# Patient Record
Sex: FEMALE | Race: WHITE | NOT HISPANIC OR LATINO | Employment: UNEMPLOYED | URBAN - NONMETROPOLITAN AREA
[De-identification: names, ages, dates, MRNs, and addresses within clinical notes are randomized per-mention and may not be internally consistent; named-entity substitution may affect disease eponyms.]

---

## 2017-05-30 DIAGNOSIS — D50.9 IRON DEFICIENCY ANEMIA, UNSPECIFIED IRON DEFICIENCY ANEMIA TYPE: Primary | ICD-10-CM

## 2017-05-31 ENCOUNTER — OFFICE VISIT (OUTPATIENT)
Dept: ONCOLOGY | Facility: CLINIC | Age: 52
End: 2017-05-31

## 2017-05-31 ENCOUNTER — LAB (OUTPATIENT)
Dept: ONCOLOGY | Facility: CLINIC | Age: 52
End: 2017-05-31

## 2017-05-31 VITALS
TEMPERATURE: 98.4 F | HEIGHT: 67 IN | DIASTOLIC BLOOD PRESSURE: 78 MMHG | RESPIRATION RATE: 16 BRPM | OXYGEN SATURATION: 97 % | WEIGHT: 248.1 LBS | HEART RATE: 80 BPM | BODY MASS INDEX: 38.94 KG/M2 | SYSTOLIC BLOOD PRESSURE: 128 MMHG

## 2017-05-31 DIAGNOSIS — N63.10 LUMP OF RIGHT BREAST: Primary | ICD-10-CM

## 2017-05-31 DIAGNOSIS — D50.9 IRON DEFICIENCY ANEMIA, UNSPECIFIED: Primary | ICD-10-CM

## 2017-05-31 DIAGNOSIS — D50.9 IRON DEFICIENCY ANEMIA, UNSPECIFIED IRON DEFICIENCY ANEMIA TYPE: ICD-10-CM

## 2017-05-31 PROBLEM — C50.412 MALIGNANT NEOPLASM OF UPPER-OUTER QUADRANT OF LEFT FEMALE BREAST: Status: ACTIVE | Noted: 2017-05-31

## 2017-05-31 LAB
ALBUMIN SERPL-MCNC: 4.4 G/DL (ref 3.5–5)
ALBUMIN/GLOB SERPL: 1.2 G/DL
ALP SERPL-CCNC: 131 U/L (ref 38–126)
ALT SERPL W P-5'-P-CCNC: 37 U/L (ref 9–52)
ANION GAP SERPL CALCULATED.3IONS-SCNC: 11 MMOL/L
AST SERPL-CCNC: 31 U/L (ref 5–40)
AUTO MIXED CELLS #: 0.6 10*3/UL (ref 0.1–1.5)
AUTO MIXED CELLS %: 6.5 % (ref 0.2–15.1)
BILIRUB SERPL-MCNC: 0.5 MG/DL (ref 0.2–1.3)
BUN BLD-MCNC: 11 MG/DL (ref 7–26)
BUN/CREAT SERPL: 13.8 (ref 7–25)
CALCIUM SPEC-SCNC: 9.4 MG/DL (ref 8.4–10.2)
CHLORIDE SERPL-SCNC: 107 MMOL/L (ref 98–107)
CO2 SERPL-SCNC: 27 MMOL/L (ref 22–30)
CREAT BLD-MCNC: 0.8 MG/DL (ref 0.7–1.4)
ERYTHROCYTE [DISTWIDTH] IN BLOOD BY AUTOMATED COUNT: 14.8 % (ref 11.5–14.5)
GFR SERPL CREATININE-BSD FRML MDRD: 76 ML/MIN/1.73
GLOBULIN UR ELPH-MCNC: 3.7 GM/DL
GLUCOSE BLD-MCNC: 133 MG/DL (ref 75–110)
HCT VFR BLD AUTO: 45.5 % (ref 37–47)
HGB BLD-MCNC: 14.4 G/DL (ref 12–16)
LYMPHOCYTES # BLD AUTO: 2.3 10*3/MM3 (ref 0.8–7)
LYMPHOCYTES NFR BLD AUTO: 25.6 % (ref 10–58.5)
MCH RBC QN AUTO: 28.6 PG (ref 27–31)
MCHC RBC AUTO-ENTMCNC: 31.6 G/DL (ref 33–37)
MCV RBC AUTO: 90.5 FL (ref 81–99)
NEUTROPHILS # BLD AUTO: 6 10*3/MM3 (ref 2–7.8)
NEUTROPHILS NFR BLD AUTO: 67.9 % (ref 37–92)
PLATELET # BLD AUTO: 370 10*3/MM3 (ref 130–400)
PMV BLD AUTO: 9 FL (ref 6–12)
POTASSIUM BLD-SCNC: 3.8 MMOL/L (ref 3.6–5)
PROT SERPL-MCNC: 8.1 G/DL (ref 6.3–8.2)
RBC # BLD AUTO: 5.03 10*6/MM3 (ref 4.2–5.4)
SODIUM BLD-SCNC: 145 MMOL/L (ref 137–145)
WBC NRBC COR # BLD: 8.8 10*3/MM3 (ref 4.8–10.8)

## 2017-05-31 PROCEDURE — 80053 COMPREHEN METABOLIC PANEL: CPT | Performed by: INTERNAL MEDICINE

## 2017-05-31 PROCEDURE — 85025 COMPLETE CBC W/AUTO DIFF WBC: CPT | Performed by: INTERNAL MEDICINE

## 2017-05-31 PROCEDURE — 36415 COLL VENOUS BLD VENIPUNCTURE: CPT | Performed by: INTERNAL MEDICINE

## 2017-05-31 PROCEDURE — 99213 OFFICE O/P EST LOW 20 MIN: CPT | Performed by: INTERNAL MEDICINE

## 2017-05-31 RX ORDER — CLOBETASOL PROPIONATE 0.5 MG/G
CREAM TOPICAL
COMMUNITY
Start: 2017-04-03 | End: 2019-07-24

## 2017-05-31 RX ORDER — ANASTROZOLE 1 MG/1
1 TABLET ORAL DAILY
Qty: 30 TABLET | Refills: 5 | Status: SHIPPED | OUTPATIENT
Start: 2017-05-31 | End: 2017-12-04 | Stop reason: SDUPTHER

## 2017-05-31 RX ORDER — PREDNISONE 10 MG/1
TABLET ORAL
COMMUNITY
Start: 2017-04-03 | End: 2019-07-24

## 2017-06-01 ENCOUNTER — HOSPITAL ENCOUNTER (OUTPATIENT)
Dept: GENERAL RADIOLOGY | Age: 52
Discharge: HOME | End: 2017-06-01
Payer: COMMERCIAL

## 2017-06-01 DIAGNOSIS — N63.0 BREAST NODULE: ICD-10-CM

## 2017-06-01 DIAGNOSIS — N63.10 LUMP OF RIGHT BREAST: ICD-10-CM

## 2017-06-01 PROCEDURE — 71020 XR CHEST STANDARD TWO VW: CPT

## 2017-06-20 ENCOUNTER — HOSPITAL ENCOUNTER (OUTPATIENT)
Dept: WOMENS IMAGING | Age: 52
Discharge: HOME | End: 2017-06-20
Payer: COMMERCIAL

## 2017-06-20 ENCOUNTER — OFFICE VISIT (OUTPATIENT)
Dept: SURGERY | Age: 52
End: 2017-06-20
Payer: COMMERCIAL

## 2017-06-20 VITALS
SYSTOLIC BLOOD PRESSURE: 130 MMHG | DIASTOLIC BLOOD PRESSURE: 80 MMHG | BODY MASS INDEX: 38.61 KG/M2 | HEIGHT: 67 IN | WEIGHT: 246 LBS | HEART RATE: 88 BPM

## 2017-06-20 DIAGNOSIS — Z90.13 STATUS POST MASTECTOMY, BILATERAL: Primary | ICD-10-CM

## 2017-06-20 DIAGNOSIS — Z85.3 HX OF BREAST CANCER: ICD-10-CM

## 2017-06-20 DIAGNOSIS — R22.2 CHEST SWELLING: ICD-10-CM

## 2017-06-20 DIAGNOSIS — N63.0 BREAST LUMP: ICD-10-CM

## 2017-06-20 PROCEDURE — 76642 ULTRASOUND BREAST LIMITED: CPT

## 2017-06-20 PROCEDURE — 99213 OFFICE O/P EST LOW 20 MIN: CPT | Performed by: SURGERY

## 2017-10-05 DIAGNOSIS — E55.9 VITAMIN D DEFICIENCY: ICD-10-CM

## 2017-10-05 DIAGNOSIS — Z00.00 HEALTHCARE MAINTENANCE: Primary | ICD-10-CM

## 2017-10-17 ENCOUNTER — TELEPHONE (OUTPATIENT)
Dept: SURGERY | Age: 52
End: 2017-10-17

## 2017-12-04 RX ORDER — ANASTROZOLE 1 MG/1
1 TABLET ORAL DAILY
Qty: 90 TABLET | Refills: 3 | Status: SHIPPED | OUTPATIENT
Start: 2017-12-04 | End: 2018-03-28

## 2018-03-27 DIAGNOSIS — C50.412 MALIGNANT NEOPLASM OF UPPER-OUTER QUADRANT OF LEFT FEMALE BREAST, UNSPECIFIED ESTROGEN RECEPTOR STATUS (HCC): Primary | ICD-10-CM

## 2018-03-28 ENCOUNTER — APPOINTMENT (OUTPATIENT)
Dept: LAB | Facility: HOSPITAL | Age: 53
End: 2018-03-28

## 2018-03-28 ENCOUNTER — OFFICE VISIT (OUTPATIENT)
Dept: ONCOLOGY | Facility: CLINIC | Age: 53
End: 2018-03-28

## 2018-03-28 VITALS
HEIGHT: 67 IN | WEIGHT: 255.5 LBS | SYSTOLIC BLOOD PRESSURE: 130 MMHG | TEMPERATURE: 98.2 F | OXYGEN SATURATION: 95 % | DIASTOLIC BLOOD PRESSURE: 74 MMHG | RESPIRATION RATE: 18 BRPM | BODY MASS INDEX: 40.1 KG/M2 | HEART RATE: 84 BPM

## 2018-03-28 DIAGNOSIS — D05.12 DUCTAL CARCINOMA IN SITU OF LEFT BREAST: Primary | ICD-10-CM

## 2018-03-28 DIAGNOSIS — C50.412 MALIGNANT NEOPLASM OF UPPER-OUTER QUADRANT OF LEFT FEMALE BREAST, UNSPECIFIED ESTROGEN RECEPTOR STATUS (HCC): Primary | ICD-10-CM

## 2018-03-28 LAB
ALBUMIN SERPL-MCNC: 4.4 G/DL (ref 3.5–5)
ALBUMIN/GLOB SERPL: 1.3 G/DL (ref 1.1–2.5)
ALP SERPL-CCNC: 168 U/L (ref 24–120)
ALT SERPL W P-5'-P-CCNC: 57 U/L (ref 0–54)
ANION GAP SERPL CALCULATED.3IONS-SCNC: 13 MMOL/L (ref 4–13)
AST SERPL-CCNC: 50 U/L (ref 7–45)
BASOPHILS # BLD AUTO: 0.05 10*3/MM3 (ref 0–0.2)
BASOPHILS NFR BLD AUTO: 0.6 % (ref 0–2)
BILIRUB SERPL-MCNC: 0.5 MG/DL (ref 0.1–1)
BUN BLD-MCNC: 12 MG/DL (ref 5–21)
BUN/CREAT SERPL: 15 (ref 7–25)
CALCIUM SPEC-SCNC: 10.4 MG/DL (ref 8.4–10.4)
CHLORIDE SERPL-SCNC: 102 MMOL/L (ref 98–110)
CO2 SERPL-SCNC: 27 MMOL/L (ref 24–31)
CREAT BLD-MCNC: 0.8 MG/DL (ref 0.5–1.4)
DEPRECATED RDW RBC AUTO: 41.3 FL (ref 40–54)
EOSINOPHIL # BLD AUTO: 0.15 10*3/MM3 (ref 0–0.7)
EOSINOPHIL NFR BLD AUTO: 1.7 % (ref 0–4)
ERYTHROCYTE [DISTWIDTH] IN BLOOD BY AUTOMATED COUNT: 13.6 % (ref 12–15)
GFR SERPL CREATININE-BSD FRML MDRD: 75 ML/MIN/1.73
GLOBULIN UR ELPH-MCNC: 3.5 GM/DL
GLUCOSE BLD-MCNC: 90 MG/DL (ref 70–100)
HCT VFR BLD AUTO: 44.5 % (ref 37–47)
HGB BLD-MCNC: 14.8 G/DL (ref 12–16)
HOLD SPECIMEN: NORMAL
HOLD SPECIMEN: NORMAL
IMM GRANULOCYTES # BLD: 0.02 10*3/MM3 (ref 0–0.03)
IMM GRANULOCYTES NFR BLD: 0.2 % (ref 0–5)
LYMPHOCYTES # BLD AUTO: 2.27 10*3/MM3 (ref 0.72–4.86)
LYMPHOCYTES NFR BLD AUTO: 25 % (ref 15–45)
MCH RBC QN AUTO: 27.7 PG (ref 28–32)
MCHC RBC AUTO-ENTMCNC: 33.3 G/DL (ref 33–36)
MCV RBC AUTO: 83.3 FL (ref 82–98)
MONOCYTES # BLD AUTO: 0.56 10*3/MM3 (ref 0.19–1.3)
MONOCYTES NFR BLD AUTO: 6.2 % (ref 4–12)
NEUTROPHILS # BLD AUTO: 6.02 10*3/MM3 (ref 1.87–8.4)
NEUTROPHILS NFR BLD AUTO: 66.3 % (ref 39–78)
NRBC BLD MANUAL-RTO: 0 /100 WBC (ref 0–0)
PLATELET # BLD AUTO: 359 10*3/MM3 (ref 130–400)
PMV BLD AUTO: 9.6 FL (ref 6–12)
POTASSIUM BLD-SCNC: 3.8 MMOL/L (ref 3.5–5.3)
PROT SERPL-MCNC: 7.9 G/DL (ref 6.3–8.7)
RBC # BLD AUTO: 5.34 10*6/MM3 (ref 4.2–5.4)
SODIUM BLD-SCNC: 142 MMOL/L (ref 135–145)
WBC NRBC COR # BLD: 9.07 10*3/MM3 (ref 4.8–10.8)

## 2018-03-28 PROCEDURE — 85025 COMPLETE CBC W/AUTO DIFF WBC: CPT | Performed by: INTERNAL MEDICINE

## 2018-03-28 PROCEDURE — 80053 COMPREHEN METABOLIC PANEL: CPT | Performed by: INTERNAL MEDICINE

## 2018-03-28 PROCEDURE — 99213 OFFICE O/P EST LOW 20 MIN: CPT | Performed by: INTERNAL MEDICINE

## 2018-03-28 PROCEDURE — 36415 COLL VENOUS BLD VENIPUNCTURE: CPT

## 2018-03-28 NOTE — PROGRESS NOTES
PROBLEM LIST:  DCIS of the left breast, status post bilateral mastectomies.  She had a left mastectomy at diagnosis in  and a prophylactic right mastectomy in .  She was started on adjuvant Arimidex after her original left mastectomy and has continued on it.  49 year old female who had onset of menses at age 15. Her last normal menstrual period was at age 39. She used oral contraceptives for  approximately 20 years. She used hormonal manipulation for approximately 1 year. Her first pregnancy was at age 26 and she had 2  children . Patient had a maternal and pancreatic cancer. A maternal grandmother had uterine cancer and colon cancer. A first cousin   of leukemia. There's no history of prostate cancer, breast cancer or ovarian cancer.  Patient had a routine mammogram on 10/15/2014 finding calcifications in the upperouter  quadrant left breast. A unilateral mammogram of  the left breast reveals suspicious microcalcifications category 4. The patient was referred to Dr. Mariana Chaney and a stereotactic biopsy was  performed of the left breast on 11/3/2014. Biopsy was consistent with ductal carcinoma in situ, high grade with central necrosis and  calcification. The area measures 1.2 cm in greatest linear dimension. Breast December profile estrogen receptor: 99% and progesterone  receptor: 94%? HER2/  davi 1+? FISH: Equivocal.  Patient return to the operating room on 2014 and a left breast simple mastectomy was performed showing ductal carcinoma in situ,  solid, comedo and cribriform patterns nuclear grade 23.  No invasive carcinoma. Margins of mastectomy are free of involvement of tumor.          HISTORY OF PRESENT ILLNESS:   Chief complaint: Here about of DCIS of the breast  Ms. Tony continues on Arimidex.  She's had some diffuse aching and some hot flashes.  These occur regularly and do wake her at night sometimes.  She's tried vitamin D you to see if it would help with the aching and  "didn't have any changes in her side effects.  She doesn't have any long bone pain or other symptoms typical of metastatic breast cancer.  She notes and irregularity along her chest wall that was investigated previously including ultrasound but hasn't noticed any increase or change.    Past Medical History, Past Surgical History, Social History, Family History have been reviewed and are without significant changes except as mentioned.    Review of Systems   A comprehensive 14 point review of systems was performed and was negative except as mentioned.    Medications:  The current medication list was reviewed in the EMR    ALLERGIES:  Allergies not on file           /74   Pulse 84   Temp 98.2 °F (36.8 °C) (Tympanic)   Resp 18   Ht 170.2 cm (67\")   Wt 116 kg (255 lb 8 oz)   SpO2 95%   BMI 40.02 kg/m²      Performance Status: ECOG 0    General: well appearing, in no acute distress  HEENT: sclera anicteric, oropharynx clear  Lymphatics: no cervical, supraclavicular, or axillary adenopathy  Cardiovascular: regular rate and rhythm, no murmurs  Lungs: clear to auscultation bilaterally  Abdomen: soft, nontender, nondistended.  No palpable organomegaly  Breasts: The chest wall has no nodules or other evidence of recurrence.  There is no abnormal erythema or any exudate to suggest infection.  Extremeties: no lower extremity edema  Skin: no rashes, lesions, bruising, or petechiae    RECENT LABS:   WBC   Date Value Ref Range Status   03/28/2018 9.07 4.80 - 10.80 10*3/mm3 Final     Hemoglobin   Date Value Ref Range Status   03/28/2018 14.8 12.0 - 16.0 g/dL Final     Hematocrit   Date Value Ref Range Status   03/28/2018 44.5 37.0 - 47.0 % Final     MCV   Date Value Ref Range Status   03/28/2018 83.3 82.0 - 98.0 fL Final     RDW   Date Value Ref Range Status   03/28/2018 13.6 12.0 - 15.0 % Final     MPV   Date Value Ref Range Status   03/28/2018 9.6 6.0 - 12.0 fL Final     Platelets   Date Value Ref Range Status "   03/28/2018 359 130 - 400 10*3/mm3 Final     Immature Grans %   Date Value Ref Range Status   03/28/2018 0.2 0.0 - 5.0 % Final     Neutrophils, Absolute   Date Value Ref Range Status   03/28/2018 6.02 1.87 - 8.40 10*3/mm3 Final     Lymphocytes, Absolute   Date Value Ref Range Status   03/28/2018 2.27 0.72 - 4.86 10*3/mm3 Final     Monocytes, Absolute   Date Value Ref Range Status   03/28/2018 0.56 0.19 - 1.30 10*3/mm3 Final     Eosinophils, Absolute   Date Value Ref Range Status   03/28/2018 0.15 0.00 - 0.70 10*3/mm3 Final     Basophils, Absolute   Date Value Ref Range Status   03/28/2018 0.05 0.00 - 0.20 10*3/mm3 Final     Immature Grans, Absolute   Date Value Ref Range Status   03/28/2018 0.02 0.00 - 0.03 10*3/mm3 Final     nRBC   Date Value Ref Range Status   03/28/2018 0.0 0.0 - 0.0 /100 WBC Final       Glucose   Date Value Ref Range Status   05/31/2017 133 (H) 75 - 110 mg/dL Final     Sodium   Date Value Ref Range Status   05/31/2017 145 137 - 145 mmol/L Final     Potassium   Date Value Ref Range Status   05/31/2017 3.8 3.6 - 5.0 mmol/L Final     CO2   Date Value Ref Range Status   05/31/2017 27.0 22.0 - 30.0 mmol/L Final     Chloride   Date Value Ref Range Status   05/31/2017 107 98 - 107 mmol/L Final     Anion Gap   Date Value Ref Range Status   05/31/2017 11.0 mmol/L Final     Creatinine   Date Value Ref Range Status   05/31/2017 0.80 0.70 - 1.40 mg/dL Final     BUN   Date Value Ref Range Status   05/31/2017 11 7 - 26 mg/dL Final     BUN/Creatinine Ratio   Date Value Ref Range Status   05/31/2017 13.8 7.0 - 25.0 Final     Calcium   Date Value Ref Range Status   05/31/2017 9.4 8.4 - 10.2 mg/dL Final     eGFR Non  Amer   Date Value Ref Range Status   05/31/2017 76 >60 mL/min/1.73 Final     Alkaline Phosphatase   Date Value Ref Range Status   05/31/2017 131 (H) 38 - 126 U/L Final     Total Protein   Date Value Ref Range Status   05/31/2017 8.1 6.3 - 8.2 g/dL Final     ALT (SGPT)   Date Value Ref Range  Status   05/31/2017 37 9 - 52 U/L Final     AST (SGOT)   Date Value Ref Range Status   05/31/2017 31 5 - 40 U/L Final     Total Bilirubin   Date Value Ref Range Status   05/31/2017 0.5 0.2 - 1.3 mg/dL Final     Albumin   Date Value Ref Range Status   05/31/2017 4.40 3.50 - 5.00 g/dL Final     Globulin   Date Value Ref Range Status   05/31/2017 3.7 gm/dL Final     A/G Ratio   Date Value Ref Range Status   05/31/2017 1.2 g/dL Final       No results found for: LDH, URICACID    No results found for: MSPIKE, KAPPALAMB, IGLFLC, FREEKAPPAL              Impression: 1.  Ductal carcinoma in situ of the left breast.  She's now had bilateral mastectomies.  She's been on adjuvant tamoxifen since her original diagnosis.    Plan: 1.  We had a detailed discussion about potential benefits and risks of adjuvant therapy in her situation.  I am first we discussed the lack of any survival benefit for adjuvant endocrine therapy of DCIS.  We then discussed the original benefit for decrease in risk of cancer in the contralateral breast, before she underwent prophylactic right mastectomy.  Given the minimal benefit, if any from additional adjuvant endocrine therapy and the side effects involved, she's comfortable with my recommendation to stop the Arimidex.  I've asked her to continue regular examinations of her incisions and return in 6 months.       Keenan Coombs MD  [facility]    03/28/18           CC:

## 2018-10-02 ENCOUNTER — APPOINTMENT (OUTPATIENT)
Dept: LAB | Facility: HOSPITAL | Age: 53
End: 2018-10-02

## 2019-07-03 ENCOUNTER — TELEPHONE (OUTPATIENT)
Dept: INTERNAL MEDICINE | Age: 54
End: 2019-07-03

## 2019-07-24 ENCOUNTER — OFFICE VISIT (OUTPATIENT)
Dept: GASTROENTEROLOGY | Facility: CLINIC | Age: 54
End: 2019-07-24

## 2019-07-24 VITALS
SYSTOLIC BLOOD PRESSURE: 124 MMHG | WEIGHT: 225 LBS | HEIGHT: 66 IN | OXYGEN SATURATION: 100 % | DIASTOLIC BLOOD PRESSURE: 76 MMHG | HEART RATE: 76 BPM | BODY MASS INDEX: 36.16 KG/M2 | TEMPERATURE: 97.7 F

## 2019-07-24 DIAGNOSIS — Z80.0 FAMILY HISTORY OF COLON CANCER: ICD-10-CM

## 2019-07-24 DIAGNOSIS — Z86.010 HX OF COLONIC POLYPS: Primary | ICD-10-CM

## 2019-07-24 DIAGNOSIS — Z83.71 FAMILY HISTORY OF POLYPS IN THE COLON: ICD-10-CM

## 2019-07-24 PROBLEM — Z86.0100 HX OF COLONIC POLYPS: Status: ACTIVE | Noted: 2019-07-24

## 2019-07-24 PROBLEM — Z83.719 FAMILY HISTORY OF POLYPS IN THE COLON: Status: ACTIVE | Noted: 2019-07-24

## 2019-07-24 PROCEDURE — S0260 H&P FOR SURGERY: HCPCS | Performed by: NURSE PRACTITIONER

## 2019-07-24 RX ORDER — PHENOL 1.4 %
600 AEROSOL, SPRAY (ML) MUCOUS MEMBRANE DAILY
COMMUNITY
End: 2022-09-28 | Stop reason: ALTCHOICE

## 2019-07-24 RX ORDER — SODIUM, POTASSIUM,MAG SULFATES 17.5-3.13G
1 SOLUTION, RECONSTITUTED, ORAL ORAL EVERY 12 HOURS
Qty: 2 BOTTLE | Refills: 0 | COMMUNITY
Start: 2019-07-24 | End: 2019-08-02 | Stop reason: HOSPADM

## 2019-07-24 RX ORDER — MELATONIN
1000 DAILY
COMMUNITY
End: 2020-09-29 | Stop reason: DRUGHIGH

## 2019-07-24 NOTE — PROGRESS NOTES
Chief Complaint   Patient presents with   • Colon Cancer Screening     Pt presents today for colon recall-last colon was 2015; Personal history of colon polyps; Family history of colon cancer and colon polyps     Subjective   HPI  Madhav Tony is a 53 y.o. female who presents as a referral for preventative maintenance. She has no complaints of nausea or vomiting. No change in bowels. No wt loss. No BRBPR. No melena. There is a family hx for colon cancer maternal grandmother >60. No abdominal pain. There was no Cologuard screening this year.  The patient's last colonoscopy was performed on 2015 with findings of adenomatous polyps.      Past Medical History:   Diagnosis Date   • Breast cancer (CMS/HCC)    • Family history of colon cancer    • Family history of colonic polyps    • Heart burn    • History of colon polyps    • History of uterine leiomyoma      Past Surgical History:   Procedure Laterality Date   • BREAST BIOPSY Right    •  SECTION     • COLONOSCOPY  2015    4mm tubular adenomatous polyp in transverse colon; Repeat 3 years   • HYSTERECTOMY     • MASTECTOMY Left        Current Outpatient Medications:   •  calcium carbonate (OS-JAVED) 600 MG tablet, Take 600 mg by mouth Daily., Disp: , Rfl:   •  cholecalciferol (VITAMIN D3) 1000 units tablet, Take 1,000 Units by mouth Daily., Disp: , Rfl:   •  sodium-potassium-magnesium sulfates (SUPREP BOWEL PREP KIT) 17.5-3.13-1.6 GM/177ML solution oral solution, Take 1 bottle by mouth Every 12 (Twelve) Hours. Split dose prep as directed by office instructions provided.  2 bottles = one kit., Disp: 2 bottle, Rfl: 0  No Known Allergies  Social History     Socioeconomic History   • Marital status:      Spouse name: Not on file   • Number of children: Not on file   • Years of education: Not on file   • Highest education level: Not on file   Tobacco Use   • Smoking status: Never Smoker   • Smokeless tobacco: Never Used   Substance and Sexual  "Activity   • Alcohol use: No     Family History   Problem Relation Age of Onset   • Colon polyps Mother    • Uterine cancer Maternal Grandfather    • Colon cancer Maternal Grandmother         In her 60's or 70's       REVIEW OF SYSTEMS  General: well appearing, no fever chills or sweats, no unexplained wt loss  HEENT: no acute visual or hearing disturbances  Cardiovascular: No chest pain or palpitations  Pulmonary: No shortness of breath, coughing, wheezing or hemoptysis  : No burning, urgency, hematuria, or dysuria  Musculoskeletal: No joint pain or stiffness  Peripheral: no edema  Skin: No lesions or rashes  Neuro: No dizziness, headaches, stroke, syncope  Endocrine: No hot or cold intolerances  Hematological: No blood dyscrasias    Objective   Vitals:    07/24/19 0808   BP: 124/76   BP Location: Left arm   Patient Position: Sitting   Cuff Size: Adult   Pulse: 76   Temp: 97.7 °F (36.5 °C)   TempSrc: Tympanic   SpO2: 100%   Weight: 102 kg (225 lb)   Height: 167.6 cm (66\")     Body mass index is 36.32 kg/m².    PHYSICAL EXAM  General: age appropriate well nourished well appearing, no acute distress  Head: normocephalic and atraumatic  Global assessment-supple  Neck-No JVD noted, no lymphadenopathy  Pulmonary-clear to auscultation bilaterally, normal respiratory effort  Cardiovascular-normal rate and rhythm, normal heart sounds, S1 and S2 noted  Abdomen-soft, non tender, non distended, normal bowel sounds all 4 quadrants, no hepatosplenomegaly noted  Extremities-No clubbing cyanosis or edema  Neuro-Non focal, converses appropriately, awake, alert, oriented    Imaging Results (most recent)     None        Assessment/Plan   Madhav was seen today for colon cancer screening.    Diagnoses and all orders for this visit:    Hx of colonic polyps  -     Case Request; Standing  -     Case Request    Family history of colon cancer  Comments:  maternal grandmother >60  Orders:  -     Case Request; Standing  -     Case " Request    Family history of polyps in the colon  Comments:  mother  Orders:  -     Case Request; Standing  -     Case Request    Other orders  -     Follow Anesthesia Guidelines / Standing Orders; Future  -     Obtain Informed Consent; Future  -     Implement Anesthesia Orders Day of Procedure; Standing  -     Obtain Informed Consent; Standing  -     Verify bowel prep was successful; Standing  -     sodium-potassium-magnesium sulfates (SUPREP BOWEL PREP KIT) 17.5-3.13-1.6 GM/177ML solution oral solution; Take 1 bottle by mouth Every 12 (Twelve) Hours. Split dose prep as directed by office instructions provided.  2 bottles = one kit.      COLONOSCOPY WITH ANESTHESIA (N/A)       Body mass index is 36.32 kg/m². Patient's Body mass index is 36.32 kg/m². BMI is above normal parameters. Recommendations include: nutrition counseling.      All risks, benefits, alternatives, and indications of colonoscopy procedure have been discussed with the patient. Risks to include perforation of the colon requiring possible surgery or colostomy, risk of bleeding from biopsies or removal of colon tissue, possibility of missing a colon polyp or cancer, or adverse drug reaction.  Benefits to include the diagnosis and management of disease of the colon and rectum. Alternatives to include barium enema, radiographic evaluation, lab testing or no intervention. Pt verbalizes understanding and agrees.

## 2019-08-02 ENCOUNTER — ANESTHESIA (OUTPATIENT)
Dept: GASTROENTEROLOGY | Facility: HOSPITAL | Age: 54
End: 2019-08-02

## 2019-08-02 ENCOUNTER — HOSPITAL ENCOUNTER (OUTPATIENT)
Facility: HOSPITAL | Age: 54
Setting detail: HOSPITAL OUTPATIENT SURGERY
Discharge: HOME | End: 2019-08-02
Attending: INTERNAL MEDICINE | Admitting: INTERNAL MEDICINE

## 2019-08-02 ENCOUNTER — ANESTHESIA EVENT (OUTPATIENT)
Dept: GASTROENTEROLOGY | Facility: HOSPITAL | Age: 54
End: 2019-08-02

## 2019-08-02 VITALS
RESPIRATION RATE: 14 BRPM | HEIGHT: 66 IN | WEIGHT: 224 LBS | HEART RATE: 54 BPM | SYSTOLIC BLOOD PRESSURE: 107 MMHG | TEMPERATURE: 97.6 F | OXYGEN SATURATION: 100 % | BODY MASS INDEX: 36 KG/M2 | DIASTOLIC BLOOD PRESSURE: 58 MMHG

## 2019-08-02 DIAGNOSIS — Z86.010 HX OF COLONIC POLYPS: ICD-10-CM

## 2019-08-02 DIAGNOSIS — Z80.0 FAMILY HISTORY OF COLON CANCER: ICD-10-CM

## 2019-08-02 DIAGNOSIS — Z83.71 FAMILY HISTORY OF POLYPS IN THE COLON: ICD-10-CM

## 2019-08-02 PROCEDURE — 45385 COLONOSCOPY W/LESION REMOVAL: CPT | Performed by: INTERNAL MEDICINE

## 2019-08-02 PROCEDURE — 25010000002 PROPOFOL 10 MG/ML EMULSION: Performed by: NURSE ANESTHETIST, CERTIFIED REGISTERED

## 2019-08-02 PROCEDURE — 88305 TISSUE EXAM BY PATHOLOGIST: CPT | Performed by: INTERNAL MEDICINE

## 2019-08-02 RX ORDER — SODIUM CHLORIDE 0.9 % (FLUSH) 0.9 %
3 SYRINGE (ML) INJECTION EVERY 12 HOURS SCHEDULED
Status: CANCELLED | OUTPATIENT
Start: 2019-08-02

## 2019-08-02 RX ORDER — PROPOFOL 10 MG/ML
VIAL (ML) INTRAVENOUS AS NEEDED
Status: DISCONTINUED | OUTPATIENT
Start: 2019-08-02 | End: 2019-08-02 | Stop reason: SURG

## 2019-08-02 RX ORDER — SODIUM CHLORIDE 9 MG/ML
100 INJECTION, SOLUTION INTRAVENOUS CONTINUOUS
Status: CANCELLED | OUTPATIENT
Start: 2019-08-02

## 2019-08-02 RX ORDER — SODIUM CHLORIDE 0.9 % (FLUSH) 0.9 %
3 SYRINGE (ML) INJECTION AS NEEDED
Status: DISCONTINUED | OUTPATIENT
Start: 2019-08-02 | End: 2019-08-02 | Stop reason: HOSPADM

## 2019-08-02 RX ORDER — SODIUM CHLORIDE 0.9 % (FLUSH) 0.9 %
3-10 SYRINGE (ML) INJECTION AS NEEDED
Status: CANCELLED | OUTPATIENT
Start: 2019-08-02

## 2019-08-02 RX ORDER — SODIUM CHLORIDE 9 MG/ML
500 INJECTION, SOLUTION INTRAVENOUS CONTINUOUS PRN
Status: DISCONTINUED | OUTPATIENT
Start: 2019-08-02 | End: 2019-08-02 | Stop reason: HOSPADM

## 2019-08-02 RX ADMIN — PROPOFOL 230 MG: 10 INJECTION, EMULSION INTRAVENOUS at 09:17

## 2019-08-02 RX ADMIN — SODIUM CHLORIDE 500 ML: 9 INJECTION, SOLUTION INTRAVENOUS at 07:51

## 2019-08-02 NOTE — INTERVAL H&P NOTE
H&P updated. The patient was examined and the following changes are noted:        She had a tubulovillous adenoma removed in November 2015.  Here for surveillance.

## 2019-08-02 NOTE — ANESTHESIA PREPROCEDURE EVALUATION
Anesthesia Evaluation     no history of anesthetic complications:  NPO Solid Status: > 8 hours  NPO Liquid Status: > 4 hours           Airway   Mallampati: II  TM distance: >3 FB  Neck ROM: full  Dental - normal exam     Pulmonary - normal exam    breath sounds clear to auscultation  (-) asthma, recent URI, sleep apnea, not a smoker  Cardiovascular - normal exam  Exercise tolerance: good (4-7 METS)    Rhythm: regular  Rate: normal    (-) pacemaker, hypertension, past MI, angina, cardiac stents, CABG      Neuro/Psych  (-) seizures, TIA, CVA  GI/Hepatic/Renal/Endo    (+) obesity,     (-) GERD, liver disease, no renal disease, diabetes, hypothyroidism, hyperthyroidism    Musculoskeletal     Abdominal    Substance History      OB/GYN          Other                        Anesthesia Plan    ASA 2     MAC     intravenous induction   Anesthetic plan, all risks, benefits, and alternatives have been provided, discussed and informed consent has been obtained with: patient.

## 2019-08-09 LAB
CYTO UR: NORMAL
LAB AP CASE REPORT: NORMAL
PATH REPORT.FINAL DX SPEC: NORMAL
PATH REPORT.GROSS SPEC: NORMAL

## 2019-08-29 ENCOUNTER — HOSPITAL ENCOUNTER (OUTPATIENT)
Dept: ULTRASOUND IMAGING | Age: 54
Discharge: HOME | End: 2019-08-29
Payer: MEDICAID

## 2019-08-29 ENCOUNTER — TELEPHONE (OUTPATIENT)
Dept: INTERNAL MEDICINE | Age: 54
End: 2019-08-29

## 2019-08-29 ENCOUNTER — OFFICE VISIT (OUTPATIENT)
Dept: INTERNAL MEDICINE | Age: 54
End: 2019-08-29
Payer: MEDICAID

## 2019-08-29 VITALS
HEIGHT: 67 IN | BODY MASS INDEX: 34.06 KG/M2 | OXYGEN SATURATION: 98 % | DIASTOLIC BLOOD PRESSURE: 90 MMHG | HEART RATE: 69 BPM | SYSTOLIC BLOOD PRESSURE: 160 MMHG | TEMPERATURE: 97.3 F | WEIGHT: 217 LBS

## 2019-08-29 DIAGNOSIS — R10.11 RUQ PAIN: Primary | ICD-10-CM

## 2019-08-29 DIAGNOSIS — K80.20 MULTIPLE CALCULI OF GALLBLADDER: ICD-10-CM

## 2019-08-29 DIAGNOSIS — R10.11 RUQ PAIN: ICD-10-CM

## 2019-08-29 LAB
ALBUMIN SERPL-MCNC: 4.4 G/DL (ref 3.5–5.2)
ALP BLD-CCNC: 246 U/L (ref 35–104)
ALT SERPL-CCNC: 330 U/L (ref 5–33)
AMYLASE: 54 U/L (ref 28–100)
ANION GAP SERPL CALCULATED.3IONS-SCNC: 15 MMOL/L (ref 7–19)
AST SERPL-CCNC: 277 U/L (ref 5–32)
BASOPHILS ABSOLUTE: 0 K/UL (ref 0–0.2)
BASOPHILS RELATIVE PERCENT: 0.4 % (ref 0–1)
BILIRUB SERPL-MCNC: 0.6 MG/DL (ref 0.2–1.2)
BUN BLDV-MCNC: 6 MG/DL (ref 6–20)
CALCIUM SERPL-MCNC: 9.6 MG/DL (ref 8.6–10)
CHLORIDE BLD-SCNC: 105 MMOL/L (ref 98–111)
CHOLESTEROL, TOTAL: 169 MG/DL (ref 160–199)
CO2: 25 MMOL/L (ref 22–29)
CREAT SERPL-MCNC: 0.8 MG/DL (ref 0.5–0.9)
EOSINOPHILS ABSOLUTE: 0.1 K/UL (ref 0–0.6)
EOSINOPHILS RELATIVE PERCENT: 1.4 % (ref 0–5)
GFR NON-AFRICAN AMERICAN: >60
GLUCOSE BLD-MCNC: 82 MG/DL (ref 74–109)
HCT VFR BLD CALC: 45 % (ref 37–47)
HDLC SERPL-MCNC: 50 MG/DL (ref 65–121)
HEMOGLOBIN: 14.4 G/DL (ref 12–16)
IMMATURE GRANULOCYTES #: 0 K/UL
LDL CHOLESTEROL CALCULATED: 102 MG/DL
LIPASE: 37 U/L (ref 13–60)
LYMPHOCYTES ABSOLUTE: 1.6 K/UL (ref 1.1–4.5)
LYMPHOCYTES RELATIVE PERCENT: 17.4 % (ref 20–40)
MCH RBC QN AUTO: 28.2 PG (ref 27–31)
MCHC RBC AUTO-ENTMCNC: 32 G/DL (ref 33–37)
MCV RBC AUTO: 88.2 FL (ref 81–99)
MONOCYTES ABSOLUTE: 0.5 K/UL (ref 0–0.9)
MONOCYTES RELATIVE PERCENT: 5.2 % (ref 0–10)
NEUTROPHILS ABSOLUTE: 6.9 K/UL (ref 1.5–7.5)
NEUTROPHILS RELATIVE PERCENT: 75.3 % (ref 50–65)
PDW BLD-RTO: 14.3 % (ref 11.5–14.5)
PLATELET # BLD: 295 K/UL (ref 130–400)
PMV BLD AUTO: 10.5 FL (ref 9.4–12.3)
POTASSIUM SERPL-SCNC: 4.1 MMOL/L (ref 3.5–5)
RBC # BLD: 5.1 M/UL (ref 4.2–5.4)
SODIUM BLD-SCNC: 145 MMOL/L (ref 136–145)
TOTAL PROTEIN: 7.4 G/DL (ref 6.6–8.7)
TRIGL SERPL-MCNC: 87 MG/DL (ref 0–149)
TSH SERPL DL<=0.05 MIU/L-ACNC: 0.83 UIU/ML (ref 0.27–4.2)
WBC # BLD: 9.2 K/UL (ref 4.8–10.8)

## 2019-08-29 PROCEDURE — 4004F PT TOBACCO SCREEN RCVD TLK: CPT | Performed by: INTERNAL MEDICINE

## 2019-08-29 PROCEDURE — G8427 DOCREV CUR MEDS BY ELIG CLIN: HCPCS | Performed by: INTERNAL MEDICINE

## 2019-08-29 PROCEDURE — 99203 OFFICE O/P NEW LOW 30 MIN: CPT | Performed by: INTERNAL MEDICINE

## 2019-08-29 PROCEDURE — 76705 ECHO EXAM OF ABDOMEN: CPT

## 2019-08-29 PROCEDURE — 3017F COLORECTAL CA SCREEN DOC REV: CPT | Performed by: INTERNAL MEDICINE

## 2019-08-29 PROCEDURE — G8417 CALC BMI ABV UP PARAM F/U: HCPCS | Performed by: INTERNAL MEDICINE

## 2019-08-29 RX ORDER — PHENOL 1.4 %
600 AEROSOL, SPRAY (ML) MUCOUS MEMBRANE DAILY
COMMUNITY

## 2019-08-29 RX ORDER — MELATONIN
1000 DAILY
COMMUNITY

## 2019-08-29 NOTE — PATIENT INSTRUCTIONS
Patient Education        Learning About Acute Cholecystitis  What is cholecystitis? Cholecystitis (say \"koh-lih-sis-TY-tus\") is inflammation of the gallbladder. The gallbladder stores bile. Bile helps the body digest food. Normally, the bile flows from the gallbladder to the small intestine. A gallstone stuck in the cystic duct is most often the cause of sudden (acute) cholecystitis. The cystic duct is the tube that carries the bile out of the gallbladder. The gallstone blocks the bile from leaving the gallbladder. This results in an irritated and swollen gallbladder. The disease can also be caused by infection or trauma, such as an injury from a car accident. Cholecystitis has to be treated right away. You will probably have to go to the hospital. Surgery is the usual treatment. What are the symptoms? Symptoms include:  · Steady and severe pain in the upper right part of belly. This is the most common symptom. The pain can sometimes move to your back or right shoulder blade. It may last for more than 6 hours. · Nausea or vomiting. · A fever. How is it treated? The main way to treat this disease is surgery to remove the gallbladder. This surgery can often be done through small cuts (incisions) in the belly. This is called a laparoscopic cholecystectomy. In some cases, you may need a more extensive surgery. You may need surgery as soon as possible. The doctor may try to reduce swelling and irritation in the gallbladder before removing it. You may be given fluids and antibiotics through an IV. You may also be given pain medicine. Follow-up care is a key part of your treatment and safety. Be sure to make and go to all appointments, and call your doctor if you are having problems. It's also a good idea to know your test results and keep a list of the medicines you take. Where can you learn more? Go to [email or web address][email or web address] org and sign in to your [employer] account.  Enter T940 in the Search

## 2019-08-30 DIAGNOSIS — K80.20 CALCULUS OF GALLBLADDER WITHOUT CHOLECYSTITIS WITHOUT OBSTRUCTION: Primary | ICD-10-CM

## 2019-08-30 RX ORDER — HYDROCODONE BITARTRATE AND ACETAMINOPHEN 5; 325 MG/1; MG/1
1 TABLET ORAL EVERY 6 HOURS PRN
Qty: 10 TABLET | Refills: 0 | Status: SHIPPED | OUTPATIENT
Start: 2019-08-30 | End: 2019-09-02

## 2019-08-31 ASSESSMENT — ENCOUNTER SYMPTOMS
TROUBLE SWALLOWING: 0
CHEST TIGHTNESS: 0
SINUS PRESSURE: 0
COLOR CHANGE: 0
SORE THROAT: 0
CHOKING: 0
SHORTNESS OF BREATH: 0
COUGH: 0
EYE PAIN: 0
WHEEZING: 0
EYE DISCHARGE: 0
BACK PAIN: 0
VOMITING: 0
NAUSEA: 0
EYE REDNESS: 0
VOICE CHANGE: 0
DIARRHEA: 0
ABDOMINAL DISTENTION: 0
CONSTIPATION: 0
BLOOD IN STOOL: 0
RHINORRHEA: 0
ANAL BLEEDING: 0
FACIAL SWELLING: 0
PHOTOPHOBIA: 0
RECTAL PAIN: 0
ABDOMINAL PAIN: 1
EYE ITCHING: 0

## 2019-08-31 NOTE — PROGRESS NOTES
Chief Complaint   Patient presents with    GI Problem     Pt has pain that starts in the center of her epigastric region and radiates around her right side to her back. She thinks this might be her gallbladder. She states that attacks get worse each time. Mostly at night while she is trying to sleep. HPI: Lana Jimenez is a 48 y.o. female is here for reestablishment of care. She has not been seen in this office in over 3 years. She thinks that she may be having some gallbladder issues. She states that she did have a colonoscopy in 2019, over the past few weeks, she is been having some right upper quadrant pain, that radiates to the back. At times, it will wake her up 4-5 times during the night. She is tried Prilosec, but has not had any relief from this. She denies any complaints of nausea vomiting or diarrhea. She states that she has not really had a high fat diet. She denies any complaints of fevers or chills.     Past Medical History:   Diagnosis Date    History of breast cancer     Left    History of uterine leiomyoma       Past Surgical History:   Procedure Laterality Date     SECTION      HYSTERECTOMY, TOTAL ABDOMINAL  2005    MASTECTOMY Left 2014      Social History     Socioeconomic History    Marital status:      Spouse name: Not on file    Number of children: Not on file    Years of education: Not on file    Highest education level: Not on file   Occupational History    Not on file   Social Needs    Financial resource strain: Not on file    Food insecurity:     Worry: Not on file     Inability: Not on file    Transportation needs:     Medical: Not on file     Non-medical: Not on file   Tobacco Use    Smoking status: Never Smoker   Substance and Sexual Activity    Alcohol use: No     Alcohol/week: 0.0 standard drinks    Drug use: No    Sexual activity: Not on file   Lifestyle    Physical activity:     Days per week: Not on file     Minutes per session: Not on file    Stress: Not on file   Relationships    Social connections:     Talks on phone: Not on file     Gets together: Not on file     Attends Caodaism service: Not on file     Active member of club or organization: Not on file     Attends meetings of clubs or organizations: Not on file     Relationship status: Not on file    Intimate partner violence:     Fear of current or ex partner: Not on file     Emotionally abused: Not on file     Physically abused: Not on file     Forced sexual activity: Not on file   Other Topics Concern    Not on file   Social History Narrative    Not on file      Family History   Problem Relation Age of Onset    Cancer Maternal Grandmother     Ovarian Cancer Maternal Grandmother     Breast Cancer Maternal Aunt         Current Outpatient Medications   Medication Sig Dispense Refill    calcium carbonate 600 MG TABS tablet Take 600 mg by mouth daily      Cholecalciferol (VITAMIN D3) 1000 units TABS Take 1,000 Units by mouth daily      HYDROcodone-acetaminophen (NORCO) 5-325 MG per tablet Take 1 tablet by mouth every 6 hours as needed for Pain for up to 3 days. 10 tablet 0     No current facility-administered medications for this visit. Patient Active Problem List   Diagnosis    Status post mastectomy        Review of Systems   Constitutional: Negative for activity change, appetite change, chills, diaphoresis, fatigue, fever and unexpected weight change. HENT: Negative for congestion, ear pain, facial swelling, hearing loss, mouth sores, nosebleeds, postnasal drip, rhinorrhea, sinus pressure, sneezing, sore throat, tinnitus, trouble swallowing and voice change. Eyes: Negative for photophobia, pain, discharge, redness, itching and visual disturbance. Respiratory: Negative for cough, choking, chest tightness, shortness of breath and wheezing. Cardiovascular: Negative for chest pain, palpitations and leg swelling.    Gastrointestinal: Positive for friction rub. No murmur heard. Pulmonary/Chest: Effort normal and breath sounds normal. No respiratory distress. She has no wheezes. She has no rales. She exhibits no tenderness. Abdominal: Soft. Bowel sounds are normal. She exhibits no distension and no mass. There is tenderness. There is no rebound and no guarding. Shee is tender in the right upper quadrant   Musculoskeletal: Normal range of motion. She exhibits no edema, tenderness or deformity. Lymphadenopathy:     She has no cervical adenopathy. Neurological: She is alert and oriented to person, place, and time. She has normal reflexes. She displays normal reflexes. No cranial nerve deficit. She exhibits normal muscle tone. Coordination normal.   Skin: Skin is warm and dry. No rash noted. She is not diaphoretic. No erythema. No pallor. Psychiatric: She has a normal mood and affect. Her behavior is normal.   Nursing note and vitals reviewed. 1. RUQ pain        ASSESSMENT/PLAN:    51-year-old woman here for establishment of care and to be evaluated for gallbladder disease. 1.  Right upper quadrant abdominal pain: Likely related to gallbladder disease. We will check a right upper quadrant ultrasound. If this is negative, will get a HIDA scan. We will check a CBC CMP, amylase and lipase. We will also check basic labs including TSH and lipid panel. She actually has a physical exam scheduled in the near future. Flakita Steiner was seen today for gi problem. Diagnoses and all orders for this visit:    RUQ pain  -     Comprehensive Metabolic Panel; Future  -     Lipase; Future  -     Amylase; Future  -     CBC Auto Differential; Future  -     TSH without Reflex; Future  -     Lipid Panel; Future  -     US Gallbladder Ruq; Future  -     NM Hepatobiliary; Future          Return as scheduled.      Orders Placed This Encounter   Procedures    US Gallbladder Ruq     Standing Status:   Future     Number of Occurrences:   1     Standing Expiration Date:

## 2019-09-03 ENCOUNTER — HOSPITAL ENCOUNTER (OUTPATIENT)
Dept: PREADMISSION TESTING | Age: 54
Setting detail: OUTPATIENT SURGERY
Discharge: HOME | End: 2019-09-07

## 2019-09-03 ENCOUNTER — HOSPITAL ENCOUNTER (OUTPATIENT)
Dept: NON INVASIVE DIAGNOSTICS | Age: 54
Discharge: HOME | End: 2019-09-03
Payer: MEDICAID

## 2019-09-03 ENCOUNTER — OFFICE VISIT (OUTPATIENT)
Dept: SURGERY | Age: 54
End: 2019-09-03
Payer: MEDICAID

## 2019-09-03 ENCOUNTER — PREP FOR PROCEDURE (OUTPATIENT)
Dept: SURGERY | Age: 54
End: 2019-09-03

## 2019-09-03 VITALS
DIASTOLIC BLOOD PRESSURE: 76 MMHG | BODY MASS INDEX: 34.84 KG/M2 | TEMPERATURE: 98 F | HEIGHT: 66 IN | WEIGHT: 216.8 LBS | OXYGEN SATURATION: 99 % | HEART RATE: 67 BPM | SYSTOLIC BLOOD PRESSURE: 120 MMHG

## 2019-09-03 VITALS — HEIGHT: 66 IN | BODY MASS INDEX: 34.84 KG/M2 | WEIGHT: 216.8 LBS

## 2019-09-03 DIAGNOSIS — K80.10 CHRONIC CHOLECYSTITIS WITH CALCULUS: Primary | ICD-10-CM

## 2019-09-03 PROCEDURE — 1036F TOBACCO NON-USER: CPT | Performed by: PHYSICIAN ASSISTANT

## 2019-09-03 PROCEDURE — 99203 OFFICE O/P NEW LOW 30 MIN: CPT | Performed by: PHYSICIAN ASSISTANT

## 2019-09-03 PROCEDURE — 93005 ELECTROCARDIOGRAM TRACING: CPT

## 2019-09-03 PROCEDURE — 3017F COLORECTAL CA SCREEN DOC REV: CPT | Performed by: PHYSICIAN ASSISTANT

## 2019-09-03 PROCEDURE — G8427 DOCREV CUR MEDS BY ELIG CLIN: HCPCS | Performed by: PHYSICIAN ASSISTANT

## 2019-09-03 PROCEDURE — G8417 CALC BMI ABV UP PARAM F/U: HCPCS | Performed by: PHYSICIAN ASSISTANT

## 2019-09-03 RX ORDER — SODIUM CHLORIDE 0.9 % (FLUSH) 0.9 %
10 SYRINGE (ML) INJECTION PRN
Status: CANCELLED | OUTPATIENT
Start: 2019-09-03

## 2019-09-03 RX ORDER — SODIUM CHLORIDE, SODIUM LACTATE, POTASSIUM CHLORIDE, CALCIUM CHLORIDE 600; 310; 30; 20 MG/100ML; MG/100ML; MG/100ML; MG/100ML
INJECTION, SOLUTION INTRAVENOUS CONTINUOUS
Status: CANCELLED | OUTPATIENT
Start: 2019-09-03

## 2019-09-03 RX ORDER — SODIUM CHLORIDE 0.9 % (FLUSH) 0.9 %
10 SYRINGE (ML) INJECTION EVERY 12 HOURS SCHEDULED
Status: CANCELLED | OUTPATIENT
Start: 2019-09-03

## 2019-09-03 RX ORDER — TRIAMCINOLONE ACETONIDE 5 MG/G
CREAM TOPICAL
Refills: 3 | COMMUNITY
Start: 2019-08-28

## 2019-09-03 ASSESSMENT — ENCOUNTER SYMPTOMS
CONSTIPATION: 0
NAUSEA: 0
DIARRHEA: 0
VOMITING: 0
BLOOD IN STOOL: 0
ABDOMINAL PAIN: 1

## 2019-09-03 NOTE — H&P
Alcohol use: No     Alcohol/week: 0.0 standard drinks         Past Medical History:   Diagnosis Date    Cancer ([facility] [address]     breast    History of breast cancer     Left    History of uterine leiomyoma      Family History   Problem Relation Age of Onset    Cancer Maternal Grandmother     Ovarian Cancer Maternal Grandmother     Breast Cancer Maternal Aunt     Other Father         MVA     Past Surgical History:   Procedure Laterality Date     SECTION      HYSTERECTOMY, TOTAL ABDOMINAL  2005    MASTECTOMY Left 2014     Social History     Tobacco Use    Smoking status: Never Smoker    Smokeless tobacco: Never Used   Substance Use Topics    Alcohol use: No     Alcohol/week: 0.0 standard drinks    Drug use: No     Review of Systems   Constitutional: Positive for appetite change. Negative for chills, fatigue and fever. Gastrointestinal: Positive for abdominal pain. Negative for blood in stool, constipation, diarrhea, nausea and vomiting. Objective:   Physical Exam   Constitutional: She is oriented to person, place, and time. She appears well-developed and well-nourished. /76 (Site: Right Upper Arm, Position: Sitting, Cuff Size: Large Adult)   Pulse 67   Temp 98 °F (36.7 °C) (Temporal)   Ht 5' 6\" (1.676 m)   Wt 216 lb 12.8 oz (98.3 kg)   SpO2 99%   BMI 34.99 kg/m²      HENT:   Head: Normocephalic. Mouth/Throat: Oropharynx is clear and moist.   Eyes: Pupils are equal, round, and reactive to light. Conjunctivae and EOM are normal.   Neck: No tracheal deviation present. No thyromegaly present. Cardiovascular: Normal rate, regular rhythm, normal heart sounds and intact distal pulses. Pulmonary/Chest: Effort normal and breath sounds normal.   Abdominal: Soft. Bowel sounds are normal. There is no hepatosplenomegaly, splenomegaly or hepatomegaly. There is tenderness in the right upper quadrant. There is no CVA tenderness, no tenderness at McBurney's point and negative Wilkerson's sign. Musculoskeletal: Normal range of motion. She exhibits no edema. Neurological: She is alert and oriented to person, place, and time. Skin: Skin is warm and dry. Psychiatric: She has a normal mood and affect. Her behavior is normal. Judgment and thought content normal.     Vitals:    09/03/19 1053   BP: 120/76   Pulse: 67   Temp: 98 °F (36.7 °C)   SpO2: 99%       Assessment:      Chronic Cholecystitis with Cholelithiasis      Plan:      Plan: The risks, benefits, and options were discussed with the pt. She is willing to accept all risks and proceed with a Lap. Cholecystectomy. The surgical risks included but not limited to bleeding, infection, perforation, risk of common duct injury, risk of needing an open surgery, etc. The anesthetic risks included heart attacks, strokes, pneumonia, phlebitis, etc.  She is willing to accept all risks and proceed with surgery. No guarantees have been given.       Electronically signed by Dereje Fowler PA-C on 9/3/19 at 5:32 PM

## 2019-09-03 NOTE — H&P (VIEW-ONLY)
Alcohol use: No     Alcohol/week: 0.0 standard drinks         Past Medical History:   Diagnosis Date    Cancer ([facility] [address]     breast    History of breast cancer     Left    History of uterine leiomyoma      Family History   Problem Relation Age of Onset    Cancer Maternal Grandmother     Ovarian Cancer Maternal Grandmother     Breast Cancer Maternal Aunt     Other Father         MVA     Past Surgical History:   Procedure Laterality Date     SECTION      HYSTERECTOMY, TOTAL ABDOMINAL  2005    MASTECTOMY Left 2014     Social History     Tobacco Use    Smoking status: Never Smoker    Smokeless tobacco: Never Used   Substance Use Topics    Alcohol use: No     Alcohol/week: 0.0 standard drinks    Drug use: No     Review of Systems   Constitutional: Positive for appetite change. Negative for chills, fatigue and fever. Gastrointestinal: Positive for abdominal pain. Negative for blood in stool, constipation, diarrhea, nausea and vomiting. Objective:   Physical Exam   Constitutional: She is oriented to person, place, and time. She appears well-developed and well-nourished. /76 (Site: Right Upper Arm, Position: Sitting, Cuff Size: Large Adult)   Pulse 67   Temp 98 °F (36.7 °C) (Temporal)   Ht 5' 6\" (1.676 m)   Wt 216 lb 12.8 oz (98.3 kg)   SpO2 99%   BMI 34.99 kg/m²      HENT:   Head: Normocephalic. Mouth/Throat: Oropharynx is clear and moist.   Eyes: Pupils are equal, round, and reactive to light. Conjunctivae and EOM are normal.   Neck: No tracheal deviation present. No thyromegaly present. Cardiovascular: Normal rate, regular rhythm, normal heart sounds and intact distal pulses. Pulmonary/Chest: Effort normal and breath sounds normal.   Abdominal: Soft. Bowel sounds are normal. There is no hepatosplenomegaly, splenomegaly or hepatomegaly. There is tenderness in the right upper quadrant. There is no CVA tenderness, no tenderness at McBurney's point and negative Wilkerson's sign. Musculoskeletal: Normal range of motion. She exhibits no edema. Neurological: She is alert and oriented to person, place, and time. Skin: Skin is warm and dry. Psychiatric: She has a normal mood and affect. Her behavior is normal. Judgment and thought content normal.     Vitals:    09/03/19 1053   BP: 120/76   Pulse: 67   Temp: 98 °F (36.7 °C)   SpO2: 99%       Assessment:      Chronic Cholecystitis with Cholelithiasis      Plan:      Plan: The risks, benefits, and options were discussed with the pt. She is willing to accept all risks and proceed with a Lap. Cholecystectomy. The surgical risks included but not limited to bleeding, infection, perforation, risk of common duct injury, risk of needing an open surgery, etc. The anesthetic risks included heart attacks, strokes, pneumonia, phlebitis, etc.  She is willing to accept all risks and proceed with surgery. No guarantees have been given.       Electronically signed by Simone Bazzi PA-C on 9/3/19 at 5:32 PM

## 2019-09-04 ENCOUNTER — ANESTHESIA EVENT (OUTPATIENT)
Dept: OPERATING ROOM | Age: 54
End: 2019-09-04

## 2019-09-05 LAB
EKG P AXIS: 26 DEGREES
EKG P-R INTERVAL: 180 MS
EKG Q-T INTERVAL: 422 MS
EKG QRS DURATION: 88 MS
EKG QTC CALCULATION (BAZETT): 421 MS
EKG T AXIS: 39 DEGREES

## 2019-09-06 ENCOUNTER — HOSPITAL ENCOUNTER (OUTPATIENT)
Age: 54
Setting detail: SPECIMEN
Discharge: HOME | End: 2019-09-06
Payer: MEDICAID

## 2019-09-06 ENCOUNTER — ANESTHESIA (OUTPATIENT)
Dept: OPERATING ROOM | Age: 54
End: 2019-09-06

## 2019-09-06 ENCOUNTER — HOSPITAL ENCOUNTER (OUTPATIENT)
Age: 54
Setting detail: OUTPATIENT SURGERY
Discharge: HOME | End: 2019-09-06
Attending: SURGERY | Admitting: SURGERY
Payer: MEDICAID

## 2019-09-06 VITALS
OXYGEN SATURATION: 92 % | SYSTOLIC BLOOD PRESSURE: 139 MMHG | HEIGHT: 66 IN | DIASTOLIC BLOOD PRESSURE: 75 MMHG | WEIGHT: 216 LBS | HEART RATE: 60 BPM | RESPIRATION RATE: 18 BRPM | BODY MASS INDEX: 34.72 KG/M2 | TEMPERATURE: 97.7 F

## 2019-09-06 VITALS
OXYGEN SATURATION: 92 % | SYSTOLIC BLOOD PRESSURE: 226 MMHG | RESPIRATION RATE: 1 BRPM | DIASTOLIC BLOOD PRESSURE: 179 MMHG

## 2019-09-06 DIAGNOSIS — G89.18 POST-OP PAIN: Primary | ICD-10-CM

## 2019-09-06 PROCEDURE — 47562 LAPAROSCOPIC CHOLECYSTECTOMY: CPT | Performed by: SURGERY

## 2019-09-06 PROCEDURE — G8916 PT W IV AB GIVEN ON TIME: HCPCS

## 2019-09-06 PROCEDURE — 88304 TISSUE EXAM BY PATHOLOGIST: CPT

## 2019-09-06 PROCEDURE — G8907 PT DOC NO EVENTS ON DISCHARG: HCPCS

## 2019-09-06 PROCEDURE — 47562 LAPAROSCOPIC CHOLECYSTECTOMY: CPT

## 2019-09-06 RX ORDER — LIDOCAINE HYDROCHLORIDE 10 MG/ML
1 INJECTION, SOLUTION EPIDURAL; INFILTRATION; INTRACAUDAL; PERINEURAL
Status: DISCONTINUED | OUTPATIENT
Start: 2019-09-06 | End: 2019-09-06 | Stop reason: HOSPADM

## 2019-09-06 RX ORDER — ROCURONIUM BROMIDE 10 MG/ML
INJECTION, SOLUTION INTRAVENOUS PRN
Status: DISCONTINUED | OUTPATIENT
Start: 2019-09-06 | End: 2019-09-06 | Stop reason: SDUPTHER

## 2019-09-06 RX ORDER — MORPHINE SULFATE 10 MG/ML
4 INJECTION, SOLUTION INTRAMUSCULAR; INTRAVENOUS
Status: DISCONTINUED | OUTPATIENT
Start: 2019-09-06 | End: 2019-09-06 | Stop reason: HOSPADM

## 2019-09-06 RX ORDER — SODIUM CHLORIDE 0.9 % (FLUSH) 0.9 %
10 SYRINGE (ML) INJECTION EVERY 12 HOURS SCHEDULED
Status: DISCONTINUED | OUTPATIENT
Start: 2019-09-06 | End: 2019-09-06 | Stop reason: HOSPADM

## 2019-09-06 RX ORDER — BUPIVACAINE HYDROCHLORIDE AND EPINEPHRINE 2.5; 5 MG/ML; UG/ML
INJECTION, SOLUTION INFILTRATION; PERINEURAL PRN
Status: DISCONTINUED | OUTPATIENT
Start: 2019-09-06 | End: 2019-09-06 | Stop reason: ALTCHOICE

## 2019-09-06 RX ORDER — ONDANSETRON 2 MG/ML
INJECTION INTRAMUSCULAR; INTRAVENOUS PRN
Status: DISCONTINUED | OUTPATIENT
Start: 2019-09-06 | End: 2019-09-06 | Stop reason: SDUPTHER

## 2019-09-06 RX ORDER — MIDAZOLAM HYDROCHLORIDE 1 MG/ML
INJECTION INTRAMUSCULAR; INTRAVENOUS PRN
Status: DISCONTINUED | OUTPATIENT
Start: 2019-09-06 | End: 2019-09-06 | Stop reason: SDUPTHER

## 2019-09-06 RX ORDER — MORPHINE SULFATE 10 MG/ML
2 INJECTION, SOLUTION INTRAMUSCULAR; INTRAVENOUS
Status: DISCONTINUED | OUTPATIENT
Start: 2019-09-06 | End: 2019-09-06 | Stop reason: HOSPADM

## 2019-09-06 RX ORDER — SODIUM CHLORIDE, SODIUM LACTATE, POTASSIUM CHLORIDE, CALCIUM CHLORIDE 600; 310; 30; 20 MG/100ML; MG/100ML; MG/100ML; MG/100ML
INJECTION, SOLUTION INTRAVENOUS CONTINUOUS
Status: DISCONTINUED | OUTPATIENT
Start: 2019-09-06 | End: 2019-09-06 | Stop reason: HOSPADM

## 2019-09-06 RX ORDER — HYDROMORPHONE HCL 110MG/55ML
0.5 PATIENT CONTROLLED ANALGESIA SYRINGE INTRAVENOUS EVERY 10 MIN PRN
Status: DISCONTINUED | OUTPATIENT
Start: 2019-09-06 | End: 2019-09-06 | Stop reason: HOSPADM

## 2019-09-06 RX ORDER — OXYCODONE HYDROCHLORIDE 5 MG/1
5 TABLET ORAL EVERY 4 HOURS PRN
Status: DISCONTINUED | OUTPATIENT
Start: 2019-09-06 | End: 2019-09-06 | Stop reason: HOSPADM

## 2019-09-06 RX ORDER — LIDOCAINE HYDROCHLORIDE 10 MG/ML
INJECTION, SOLUTION INFILTRATION; PERINEURAL PRN
Status: DISCONTINUED | OUTPATIENT
Start: 2019-09-06 | End: 2019-09-06 | Stop reason: SDUPTHER

## 2019-09-06 RX ORDER — KETOROLAC TROMETHAMINE 30 MG/ML
30 INJECTION, SOLUTION INTRAMUSCULAR; INTRAVENOUS ONCE
Status: COMPLETED | OUTPATIENT
Start: 2019-09-06 | End: 2019-09-06

## 2019-09-06 RX ORDER — ONDANSETRON 2 MG/ML
4 INJECTION INTRAMUSCULAR; INTRAVENOUS EVERY 6 HOURS PRN
Status: DISCONTINUED | OUTPATIENT
Start: 2019-09-06 | End: 2019-09-06 | Stop reason: HOSPADM

## 2019-09-06 RX ORDER — FENTANYL CITRATE 50 UG/ML
INJECTION, SOLUTION INTRAMUSCULAR; INTRAVENOUS PRN
Status: DISCONTINUED | OUTPATIENT
Start: 2019-09-06 | End: 2019-09-06 | Stop reason: SDUPTHER

## 2019-09-06 RX ORDER — FENTANYL CITRATE 50 UG/ML
50 INJECTION, SOLUTION INTRAMUSCULAR; INTRAVENOUS EVERY 10 MIN PRN
Status: DISCONTINUED | OUTPATIENT
Start: 2019-09-06 | End: 2019-09-06 | Stop reason: HOSPADM

## 2019-09-06 RX ORDER — SODIUM CHLORIDE 0.9 % (FLUSH) 0.9 %
10 SYRINGE (ML) INJECTION PRN
Status: DISCONTINUED | OUTPATIENT
Start: 2019-09-06 | End: 2019-09-06 | Stop reason: HOSPADM

## 2019-09-06 RX ORDER — ONDANSETRON 4 MG/1
4 TABLET, FILM COATED ORAL ONCE
Status: COMPLETED | OUTPATIENT
Start: 2019-09-06 | End: 2019-09-06

## 2019-09-06 RX ORDER — HYDROCODONE BITARTRATE AND ACETAMINOPHEN 5; 325 MG/1; MG/1
1 TABLET ORAL EVERY 6 HOURS PRN
Status: DISCONTINUED | OUTPATIENT
Start: 2019-09-06 | End: 2019-09-06 | Stop reason: HOSPADM

## 2019-09-06 RX ORDER — PROPOFOL 10 MG/ML
INJECTION, EMULSION INTRAVENOUS PRN
Status: DISCONTINUED | OUTPATIENT
Start: 2019-09-06 | End: 2019-09-06 | Stop reason: SDUPTHER

## 2019-09-06 RX ORDER — DEXAMETHASONE SODIUM PHOSPHATE 4 MG/ML
INJECTION, SOLUTION INTRA-ARTICULAR; INTRALESIONAL; INTRAMUSCULAR; INTRAVENOUS; SOFT TISSUE PRN
Status: DISCONTINUED | OUTPATIENT
Start: 2019-09-06 | End: 2019-09-06 | Stop reason: SDUPTHER

## 2019-09-06 RX ORDER — HYDROCODONE BITARTRATE AND ACETAMINOPHEN 5; 325 MG/1; MG/1
2 TABLET ORAL EVERY 6 HOURS PRN
Qty: 30 TABLET | Refills: 0 | Status: SHIPPED | OUTPATIENT
Start: 2019-09-06 | End: 2019-09-10 | Stop reason: ALTCHOICE

## 2019-09-06 RX ORDER — OXYCODONE HYDROCHLORIDE 5 MG/1
10 TABLET ORAL EVERY 4 HOURS PRN
Status: DISCONTINUED | OUTPATIENT
Start: 2019-09-06 | End: 2019-09-06 | Stop reason: HOSPADM

## 2019-09-06 RX ORDER — SCOLOPAMINE TRANSDERMAL SYSTEM 1 MG/1
1 PATCH, EXTENDED RELEASE TRANSDERMAL
Status: DISCONTINUED | OUTPATIENT
Start: 2019-09-06 | End: 2019-09-06 | Stop reason: HOSPADM

## 2019-09-06 RX ORDER — SUCCINYLCHOLINE CHLORIDE 20 MG/ML
INJECTION INTRAMUSCULAR; INTRAVENOUS PRN
Status: DISCONTINUED | OUTPATIENT
Start: 2019-09-06 | End: 2019-09-06 | Stop reason: SDUPTHER

## 2019-09-06 RX ADMIN — MIDAZOLAM HYDROCHLORIDE 1 MG: 1 INJECTION INTRAMUSCULAR; INTRAVENOUS at 09:51

## 2019-09-06 RX ADMIN — ONDANSETRON 4 MG: 4 TABLET, FILM COATED ORAL at 12:14

## 2019-09-06 RX ADMIN — HYDROCODONE BITARTRATE AND ACETAMINOPHEN 1 TABLET: 5; 325 TABLET ORAL at 12:13

## 2019-09-06 RX ADMIN — FENTANYL CITRATE 50 MCG: 50 INJECTION, SOLUTION INTRAMUSCULAR; INTRAVENOUS at 10:46

## 2019-09-06 RX ADMIN — FENTANYL CITRATE 50 MCG: 50 INJECTION, SOLUTION INTRAMUSCULAR; INTRAVENOUS at 09:51

## 2019-09-06 RX ADMIN — Medication 0.5 MG: at 11:41

## 2019-09-06 RX ADMIN — LIDOCAINE HYDROCHLORIDE 20 MG: 10 INJECTION, SOLUTION INFILTRATION; PERINEURAL at 09:52

## 2019-09-06 RX ADMIN — SODIUM CHLORIDE, SODIUM LACTATE, POTASSIUM CHLORIDE, CALCIUM CHLORIDE: 600; 310; 30; 20 INJECTION, SOLUTION INTRAVENOUS at 08:49

## 2019-09-06 RX ADMIN — PROPOFOL 70 MG: 10 INJECTION, EMULSION INTRAVENOUS at 09:52

## 2019-09-06 RX ADMIN — DEXAMETHASONE SODIUM PHOSPHATE 4 MG: 4 INJECTION, SOLUTION INTRA-ARTICULAR; INTRALESIONAL; INTRAMUSCULAR; INTRAVENOUS; SOFT TISSUE at 10:26

## 2019-09-06 RX ADMIN — ROCURONIUM BROMIDE 5 MG: 10 INJECTION, SOLUTION INTRAVENOUS at 09:52

## 2019-09-06 RX ADMIN — PROPOFOL 100 MG: 10 INJECTION, EMULSION INTRAVENOUS at 09:53

## 2019-09-06 RX ADMIN — SUCCINYLCHOLINE CHLORIDE 120 MG: 20 INJECTION INTRAMUSCULAR; INTRAVENOUS at 09:53

## 2019-09-06 RX ADMIN — ONDANSETRON 4 MG: 2 INJECTION INTRAMUSCULAR; INTRAVENOUS at 10:26

## 2019-09-06 RX ADMIN — KETOROLAC TROMETHAMINE 30 MG: 30 INJECTION, SOLUTION INTRAMUSCULAR; INTRAVENOUS at 11:19

## 2019-09-06 RX ADMIN — ROCURONIUM BROMIDE 10 MG: 10 INJECTION, SOLUTION INTRAVENOUS at 10:11

## 2019-09-06 RX ADMIN — FENTANYL CITRATE 50 MCG: 50 INJECTION, SOLUTION INTRAMUSCULAR; INTRAVENOUS at 11:21

## 2019-09-06 ASSESSMENT — PAIN SCALES - GENERAL: PAINLEVEL_OUTOF10: 7

## 2019-09-06 NOTE — OP NOTE
then identified and dissected circumferentially using a combination of electrocautery and blunt dissection. Dissection continued along the posterior edge of the liver, onto the cystic plate. Once these two structures were the only two structures identified entering the gallbladder with the liver visible behind them, it was determined that a critical view of safety had been obtained. The cystic artery and cystic duct were then clipped twice proximally and once distally. They were divided using scissors. The gallbladder was then dissected off the liver bed using cautery. The gallbladder was then removed from the abdominal cavity using and endocatch bag. The surgical field was irrigated and suctioned. The clips were inspected and found to be in good position. There was good hemostasis and no evidence of bile leak. The camera was then inserted through one of the right upper quadrant ports and the umbilical port was inspected. There was no evidence of port placement injury. The epigastric port fascia was then closed using an 0-vicryl and a suture passer. The right upper quadrant ports were removed under direct visualization, there was good hemostasis. The remaining laparoscopic equipment was removed, insufflation was removed, and the skin incisions were closed using 4-0 monocryl subcuticular stitches. Sterile dressings with dermabond were applied. The patient tolerated the procedure well, was extubated, and transferred to the recovery area in stable condition.                   Kay Ledesma MD   Electronically signed 09/06/19   11:15 AM

## 2019-09-06 NOTE — ANESTHESIA PRE PROCEDURE
[facility], [address]    Anesthesia Evaluation  Patient summary reviewed and Nursing notes reviewed  Airway: Mallampati: II  TM distance: >3 FB   Neck ROM: full  Mouth opening: > = 3 FB Dental: normal exam         Pulmonary:Negative Pulmonary ROS and normal exam                               Cardiovascular:Negative CV ROS                      Neuro/Psych:   Negative Neuro/Psych ROS              GI/Hepatic/Renal: Neg GI/Hepatic/Renal ROS            Endo/Other: Negative Endo/Other ROS                    Abdominal:           Vascular: negative vascular ROS. Anesthesia Plan      general     ASA 1       Induction: intravenous. MIPS: Postoperative opioids intended and Prophylactic antiemetics administered. Anesthetic plan and risks discussed with patient and spouse. Plan discussed with CRNA.                   Tariq Graham, APRN - CRNA   9/6/2019

## 2019-09-10 ENCOUNTER — OFFICE VISIT (OUTPATIENT)
Dept: INTERNAL MEDICINE | Age: 54
End: 2019-09-10
Payer: MEDICAID

## 2019-09-10 VITALS
HEIGHT: 66 IN | DIASTOLIC BLOOD PRESSURE: 86 MMHG | SYSTOLIC BLOOD PRESSURE: 126 MMHG | WEIGHT: 212 LBS | BODY MASS INDEX: 34.07 KG/M2 | HEART RATE: 68 BPM | RESPIRATION RATE: 18 BRPM | OXYGEN SATURATION: 94 %

## 2019-09-10 DIAGNOSIS — Z00.00 ROUTINE HEALTH MAINTENANCE: ICD-10-CM

## 2019-09-10 DIAGNOSIS — R79.89 ELEVATED LFTS: ICD-10-CM

## 2019-09-10 PROCEDURE — 99396 PREV VISIT EST AGE 40-64: CPT | Performed by: INTERNAL MEDICINE

## 2019-09-10 ASSESSMENT — PATIENT HEALTH QUESTIONNAIRE - PHQ9
SUM OF ALL RESPONSES TO PHQ QUESTIONS 1-9: 0
1. LITTLE INTEREST OR PLEASURE IN DOING THINGS: 0
SUM OF ALL RESPONSES TO PHQ QUESTIONS 1-9: 0
2. FEELING DOWN, DEPRESSED OR HOPELESS: 0
SUM OF ALL RESPONSES TO PHQ9 QUESTIONS 1 & 2: 0

## 2019-09-11 ASSESSMENT — ENCOUNTER SYMPTOMS
ABDOMINAL DISTENTION: 0
ABDOMINAL PAIN: 1
COLOR CHANGE: 0
CHOKING: 0
BACK PAIN: 0
PHOTOPHOBIA: 0
SORE THROAT: 0
VOMITING: 0
CHEST TIGHTNESS: 0
DIARRHEA: 0
EYE REDNESS: 0
RHINORRHEA: 0
RECTAL PAIN: 0
EYE PAIN: 0
FACIAL SWELLING: 0
TROUBLE SWALLOWING: 0
COUGH: 0
EYE ITCHING: 0
EYE DISCHARGE: 0
SHORTNESS OF BREATH: 0
WHEEZING: 0
ANAL BLEEDING: 0
SINUS PRESSURE: 0
VOICE CHANGE: 0
NAUSEA: 0
BLOOD IN STOOL: 0
CONSTIPATION: 0

## 2019-09-19 ENCOUNTER — OFFICE VISIT (OUTPATIENT)
Dept: SURGERY | Age: 54
End: 2019-09-19

## 2019-09-19 VITALS
HEIGHT: 66 IN | BODY MASS INDEX: 33.75 KG/M2 | SYSTOLIC BLOOD PRESSURE: 122 MMHG | WEIGHT: 210 LBS | DIASTOLIC BLOOD PRESSURE: 74 MMHG

## 2019-09-19 DIAGNOSIS — R79.89 ELEVATED LFTS: ICD-10-CM

## 2019-09-19 DIAGNOSIS — Z90.49 S/P LAPAROSCOPIC CHOLECYSTECTOMY: Primary | ICD-10-CM

## 2019-09-19 LAB
ALBUMIN SERPL-MCNC: 4.2 G/DL (ref 3.5–5.2)
ALP BLD-CCNC: 154 U/L (ref 35–104)
ALT SERPL-CCNC: 29 U/L (ref 5–33)
ANION GAP SERPL CALCULATED.3IONS-SCNC: 12 MMOL/L (ref 7–19)
AST SERPL-CCNC: 28 U/L (ref 5–32)
BILIRUB SERPL-MCNC: <0.2 MG/DL (ref 0.2–1.2)
BUN BLDV-MCNC: 7 MG/DL (ref 6–20)
CALCIUM SERPL-MCNC: 9.5 MG/DL (ref 8.6–10)
CHLORIDE BLD-SCNC: 105 MMOL/L (ref 98–111)
CO2: 27 MMOL/L (ref 22–29)
CREAT SERPL-MCNC: 0.7 MG/DL (ref 0.5–0.9)
GFR NON-AFRICAN AMERICAN: >60
GLUCOSE BLD-MCNC: 111 MG/DL (ref 74–109)
POTASSIUM SERPL-SCNC: 4.3 MMOL/L (ref 3.5–5)
SODIUM BLD-SCNC: 144 MMOL/L (ref 136–145)
TOTAL PROTEIN: 7.3 G/DL (ref 6.6–8.7)

## 2019-09-19 PROCEDURE — 99024 POSTOP FOLLOW-UP VISIT: CPT | Performed by: PHYSICIAN ASSISTANT

## 2019-09-20 ENCOUNTER — TELEPHONE (OUTPATIENT)
Dept: SURGERY | Age: 54
End: 2019-09-20

## 2019-09-20 RX ORDER — ONDANSETRON 4 MG/1
4 TABLET, ORALLY DISINTEGRATING ORAL 3 TIMES DAILY PRN
Qty: 21 TABLET | Refills: 0 | Status: SHIPPED | OUTPATIENT
Start: 2019-09-20 | End: 2019-12-03 | Stop reason: ALTCHOICE

## 2019-09-25 DIAGNOSIS — C69.90: Primary | ICD-10-CM

## 2019-09-27 ENCOUNTER — OFFICE VISIT (OUTPATIENT)
Dept: ONCOLOGY | Facility: CLINIC | Age: 54
End: 2019-09-27

## 2019-09-27 ENCOUNTER — APPOINTMENT (OUTPATIENT)
Dept: LAB | Facility: HOSPITAL | Age: 54
End: 2019-09-27

## 2019-09-27 VITALS
HEIGHT: 66 IN | SYSTOLIC BLOOD PRESSURE: 132 MMHG | WEIGHT: 206.6 LBS | TEMPERATURE: 98.6 F | DIASTOLIC BLOOD PRESSURE: 96 MMHG | RESPIRATION RATE: 17 BRPM | HEART RATE: 68 BPM | BODY MASS INDEX: 33.2 KG/M2 | OXYGEN SATURATION: 98 %

## 2019-09-27 DIAGNOSIS — C69.90: Primary | ICD-10-CM

## 2019-09-27 DIAGNOSIS — G44.85 PRIMARY STABBING HEADACHE: Primary | ICD-10-CM

## 2019-09-27 DIAGNOSIS — D05.12 DUCTAL CARCINOMA IN SITU (DCIS) OF LEFT BREAST: ICD-10-CM

## 2019-09-27 DIAGNOSIS — H53.8 BLURRY VISION, BILATERAL: ICD-10-CM

## 2019-09-27 LAB
ALBUMIN SERPL-MCNC: 4.4 G/DL (ref 3.5–5.2)
ALBUMIN/GLOB SERPL: 1.5 G/DL
ALP SERPL-CCNC: 144 U/L (ref 39–117)
ALT SERPL W P-5'-P-CCNC: 23 U/L (ref 1–33)
ANION GAP SERPL CALCULATED.3IONS-SCNC: 11 MMOL/L (ref 5–15)
AST SERPL-CCNC: 24 U/L (ref 1–32)
BASOPHILS # BLD AUTO: 0.04 10*3/MM3 (ref 0–0.2)
BASOPHILS NFR BLD AUTO: 0.5 % (ref 0–1.5)
BILIRUB SERPL-MCNC: 0.4 MG/DL (ref 0.2–1.2)
BUN BLD-MCNC: 8 MG/DL (ref 6–20)
BUN/CREAT SERPL: 11.1 (ref 7–25)
CALCIUM SPEC-SCNC: 9.4 MG/DL (ref 8.6–10.5)
CHLORIDE SERPL-SCNC: 103 MMOL/L (ref 98–107)
CO2 SERPL-SCNC: 30 MMOL/L (ref 22–29)
CREAT BLD-MCNC: 0.72 MG/DL (ref 0.57–1)
DEPRECATED RDW RBC AUTO: 43.5 FL (ref 37–54)
EOSINOPHIL # BLD AUTO: 0.12 10*3/MM3 (ref 0–0.4)
EOSINOPHIL NFR BLD AUTO: 1.5 % (ref 0.3–6.2)
ERYTHROCYTE [DISTWIDTH] IN BLOOD BY AUTOMATED COUNT: 13.9 % (ref 12.3–15.4)
GFR SERPL CREATININE-BSD FRML MDRD: 85 ML/MIN/1.73
GLOBULIN UR ELPH-MCNC: 3 GM/DL
GLUCOSE BLD-MCNC: 97 MG/DL (ref 65–99)
HCT VFR BLD AUTO: 43.8 % (ref 34–46.6)
HGB BLD-MCNC: 14.8 G/DL (ref 12–15.9)
HOLD SPECIMEN: NORMAL
IMM GRANULOCYTES # BLD AUTO: 0.03 10*3/MM3 (ref 0–0.05)
IMM GRANULOCYTES NFR BLD AUTO: 0.4 % (ref 0–0.5)
LYMPHOCYTES # BLD AUTO: 1.53 10*3/MM3 (ref 0.7–3.1)
LYMPHOCYTES NFR BLD AUTO: 19.2 % (ref 19.6–45.3)
MCH RBC QN AUTO: 29 PG (ref 26.6–33)
MCHC RBC AUTO-ENTMCNC: 33.8 G/DL (ref 31.5–35.7)
MCV RBC AUTO: 85.7 FL (ref 79–97)
MONOCYTES # BLD AUTO: 0.49 10*3/MM3 (ref 0.1–0.9)
MONOCYTES NFR BLD AUTO: 6.2 % (ref 5–12)
NEUTROPHILS # BLD AUTO: 5.75 10*3/MM3 (ref 1.7–7)
NEUTROPHILS NFR BLD AUTO: 72.2 % (ref 42.7–76)
NRBC BLD AUTO-RTO: 0 /100 WBC (ref 0–0.2)
PLATELET # BLD AUTO: 325 10*3/MM3 (ref 140–450)
PMV BLD AUTO: 10.2 FL (ref 6–12)
POTASSIUM BLD-SCNC: 4.3 MMOL/L (ref 3.5–5.2)
PROT SERPL-MCNC: 7.4 G/DL (ref 6–8.5)
RBC # BLD AUTO: 5.11 10*6/MM3 (ref 3.77–5.28)
SODIUM BLD-SCNC: 144 MMOL/L (ref 136–145)
WBC NRBC COR # BLD: 7.96 10*3/MM3 (ref 3.4–10.8)

## 2019-09-27 PROCEDURE — 36415 COLL VENOUS BLD VENIPUNCTURE: CPT | Performed by: INTERNAL MEDICINE

## 2019-09-27 PROCEDURE — 80053 COMPREHEN METABOLIC PANEL: CPT | Performed by: INTERNAL MEDICINE

## 2019-09-27 PROCEDURE — 85025 COMPLETE CBC W/AUTO DIFF WBC: CPT | Performed by: INTERNAL MEDICINE

## 2019-09-27 PROCEDURE — 99214 OFFICE O/P EST MOD 30 MIN: CPT | Performed by: INTERNAL MEDICINE

## 2019-09-27 NOTE — PROGRESS NOTES
[facility]  HEMATOLOGY & ONCOLOGY        Subjective     VISIT DIAGNOSIS:   Encounter Diagnoses   Name Primary?   • Primary stabbing headache Yes   • Blurry vision, bilateral    • Ductal carcinoma in situ (DCIS) of left breast        REASON FOR VISIT:     Chief Complaint   Patient presents with   • Eye Problem     Pt has suspicious spot on eye.        HEMATOLOGY / ONCOLOGY HISTORY:   Oncology/Hematology History    49 year old female who had onset of menses at age 15. Her last normal menstrual period was at age 39. She used oral contraceptives for  approximately 20 years. She used hormonal manipulation for approximately 1 year. Her first pregnancy was at age 26 and she had 2  children . Patient had a maternal and pancreatic cancer. A maternal grandmother had uterine cancer and colon cancer. A first cousin   of leukemia. There's no history of prostate cancer, breast cancer or ovarian cancer.  Patient had a routine mammogram on 10/15/2014 finding calcifications in the upperouter  quadrant left breast. A unilateral mammogram of  the left breast reveals suspicious microcalcifications category 4. The patient was referred to Dr. Mariana Chaney and a stereotactic biopsy was  performed of the left breast on 11/3/2014. Biopsy was consistent with ductal carcinoma in situ, high grade with central necrosis and  calcification. The area measures 1.2 cm in greatest linear dimension. Breast December profile estrogen receptor: 99% and progesterone  receptor: 94%? HER2/  davi 1+? FISH: Equivocal.  Patient return to the operating room on 2014 and a left breast simple mastectomy was performed showing ductal carcinoma in situ,  solid, comedo and cribriform patterns nuclear grade 23.  No invasive carcinoma. Margins of mastectomy are free of involvement of tumor.  AJCC TNM stage [id number], stage 0.  INTERVAL HISTORY  Since the last visit here, Ms. Tony had a left mastectomy in November. That went well, and no  additional disease was found. She is well  known to have taken birth control pills for many years and ended up with a hysterectomy for fibroids 20 years or so ago. She has not been  walking through the winter and gained, she thinks, 20 pounds. She is vowing to get started back on that, and we had a good discussion at  some length about her symptoms relating to the Arimidex therapy she has taken in an adjuvant setting. She also had a colonoscopy done  again in November 2015 and 1 polyp was found. She knows she will probably be due for another colonoscopy in 3 years. Her grandmother  did have colon cancer, but no one else has had breast cancer. She has had a problem with planning to have a repeat colonoscopy in the  near future as indicated.        Ductal carcinoma in situ of left breast    5/31/2017 Initial Diagnosis     Malignant neoplasm of upper-outer quadrant of left female breast          [No treatment plan]  Cancer Staging Information:  Cancer Staging  No matching staging information was found for the patient.      INTERVAL HISTORY  Patient ID: Madhav Tony is a 53 y.o. year old female with h/o DCIS presenting today because of blurry vision on going for years. She is aeeing Daiana Yarbrough and Samantha for cataract surgery. From her account, they think is is due to the antiestrogen that she took for her DCIS because her level of cataract is advanced for her age. She also mentions that there is concern that breast cancer can metastasize to the eye. She also reports head aches  -denies sob, cp, n/v, she underwent reno mastectomy . Denies any concerning chest wall lesion.  -denies dizziness, double vision or focal weakness  -ros unremarkable.      Review of Systems     See above. Otherwise neg    Medications:    Current Outpatient Medications   Medication Sig Dispense Refill   • calcium carbonate (OS-JAVED) 600 MG tablet Take 600 mg by mouth Daily.     • cholecalciferol (VITAMIN D3) 1000 units tablet Take 1,000 Units by mouth  Daily.     • Multiple Vitamins-Minerals (PRESERVISION AREDS 2 PO) Take 1 tablet by mouth 2 (Two) Times a Day.       No current facility-administered medications for this visit.        ALLERGIES:  No Known Allergies    Objective      Vitals:    09/27/19 0854   BP: 132/96   Pulse: 68   Resp: 17   Temp: 98.6 °F (37 °C)   SpO2: 98%       Current Status 3/28/2018   ECOG score 0       General Appearance: Patient is awake, alert, oriented and in no acute distress. Patient is welldeveloped, wellnourished, and appears stated age.  HEENT: Normocephalic. Sclerae clear, conjunctiva pink, extraocular movements intact, pupils, round, reactive to light and  accommodation. Mouth and throat are clear with moist oral mucosa.  NECK: Supple, no jugular venous distention, thyroid not enlarged.  LYMPH: No cervical, supraclavicular, axillary, or inguinal lymphadenopathy.  CHEST: Equal bilateral expansion, AP  diameter normal, resonant percussion note  LUNGS: Good air movement, no rales, rhonchi, rubs or wheezes with auscultation  CARDIO: Regular sinus rhythm, no murmurs, gallops or rubs.  ABDOMEN: Nondistended, soft, No tenderness, no guarding, no rebound, No hepatosplenomegaly. No abdominal masses. Bowel sounds positive. No hernia  GENITALIA: Not examined.  BREASTS: reno mastectomy. Well healed scar. No concerning chest wall lesion.  MUSKEL: No joint swelling, decreased motion, or inflammation  EXTREMS: No edema, clubbing, cyanosis, No varicose veins.  NEURO: Grossly nonfocal, Gait is coordinated and smooth, Cognition is preserved.  SKIN: No rashes, no ecchymoses, no petechia.  PSYCH: Oriented to time, place and person. Memory is preserved. Mood and affect appear normal      RECENT LABS:  Appointment on 09/27/2019   Component Date Value Ref Range Status   • Extra Tube 09/27/2019 Hold for add-ons.   Final    Auto resulted.   Orders Only on 09/25/2019   Component Date Value Ref Range Status   • Glucose 09/27/2019 97  65 - 99 mg/dL Final   •  BUN 09/27/2019 8  6 - 20 mg/dL Final   • Creatinine 09/27/2019 0.72  0.57 - 1.00 mg/dL Final   • Sodium 09/27/2019 144  136 - 145 mmol/L Final   • Potassium 09/27/2019 4.3  3.5 - 5.2 mmol/L Final   • Chloride 09/27/2019 103  98 - 107 mmol/L Final   • CO2 09/27/2019 30.0* 22.0 - 29.0 mmol/L Final   • Calcium 09/27/2019 9.4  8.6 - 10.5 mg/dL Final   • Total Protein 09/27/2019 7.4  6.0 - 8.5 g/dL Final   • Albumin 09/27/2019 4.40  3.50 - 5.20 g/dL Final   • ALT (SGPT) 09/27/2019 23  1 - 33 U/L Final   • AST (SGOT) 09/27/2019 24  1 - 32 U/L Final   • Alkaline Phosphatase 09/27/2019 144* 39 - 117 U/L Final   • Total Bilirubin 09/27/2019 0.4  0.2 - 1.2 mg/dL Final   • eGFR Non African Amer 09/27/2019 85  >60 mL/min/1.73 Final   • Globulin 09/27/2019 3.0  gm/dL Final   • A/G Ratio 09/27/2019 1.5  g/dL Final   • BUN/Creatinine Ratio 09/27/2019 11.1  7.0 - 25.0 Final   • Anion Gap 09/27/2019 11.0  5.0 - 15.0 mmol/L Final   • WBC 09/27/2019 7.96  3.40 - 10.80 10*3/mm3 Final   • RBC 09/27/2019 5.11  3.77 - 5.28 10*6/mm3 Final   • Hemoglobin 09/27/2019 14.8  12.0 - 15.9 g/dL Final   • Hematocrit 09/27/2019 43.8  34.0 - 46.6 % Final   • MCV 09/27/2019 85.7  79.0 - 97.0 fL Final   • MCH 09/27/2019 29.0  26.6 - 33.0 pg Final   • MCHC 09/27/2019 33.8  31.5 - 35.7 g/dL Final   • RDW 09/27/2019 13.9  12.3 - 15.4 % Final   • RDW-SD 09/27/2019 43.5  37.0 - 54.0 fl Final   • MPV 09/27/2019 10.2  6.0 - 12.0 fL Final   • Platelets 09/27/2019 325  140 - 450 10*3/mm3 Final   • Neutrophil % 09/27/2019 72.2  42.7 - 76.0 % Final   • Lymphocyte % 09/27/2019 19.2* 19.6 - 45.3 % Final   • Monocyte % 09/27/2019 6.2  5.0 - 12.0 % Final   • Eosinophil % 09/27/2019 1.5  0.3 - 6.2 % Final   • Basophil % 09/27/2019 0.5  0.0 - 1.5 % Final   • Immature Grans % 09/27/2019 0.4  0.0 - 0.5 % Final   • Neutrophils, Absolute 09/27/2019 5.75  1.70 - 7.00 10*3/mm3 Final   • Lymphocytes, Absolute 09/27/2019 1.53  0.70 - 3.10 10*3/mm3 Final   • Monocytes, Absolute  09/27/2019 0.49  0.10 - 0.90 10*3/mm3 Final   • Eosinophils, Absolute 09/27/2019 0.12  0.00 - 0.40 10*3/mm3 Final   • Basophils, Absolute 09/27/2019 0.04  0.00 - 0.20 10*3/mm3 Final   • Immature Grans, Absolute 09/27/2019 0.03  0.00 - 0.05 10*3/mm3 Final   • nRBC 09/27/2019 0.0  0.0 - 0.2 /100 WBC Final       RADIOLOGY:  No results found.         Assessment/Plan     1. HAs with blurry vision: mikaela h/o DCIS and possible mets to the brain and orbit, will get a brain MRI to assess.  rtc in a year to f/u with Juan C HO.    2. DCIS: s/p hormonal manipulation, s/p reno mastectomy. Clinically JOHN    3.Cataract: per Dr Yarbrough    rtc in a year with NP for survivorship.       Time Spent: 60 minutes; greater than  50% of time was spent in patient counseling and care coordination.     Ender Loomis MD    9/27/2019    10:17 AM

## 2019-09-30 ENCOUNTER — TELEPHONE (OUTPATIENT)
Dept: SURGERY | Age: 54
End: 2019-09-30

## 2019-10-01 ENCOUNTER — TELEPHONE (OUTPATIENT)
Dept: ONCOLOGY | Facility: CLINIC | Age: 54
End: 2019-10-01

## 2019-10-01 ENCOUNTER — HOSPITAL ENCOUNTER (OUTPATIENT)
Dept: MRI IMAGING | Facility: HOSPITAL | Age: 54
Discharge: HOME | End: 2019-10-01
Admitting: INTERNAL MEDICINE

## 2019-10-01 DIAGNOSIS — H53.8 BLURRY VISION, BILATERAL: ICD-10-CM

## 2019-10-01 DIAGNOSIS — G44.85 PRIMARY STABBING HEADACHE: ICD-10-CM

## 2019-10-01 DIAGNOSIS — D05.12 DUCTAL CARCINOMA IN SITU (DCIS) OF LEFT BREAST: ICD-10-CM

## 2019-10-01 LAB — CREAT BLDA-MCNC: 0.8 MG/DL (ref 0.6–1.3)

## 2019-10-01 PROCEDURE — 0 GADOBENATE DIMEGLUMINE 529 MG/ML SOLUTION: Performed by: INTERNAL MEDICINE

## 2019-10-01 PROCEDURE — 70553 MRI BRAIN STEM W/O & W/DYE: CPT

## 2019-10-01 PROCEDURE — 82565 ASSAY OF CREATININE: CPT

## 2019-10-01 PROCEDURE — A9577 INJ MULTIHANCE: HCPCS | Performed by: INTERNAL MEDICINE

## 2019-10-01 RX ADMIN — GADOBENATE DIMEGLUMINE 20 ML: 529 INJECTION, SOLUTION INTRAVENOUS at 10:49

## 2019-11-07 RX ORDER — METHYLPREDNISOLONE 4 MG/1
TABLET ORAL
Qty: 1 KIT | Refills: 0 | Status: SHIPPED | OUTPATIENT
Start: 2019-11-07 | End: 2019-11-13

## 2019-11-14 ENCOUNTER — OFFICE VISIT (OUTPATIENT)
Dept: INTERNAL MEDICINE | Age: 54
End: 2019-11-14
Payer: MEDICAID

## 2019-11-14 ENCOUNTER — HOSPITAL ENCOUNTER (OUTPATIENT)
Dept: GENERAL RADIOLOGY | Age: 54
Discharge: HOME | End: 2019-11-14
Payer: MEDICAID

## 2019-11-14 VITALS
OXYGEN SATURATION: 98 % | DIASTOLIC BLOOD PRESSURE: 78 MMHG | HEART RATE: 80 BPM | BODY MASS INDEX: 31.5 KG/M2 | SYSTOLIC BLOOD PRESSURE: 124 MMHG | WEIGHT: 196 LBS | RESPIRATION RATE: 18 BRPM | HEIGHT: 66 IN

## 2019-11-14 DIAGNOSIS — R10.9 FLANK PAIN: ICD-10-CM

## 2019-11-14 DIAGNOSIS — M54.50 CHRONIC MIDLINE LOW BACK PAIN, UNSPECIFIED WHETHER SCIATICA PRESENT: ICD-10-CM

## 2019-11-14 DIAGNOSIS — Z12.72 SCREENING FOR VAGINAL CANCER: Primary | ICD-10-CM

## 2019-11-14 DIAGNOSIS — G89.29 CHRONIC MIDLINE LOW BACK PAIN, UNSPECIFIED WHETHER SCIATICA PRESENT: ICD-10-CM

## 2019-11-14 DIAGNOSIS — N39.43 DRIBBLING FOLLOWING URINATION: ICD-10-CM

## 2019-11-14 DIAGNOSIS — G89.29 CHRONIC LOW BACK PAIN, UNSPECIFIED BACK PAIN LATERALITY, UNSPECIFIED WHETHER SCIATICA PRESENT: ICD-10-CM

## 2019-11-14 DIAGNOSIS — M54.50 CHRONIC LOW BACK PAIN, UNSPECIFIED BACK PAIN LATERALITY, UNSPECIFIED WHETHER SCIATICA PRESENT: ICD-10-CM

## 2019-11-14 LAB
BACTERIA: NEGATIVE /HPF
BILIRUBIN URINE: NEGATIVE
BLOOD, URINE: NEGATIVE
CLARITY: ABNORMAL
COLOR: YELLOW
EPITHELIAL CELLS, UA: 3 /HPF (ref 0–5)
GLUCOSE URINE: NEGATIVE MG/DL
HYALINE CASTS: 7 /HPF (ref 0–8)
KETONES, URINE: NEGATIVE MG/DL
LEUKOCYTE ESTERASE, URINE: ABNORMAL
NITRITE, URINE: NEGATIVE
PH UA: 6 (ref 5–8)
PROTEIN UA: NEGATIVE MG/DL
RBC UA: 2 /HPF (ref 0–4)
SPECIFIC GRAVITY UA: 1.01 (ref 1–1.03)
URINE REFLEX TO CULTURE: YES
UROBILINOGEN, URINE: 0.2 E.U./DL
WBC UA: 2 /HPF (ref 0–5)

## 2019-11-14 PROCEDURE — 72100 X-RAY EXAM L-S SPINE 2/3 VWS: CPT

## 2019-11-14 PROCEDURE — 99214 OFFICE O/P EST MOD 30 MIN: CPT | Performed by: INTERNAL MEDICINE

## 2019-11-14 RX ORDER — ANTIOX #8/OM3/DHA/EPA/LUT/ZEAX 250-2.5 MG
CAPSULE ORAL DAILY
COMMUNITY

## 2019-11-14 RX ORDER — MELOXICAM 7.5 MG/1
7.5 TABLET ORAL DAILY
Qty: 30 TABLET | Refills: 0 | Status: SHIPPED | OUTPATIENT
Start: 2019-11-14 | End: 2019-12-13 | Stop reason: SDUPTHER

## 2019-11-15 DIAGNOSIS — Z12.72 SCREENING FOR VAGINAL CANCER: ICD-10-CM

## 2019-11-15 RX ORDER — OXYBUTYNIN CHLORIDE 5 MG/1
5 TABLET, EXTENDED RELEASE ORAL DAILY
Qty: 30 TABLET | Refills: 3 | Status: SHIPPED | OUTPATIENT
Start: 2019-11-15 | End: 2020-03-02

## 2019-11-16 LAB
ORGANISM: ABNORMAL
URINE CULTURE, ROUTINE: ABNORMAL
URINE CULTURE, ROUTINE: ABNORMAL

## 2019-11-18 ASSESSMENT — ENCOUNTER SYMPTOMS
EYE ITCHING: 0
RHINORRHEA: 0
CHEST TIGHTNESS: 0
WHEEZING: 0
FACIAL SWELLING: 0
COUGH: 0
COLOR CHANGE: 0
SHORTNESS OF BREATH: 0
DIARRHEA: 0
VOMITING: 0
EYE REDNESS: 0
EYE DISCHARGE: 0
BACK PAIN: 1
SORE THROAT: 0
CHOKING: 0
BLOOD IN STOOL: 0
ANAL BLEEDING: 0
TROUBLE SWALLOWING: 0
ABDOMINAL PAIN: 0
ABDOMINAL DISTENTION: 0
VOICE CHANGE: 0
PHOTOPHOBIA: 0
CONSTIPATION: 0
NAUSEA: 0
RECTAL PAIN: 0
SINUS PRESSURE: 0
EYE PAIN: 0

## 2019-12-03 ENCOUNTER — OFFICE VISIT (OUTPATIENT)
Dept: INTERNAL MEDICINE | Age: 54
End: 2019-12-03
Payer: MEDICAID

## 2019-12-03 VITALS
OXYGEN SATURATION: 97 % | BODY MASS INDEX: 31.5 KG/M2 | SYSTOLIC BLOOD PRESSURE: 124 MMHG | WEIGHT: 196 LBS | HEIGHT: 66 IN | DIASTOLIC BLOOD PRESSURE: 74 MMHG | HEART RATE: 66 BPM

## 2019-12-03 DIAGNOSIS — M54.50 ACUTE LOW BACK PAIN, UNSPECIFIED BACK PAIN LATERALITY, UNSPECIFIED WHETHER SCIATICA PRESENT: ICD-10-CM

## 2019-12-03 DIAGNOSIS — E55.9 VITAMIN D DEFICIENCY: ICD-10-CM

## 2019-12-03 DIAGNOSIS — Z23 NEED FOR PROPHYLACTIC VACCINATION AGAINST DIPHTHERIA-TETANUS-PERTUSSIS (DTP): Primary | ICD-10-CM

## 2019-12-03 DIAGNOSIS — N32.81 OAB (OVERACTIVE BLADDER): ICD-10-CM

## 2019-12-03 PROCEDURE — 99213 OFFICE O/P EST LOW 20 MIN: CPT | Performed by: INTERNAL MEDICINE

## 2019-12-04 ASSESSMENT — ENCOUNTER SYMPTOMS
SORE THROAT: 0
SINUS PRESSURE: 0
BLOOD IN STOOL: 0
ANAL BLEEDING: 0
COLOR CHANGE: 0
EYE PAIN: 0
EYE ITCHING: 0
CHEST TIGHTNESS: 0
CHOKING: 0
VOICE CHANGE: 0
COUGH: 0
FACIAL SWELLING: 0
RECTAL PAIN: 0
WHEEZING: 0
NAUSEA: 0
ABDOMINAL PAIN: 0
DIARRHEA: 0
ABDOMINAL DISTENTION: 0
EYE DISCHARGE: 0
PHOTOPHOBIA: 0
TROUBLE SWALLOWING: 0
EYE REDNESS: 0
VOMITING: 0
SHORTNESS OF BREATH: 0
RHINORRHEA: 0
CONSTIPATION: 0
BACK PAIN: 0

## 2019-12-16 RX ORDER — MELOXICAM 7.5 MG/1
7.5 TABLET ORAL DAILY
Qty: 30 TABLET | Refills: 0 | Status: SHIPPED | OUTPATIENT
Start: 2019-12-16 | End: 2022-09-07 | Stop reason: ALTCHOICE

## 2020-03-02 RX ORDER — OXYBUTYNIN CHLORIDE 5 MG/1
5 TABLET, EXTENDED RELEASE ORAL DAILY
Qty: 30 TABLET | Refills: 3 | Status: SHIPPED | OUTPATIENT
Start: 2020-03-02 | End: 2020-06-29

## 2020-04-08 ENCOUNTER — TELEPHONE (OUTPATIENT)
Dept: INTERNAL MEDICINE | Age: 55
End: 2020-04-08

## 2020-04-08 RX ORDER — METHYLPREDNISOLONE 4 MG/1
TABLET ORAL
Qty: 1 KIT | Refills: 0 | Status: SHIPPED | OUTPATIENT
Start: 2020-04-08 | End: 2020-04-14

## 2020-06-29 RX ORDER — OXYBUTYNIN CHLORIDE 5 MG/1
5 TABLET, EXTENDED RELEASE ORAL DAILY
Qty: 30 TABLET | Refills: 3 | Status: SHIPPED | OUTPATIENT
Start: 2020-06-29 | End: 2020-09-03 | Stop reason: SDUPTHER

## 2020-08-25 DIAGNOSIS — E55.9 VITAMIN D DEFICIENCY: ICD-10-CM

## 2020-08-25 DIAGNOSIS — N32.81 OAB (OVERACTIVE BLADDER): ICD-10-CM

## 2020-08-25 LAB
ALBUMIN SERPL-MCNC: 4.3 G/DL (ref 3.5–5.2)
ALP BLD-CCNC: 139 U/L (ref 35–104)
ALT SERPL-CCNC: 16 U/L (ref 5–33)
ANION GAP SERPL CALCULATED.3IONS-SCNC: 12 MMOL/L (ref 7–19)
AST SERPL-CCNC: 22 U/L (ref 5–32)
BASOPHILS ABSOLUTE: 0 K/UL (ref 0–0.2)
BASOPHILS RELATIVE PERCENT: 0.7 % (ref 0–1)
BILIRUB SERPL-MCNC: 0.3 MG/DL (ref 0.2–1.2)
BUN BLDV-MCNC: 8 MG/DL (ref 6–20)
CALCIUM SERPL-MCNC: 9.6 MG/DL (ref 8.6–10)
CHLORIDE BLD-SCNC: 104 MMOL/L (ref 98–111)
CHOLESTEROL, TOTAL: 149 MG/DL (ref 160–199)
CO2: 27 MMOL/L (ref 22–29)
CREAT SERPL-MCNC: 0.7 MG/DL (ref 0.5–0.9)
EOSINOPHILS ABSOLUTE: 0.2 K/UL (ref 0–0.6)
EOSINOPHILS RELATIVE PERCENT: 3.8 % (ref 0–5)
GFR AFRICAN AMERICAN: >59
GFR NON-AFRICAN AMERICAN: >60
GLUCOSE BLD-MCNC: 81 MG/DL (ref 74–109)
HCT VFR BLD CALC: 43.1 % (ref 37–47)
HDLC SERPL-MCNC: 54 MG/DL (ref 65–121)
HEMOGLOBIN: 13.8 G/DL (ref 12–16)
IMMATURE GRANULOCYTES #: 0 K/UL
LDL CHOLESTEROL CALCULATED: 80 MG/DL
LYMPHOCYTES ABSOLUTE: 1.6 K/UL (ref 1.1–4.5)
LYMPHOCYTES RELATIVE PERCENT: 27.7 % (ref 20–40)
MCH RBC QN AUTO: 29.7 PG (ref 27–31)
MCHC RBC AUTO-ENTMCNC: 32 G/DL (ref 33–37)
MCV RBC AUTO: 92.7 FL (ref 81–99)
MONOCYTES ABSOLUTE: 0.4 K/UL (ref 0–0.9)
MONOCYTES RELATIVE PERCENT: 6.5 % (ref 0–10)
NEUTROPHILS ABSOLUTE: 3.6 K/UL (ref 1.5–7.5)
NEUTROPHILS RELATIVE PERCENT: 61.1 % (ref 50–65)
PDW BLD-RTO: 12.8 % (ref 11.5–14.5)
PLATELET # BLD: 254 K/UL (ref 130–400)
PMV BLD AUTO: 10.7 FL (ref 9.4–12.3)
POTASSIUM SERPL-SCNC: 4.1 MMOL/L (ref 3.5–5)
RBC # BLD: 4.65 M/UL (ref 4.2–5.4)
SODIUM BLD-SCNC: 143 MMOL/L (ref 136–145)
TOTAL PROTEIN: 6.5 G/DL (ref 6.6–8.7)
TRIGL SERPL-MCNC: 75 MG/DL (ref 0–149)
TSH SERPL DL<=0.05 MIU/L-ACNC: 1.95 UIU/ML (ref 0.27–4.2)
VITAMIN D 25-HYDROXY: 49 NG/ML
WBC # BLD: 5.8 K/UL (ref 4.8–10.8)

## 2020-09-03 ENCOUNTER — OFFICE VISIT (OUTPATIENT)
Dept: INTERNAL MEDICINE | Age: 55
End: 2020-09-03
Payer: MEDICAID

## 2020-09-03 VITALS
BODY MASS INDEX: 25.23 KG/M2 | SYSTOLIC BLOOD PRESSURE: 124 MMHG | HEART RATE: 60 BPM | HEIGHT: 66 IN | DIASTOLIC BLOOD PRESSURE: 72 MMHG | OXYGEN SATURATION: 98 % | WEIGHT: 157 LBS

## 2020-09-03 PROCEDURE — 99396 PREV VISIT EST AGE 40-64: CPT | Performed by: INTERNAL MEDICINE

## 2020-09-03 RX ORDER — OXYBUTYNIN CHLORIDE 5 MG/1
5 TABLET, EXTENDED RELEASE ORAL DAILY
Qty: 30 TABLET | Refills: 3 | Status: SHIPPED | OUTPATIENT
Start: 2020-09-03 | End: 2021-01-05

## 2020-09-03 ASSESSMENT — ENCOUNTER SYMPTOMS
NAUSEA: 0
ABDOMINAL DISTENTION: 0
RHINORRHEA: 0
CHOKING: 0
SINUS PRESSURE: 0
COLOR CHANGE: 0
PHOTOPHOBIA: 0
SORE THROAT: 0
BACK PAIN: 0
CONSTIPATION: 0
ABDOMINAL PAIN: 0
EYE REDNESS: 0
EYE DISCHARGE: 0
RECTAL PAIN: 0
VOICE CHANGE: 0
DIARRHEA: 0
FACIAL SWELLING: 0
EYE PAIN: 0
EYE ITCHING: 0
CHEST TIGHTNESS: 0
COUGH: 0
ANAL BLEEDING: 0
VOMITING: 0
SHORTNESS OF BREATH: 0
TROUBLE SWALLOWING: 0
WHEEZING: 0
BLOOD IN STOOL: 0

## 2020-09-03 ASSESSMENT — PATIENT HEALTH QUESTIONNAIRE - PHQ9
2. FEELING DOWN, DEPRESSED OR HOPELESS: 0
1. LITTLE INTEREST OR PLEASURE IN DOING THINGS: 0
SUM OF ALL RESPONSES TO PHQ9 QUESTIONS 1 & 2: 0
SUM OF ALL RESPONSES TO PHQ QUESTIONS 1-9: 0
SUM OF ALL RESPONSES TO PHQ QUESTIONS 1-9: 0

## 2020-09-03 NOTE — PROGRESS NOTES
Chief Complaint   Patient presents with    Annual Exam       HPI: José Miguel Juan is a 47 y.o. female is here for annual physical exam.  Her functional status is good. She denies any history of falls. She has no concerns in regards to safety, hearing, or cognition. She has had a history of breast cancer. She has had a bilateral mastectomy. Now, she does notice a small nodule at the right breast mastectomy site. Her cholesterol is 149. Her vitamin D levels within normal limits. Her medications do work well for her overactive bladder.     Routine screening is as follows:  Eye exam yearly  Flu vaccine yearly  Pap done today  Mammogram: Has has bilateral mastectomies  Colonoscopy       Past Medical History:   Diagnosis Date    [facility])     breast    History of breast cancer     Left    History of uterine leiomyoma       Past Surgical History:   Procedure Laterality Date     SECTION      CHOLECYSTECTOMY, LAPAROSCOPIC N/A 2019    CHOLECYSTECTOMY LAPAROSCOPIC performed by Carrol Mendosa MD at [place] [address]  2020    HYSTERECTOMY, TOTAL ABDOMINAL  2005    MASTECTOMY Left 2014    MASTECTOMY Right       Social History     Socioeconomic History    Marital status:      Spouse name: None    Number of children: None    Years of education: None    Highest education level: None   Occupational History    None   Social Needs    Financial resource strain: None    Food insecurity     Worry: None     Inability: None    Transportation needs     Medical: None     Non-medical: None   Tobacco Use    Smoking status: Never Smoker    Smokeless tobacco: Never Used   Substance and Sexual Activity    Alcohol use: No     Alcohol/week: 0.0 standard drinks    Drug use: No    Sexual activity: None   Lifestyle    Physical activity     Days per week: None     Minutes per session: None    Stress: None   Relationships    Social connections     Talks on phone: None     Gets exudate or posterior oropharyngeal erythema. Eyes:      General: No scleral icterus. Right eye: No discharge. Left eye: No discharge. Extraocular Movements: Extraocular movements intact. Conjunctiva/sclera: Conjunctivae normal.      Pupils: Pupils are equal, round, and reactive to light. Neck:      Musculoskeletal: Normal range of motion and neck supple. No neck rigidity or muscular tenderness. Thyroid: No thyromegaly. Vascular: No carotid bruit or JVD. Trachea: No tracheal deviation. Cardiovascular:      Rate and Rhythm: Normal rate and regular rhythm. Pulses: Normal pulses. Heart sounds: Normal heart sounds. No murmur. No friction rub. No gallop. Pulmonary:      Effort: Pulmonary effort is normal. No respiratory distress. Breath sounds: Normal breath sounds. No stridor. No wheezing, rhonchi or rales. Chest:      Chest wall: No tenderness. Abdominal:      General: Bowel sounds are normal. There is no distension. Palpations: Abdomen is soft. There is no mass. Tenderness: There is no abdominal tenderness. There is no right CVA tenderness, left CVA tenderness, guarding or rebound. Hernia: No hernia is present. There is no hernia in the left inguinal area or right inguinal area. Genitourinary:     Pubic Area: No rash. Labia:         Right: No rash, tenderness, lesion or injury. Left: No rash, tenderness, lesion or injury. Vagina: Normal.      Uterus: Absent. Adnexa: Right adnexa normal and left adnexa normal.      Comments: Patient has had a bilateral mastectomy. However, there is a palpable nodule to the right anterior chest wall. Musculoskeletal: Normal range of motion. General: No swelling, tenderness, deformity or signs of injury. Right lower leg: No edema. Left lower leg: No edema. Lymphadenopathy:      Cervical: No cervical adenopathy. Lower Body: No right inguinal adenopathy. No left inguinal adenopathy. Skin:     General: Skin is warm and dry. Capillary Refill: Capillary refill takes less than 2 seconds. Coloration: Skin is not jaundiced or pale. Findings: No bruising, erythema, lesion or rash. Neurological:      General: No focal deficit present. Mental Status: She is alert and oriented to person, place, and time. Mental status is at baseline. Cranial Nerves: No cranial nerve deficit. Sensory: No sensory deficit. Motor: No weakness or abnormal muscle tone. Coordination: Coordination normal.      Gait: Gait normal.      Deep Tendon Reflexes: Reflexes are normal and symmetric. Reflexes normal.   Psychiatric:         Mood and Affect: Mood normal.         Behavior: Behavior normal.         Thought Content: Thought content normal.         Judgment: Judgment normal.         No diagnosis found. ASSESSMENT/PLAN:    66-year-old woman here for her annual physical exam    1. Health maintenance: Routine screenings as per HPI. Labs discussed with patient today    2. Overactive bladder: Continue oxybutynin    3. Vitamin D deficiency: Continue cholecalciferol    4. Nodule to right anterior chest wall: Patient has had a history of left breast cancer. Going to check an ultrasound for further evaluation. There are no diagnoses linked to this encounter. No follow-ups on file. No orders of the defined types were placed in this encounter.       Salvador Carr MD

## 2020-09-09 ENCOUNTER — HOSPITAL ENCOUNTER (OUTPATIENT)
Dept: WOMENS IMAGING | Age: 55
Discharge: HOME | End: 2020-09-09
Payer: MEDICAID

## 2020-09-09 PROCEDURE — 76642 ULTRASOUND BREAST LIMITED: CPT

## 2020-09-29 ENCOUNTER — LAB (OUTPATIENT)
Dept: LAB | Facility: HOSPITAL | Age: 55
End: 2020-09-29

## 2020-09-29 ENCOUNTER — OFFICE VISIT (OUTPATIENT)
Dept: ONCOLOGY | Facility: CLINIC | Age: 55
End: 2020-09-29

## 2020-09-29 VITALS
HEIGHT: 66 IN | HEART RATE: 52 BPM | SYSTOLIC BLOOD PRESSURE: 140 MMHG | OXYGEN SATURATION: 98 % | TEMPERATURE: 97.5 F | BODY MASS INDEX: 25.07 KG/M2 | DIASTOLIC BLOOD PRESSURE: 78 MMHG | WEIGHT: 156 LBS | RESPIRATION RATE: 16 BRPM

## 2020-09-29 DIAGNOSIS — D05.12 DUCTAL CARCINOMA IN SITU OF LEFT BREAST: Primary | ICD-10-CM

## 2020-09-29 PROCEDURE — 99213 OFFICE O/P EST LOW 20 MIN: CPT | Performed by: NURSE PRACTITIONER

## 2020-09-29 RX ORDER — OXYBUTYNIN CHLORIDE 5 MG/1
5 TABLET ORAL 3 TIMES DAILY
COMMUNITY
End: 2022-09-28 | Stop reason: ALTCHOICE

## 2020-09-29 NOTE — PROGRESS NOTES
[facility]  HEMATOLOGY & ONCOLOGY    W ONC [facility] HEMATOLOGY AND ONCOLOGY  [address]  [facility] [address] [phone]    Patient Name: Madhav Tony  Encounter Date: 09/29/2020  YOB: 1965  Patient Number: [id number]    Chief Complaint   Patient presents with   • Breast Cancer     Here for yearly f/u       REASON FOR VISIT: Mrs. 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  She had a routine mammogram October 2014 finding calcifications in the upper outer quadrant of the left breast.  She was referred to Dr. Funes and stereotactic biopsy was performed on November 3, 2014.  The biopsy was consistent with ductal carcinoma in situ, high-grade.  The area measured 1.2 cm.  It was ER positive at 99% WA positive at 94% HER-2/davi was equivocal by FISH.  She returned to the operating room on November 17, 2014 and had a left breast simple mastectomy showing ductal carcinoma in situ grade 2.  There was no invasive carcinoma noted.  Margins of the mastectomy were free of involvement.  Her AJCC TNM stage pTis,NX,MX, stage 0.  She was placed on Arimidex therapy     Patient went on to have a prophylactic right mastectomy in 2015 per Dr Huff.  There was no malignancy in this breast.  She then stopped the Arimidex therapy.      On 9/9/2020 she had an US of the right chest wall due to a palpable abnormality and  that found no evidence of malignancy.  She also had a colonoscopy on 8/2/2019     Labs on 8/25/2020 showed a white count of 5.8, hemoglobin 13.8, hematocrit 43.1 and a platelet count of 254,000. CMP was normal except for a slightly elevated alkaline phosphatase of 139 for which had actually improved.    She presents today without complaints.  She has had some problems with her vision, cataracts and macular degeneration and  it was felt to be related to the Arimidex therapy. 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     PAST MEDICAL HISTORY:  ALLERGIES:  No Known Allergies    CURRENT MEDICATIONS:  Outpatient Encounter Medications as of 2020   Medication Sig Dispense Refill   • calcium carbonate (OS-JAVED) 600 MG tablet Take 600 mg by mouth Daily.     • calcium citrate-vitamin d (CALCITRATE) 315-250 MG-UNIT tablet tablet Take  by mouth Daily.     • Multiple Vitamins-Minerals (PRESERVISION AREDS 2 PO) Take 1 tablet by mouth 2 (Two) Times a Day.     • oxybutynin (DITROPAN) 5 MG tablet Take 5 mg by mouth 3 (Three) Times a Day.     • [DISCONTINUED] cholecalciferol (VITAMIN D3) 1000 units tablet Take 1,000 Units by mouth Daily.       No facility-administered encounter medications on file as of 2020.      ADULT ILLNESSES:  Patient Active Problem List   Diagnosis Code   • Ductal carcinoma in situ of left breast D05.12   • Hx of colonic polyps Z86.010   • Family history of colon cancer Z80.0   • Family history of polyps in the colon Z83.71     SURGERIES:  Past Surgical History:   Procedure Laterality Date   • BREAST BIOPSY Right    • CATARACT EXTRACTION, BILATERAL     •  SECTION     • CHOLECYSTECTOMY  2019   • COLONOSCOPY  2015    4mm tubular adenomatous polyp in transverse colon; Repeat 3 years   • COLONOSCOPY N/A 2019    Procedure: COLONOSCOPY WITH ANESTHESIA;  Surgeon: Lamar Gunter MD;  Location: [facility] ENDOSCOPY;  Service: Gastroenterology   • HYSTERECTOMY     • MASTECTOMY Left      HEALTH MAINTENANCE ITEMS:    Last Completed Colonoscopy       Status Date      COLONOSCOPY Done 2019 COLONOSCOPY     Benign polyp removed          FAMILY HISTORY:  Family History   Problem Relation Age of Onset   • No Known Problems Father    • Colon polyps Mother    • No Known Problems Sister    • No Known Problems Brother    • No Known Problems Son    •  "No Known Problems Daughter    • Uterine cancer Maternal Grandfather    • Colon cancer Maternal Grandmother         In her 60's or 70's   • No Known Problems Paternal Grandmother    • No Known Problems Paternal Grandfather      SOCIAL HISTORY:  Social History     Socioeconomic History   • Marital status:      Spouse name: Not on file   • Number of children: Not on file   • Years of education: Not on file   • Highest education level: Not on file   Tobacco Use   • Smoking status: Never Smoker   • Smokeless tobacco: Never Used   Substance and Sexual Activity   • Alcohol use: No   • Drug use: No   • Sexual activity: Defer       REVIEW OF SYSTEMS:  Review of Systems   Constitutional: Negative for activity change, appetite change, chills, diaphoresis, fatigue, fever and unexpected weight loss.   HENT: Negative for ear pain, nosebleeds, sinus pressure, sore throat and voice change.    Eyes: Negative for blurred vision, double vision, pain and visual disturbance.   Respiratory: Negative for cough and shortness of breath.    Cardiovascular: Negative for chest pain, palpitations and leg swelling.   Gastrointestinal: Negative for abdominal pain, anal bleeding, blood in stool, constipation, diarrhea, nausea and vomiting.   Endocrine: Negative for heat intolerance, polydipsia and polyuria.   Genitourinary: Negative for dysuria, frequency, hematuria, urgency and urinary incontinence.   Musculoskeletal: Negative for arthralgias and myalgias.   Skin: Negative for rash and skin lesions.   Neurological: Negative for dizziness, tremors, seizures, syncope, speech difficulty, weakness and headache.   Hematological: Negative for adenopathy. Does not bruise/bleed easily.   Psychiatric/Behavioral: Negative for dysphoric mood, sleep disturbance, suicidal ideas and depressed mood.       /78   Pulse 52   Temp 97.5 °F (36.4 °C) (Temporal)   Resp 16   Ht 167.6 cm (66\")   Wt 70.8 kg (156 lb)   LMP  (LMP Unknown)   SpO2 98%   " Breastfeeding No   BMI 25.18 kg/m²  Body surface area is 1.8 meters squared.  Pain Score    09/29/20 0833   PainSc: 0-No pain       Physical Exam:  Physical Exam  Constitutional:       Appearance: Normal appearance. She is well-developed.   HENT:      Head: Atraumatic.   Eyes:      General: No scleral icterus.     Pupils: Pupils are equal, round, and reactive to light.   Neck:      Musculoskeletal: Neck supple.      Trachea: Trachea normal.   Cardiovascular:      Rate and Rhythm: Normal rate and regular rhythm.      Heart sounds: No murmur. No friction rub. No gallop.    Pulmonary:      Effort: Pulmonary effort is normal.      Breath sounds: Normal breath sounds. No wheezing, rhonchi or rales.   Chest:      Breasts:         Right: Absent.         Left: Absent.      Comments: No palpable abnormality to chest wall  Abdominal:      Palpations: Abdomen is soft.      Tenderness: There is no abdominal tenderness. There is no guarding or rebound.   Lymphadenopathy:      Cervical: No cervical adenopathy.      Upper Body:      Right upper body: No supraclavicular or axillary adenopathy.      Left upper body: No supraclavicular or axillary adenopathy.   Skin:     General: Skin is warm and dry.   Neurological:      Mental Status: She is alert and oriented to person, place, and time.      Sensory: No sensory deficit.   Psychiatric:         Judgment: Judgment normal.         Madhav Tony reports a pain score of 0.    Patient's Body mass index is 25.18 kg/m². BMI is above normal parameters. Recommendations include: defer to pcp.      LABS    Lab Results - Last 18 Months   Lab Units 08/25/20  0811 09/27/19  0848 08/29/19  1433   HEMOGLOBIN g/dL 13.8 14.8 14.4   HEMATOCRIT % 43.1 43.8 45.0   MCV fL 92.7 85.7 88.2   WBC K/uL 5.8 7.96 9.2   RDW % 12.8 13.9 14.3   MPV fL 10.7 10.2 10.5   PLATELETS K/uL 254 325 295   IMM GRAN % %  --  0.4  --    NEUTROS ABS K/uL 3.6 5.75 6.9   LYMPHS ABS K/uL 1.6 1.53 1.6   MONOS ABS K/uL 0.40 0.49  0.50   EOS ABS K/uL 0.20 0.12 0.10   BASOS ABS K/uL 0.00 0.04 0.00   IMMATURE GRANS (ABS) K/uL 0.0 0.03 0.0   NRBC /100 WBC  --  0.0  --        Lab Results - Last 18 Months   Lab Units 08/25/20  0811 10/01/19  1038 09/27/19  0848 09/19/19  0925 08/29/19  1433   GLUCOSE mg/dL 81  --  97 111* 82   SODIUM mmol/L 143  --  144 144 145   POTASSIUM mmol/L 4.1  --  4.3 4.3 4.1   TOTAL CO2 mmol/L 27  --   --  27 25   CO2 mmol/L  --   --  30.0*  --   --    CHLORIDE mmol/L 104  --  103 105 105   ANION GAP mmol/L 12  --  11.0 12 15   CREATININE mg/dL 0.7 0.80 0.72 0.7 0.8   BUN mg/dL 8  --  8 7 6   BUN / CREAT RATIO   --   --  11.1  --   --    CALCIUM mg/dL 9.6  --  9.4 9.5 9.6   EGFR IF NONAFRICN AM  >60  --  85 >60 >60   ALK PHOS U/L 139*  --  144* 154* 246*   TOTAL PROTEIN g/dL 6.5*  --  7.4 7.3 7.4   ALT (SGPT) U/L 16  --  23 29 330*   AST (SGOT) U/L 22  --  24 28 277*   BILIRUBIN mg/dL 0.3  --  0.4 <0.2 0.6   ALBUMIN g/dL 4.3  --  4.40 4.2 4.4   GLOBULIN gm/dL  --   --  3.0  --   --        Lab Results - Last 18 Months   Lab Units 08/25/20  0811 08/29/19  1433   TSH uIU/mL 1.950 0.834     ASSESSMENT  1.  Left Breast DCIS diagnosed in October 2014  · Initial stage: AJCC TNM stage pTis,NX,MX, stage 0. ER positive at 99% WV positive at 94% HER-2/davi was equivocal by FISH.    · Treatment: Left breast simple mastectomy November 17, 2014, Arimidex started December 2014; prophylactic right mastectomy in 2015 and a REM attacks was then stopped by the physician.  · Disease status: No evidence of disease  2.  Bilateral cataracts status post surgery per Dr. Yarbrough  3.  Stress incontinence controlled with Ditropan  4.  Hematologically stable with a normal CBC CMP on 8/25/2020  5.  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  Counseled patient on lab work that was obtained on August 25, 2020 and their normalcy  2.  Encourage patient to continue follow primary care provider and other specialist  3.  Encourage patient to continue  routine screening studies such as colonoscopy  4.  Return in 1 year for follow-up with a pre-office CBC CMP    I spent 20 total minutes, face-to-face, caring for Madhav today.  Greater than 50% of this time involved counseling and/or coordination of care as documented within this note regarding the patient's illness(es), pros and cons of various treatment options, instructions and/or risk reduction.    Santa Boyd, APRN   09/29/2020

## 2020-09-30 PROBLEM — H59.039 IRVINE-GASS SYNDROME: Status: ACTIVE | Noted: 2020-01-01

## 2021-01-05 RX ORDER — OXYBUTYNIN CHLORIDE 5 MG/1
5 TABLET, EXTENDED RELEASE ORAL DAILY
Qty: 30 TABLET | Refills: 3 | Status: SHIPPED | OUTPATIENT
Start: 2021-01-05 | End: 2021-04-26

## 2021-01-05 NOTE — TELEPHONE ENCOUNTER
Annette Strange called requesting a refill of the below medication which has been pended for you:     Requested Prescriptions     Pending Prescriptions Disp Refills    oxybutynin (DITROPAN-XL) 5 MG extended release tablet [Pharmacy Med Name: OXYBUTYNIN ER 5MG TABLETS] 30 tablet 3     Sig: TAKE 1 TABLET BY MOUTH DAILY       Last Appointment Date: 9/3/2020  Next Appointment Date: Visit date not found    No Known Allergies

## 2021-04-26 RX ORDER — OXYBUTYNIN CHLORIDE 5 MG/1
5 TABLET, EXTENDED RELEASE ORAL DAILY
Qty: 30 TABLET | Refills: 3 | Status: SHIPPED | OUTPATIENT
Start: 2021-04-26 | End: 2021-07-27

## 2021-05-21 ENCOUNTER — TELEPHONE (OUTPATIENT)
Dept: INTERNAL MEDICINE | Age: 56
End: 2021-05-21

## 2021-05-21 RX ORDER — CLOBETASOL PROPIONATE 0.5 MG/G
OINTMENT TOPICAL
Qty: 1 TUBE | Refills: 1 | Status: SHIPPED | OUTPATIENT
Start: 2021-05-21

## 2021-05-21 RX ORDER — METHYLPREDNISOLONE 4 MG/1
TABLET ORAL
Qty: 1 KIT | Refills: 0 | Status: SHIPPED | OUTPATIENT
Start: 2021-05-21 | End: 2021-05-27

## 2021-05-21 NOTE — TELEPHONE ENCOUNTER
Pt has gotten into poison ivy and would like something called in. She would also like a cream to help with the itching please.

## 2021-07-27 RX ORDER — OXYBUTYNIN CHLORIDE 5 MG/1
5 TABLET, EXTENDED RELEASE ORAL DAILY
Qty: 30 TABLET | Refills: 3 | Status: SHIPPED | OUTPATIENT
Start: 2021-07-27 | End: 2021-09-03 | Stop reason: SDUPTHER

## 2021-09-03 ENCOUNTER — OFFICE VISIT (OUTPATIENT)
Dept: INTERNAL MEDICINE | Age: 56
End: 2021-09-03
Payer: MEDICAID

## 2021-09-03 VITALS
OXYGEN SATURATION: 97 % | SYSTOLIC BLOOD PRESSURE: 120 MMHG | WEIGHT: 176 LBS | BODY MASS INDEX: 27.62 KG/M2 | DIASTOLIC BLOOD PRESSURE: 84 MMHG | HEART RATE: 61 BPM | HEIGHT: 67 IN

## 2021-09-03 DIAGNOSIS — E55.9 VITAMIN D DEFICIENCY: ICD-10-CM

## 2021-09-03 DIAGNOSIS — D05.12 DUCTAL CARCINOMA IN SITU OF LEFT BREAST: ICD-10-CM

## 2021-09-03 DIAGNOSIS — Z00.00 ROUTINE ADULT HEALTH MAINTENANCE: Primary | ICD-10-CM

## 2021-09-03 DIAGNOSIS — Z13.820 SCREENING FOR OSTEOPOROSIS: ICD-10-CM

## 2021-09-03 DIAGNOSIS — N32.81 OAB (OVERACTIVE BLADDER): ICD-10-CM

## 2021-09-03 PROBLEM — Z83.71 FAMILY HISTORY OF POLYPS IN THE COLON: Status: ACTIVE | Noted: 2019-07-24

## 2021-09-03 PROBLEM — H59.039 IRVINE-GASS SYNDROME: Status: ACTIVE | Noted: 2020-01-01

## 2021-09-03 PROBLEM — Z86.010 HX OF COLONIC POLYPS: Status: ACTIVE | Noted: 2019-07-24

## 2021-09-03 PROBLEM — Z83.719 FAMILY HISTORY OF POLYPS IN THE COLON: Status: ACTIVE | Noted: 2019-07-24

## 2021-09-03 PROBLEM — H35.389 TOXIC MACULOPATHY: Status: ACTIVE | Noted: 2021-07-01

## 2021-09-03 PROBLEM — Z86.0100 HX OF COLONIC POLYPS: Status: ACTIVE | Noted: 2019-07-24

## 2021-09-03 PROBLEM — Z80.0 FAMILY HISTORY OF COLON CANCER: Status: ACTIVE | Noted: 2019-07-24

## 2021-09-03 PROBLEM — Z96.1 PSEUDOPHAKIA: Status: ACTIVE | Noted: 2021-07-01

## 2021-09-03 PROCEDURE — 99396 PREV VISIT EST AGE 40-64: CPT | Performed by: INTERNAL MEDICINE

## 2021-09-03 RX ORDER — OXYBUTYNIN CHLORIDE 10 MG/1
10 TABLET, EXTENDED RELEASE ORAL DAILY
Qty: 30 TABLET | Refills: 5 | Status: SHIPPED | OUTPATIENT
Start: 2021-09-03 | End: 2022-09-07

## 2021-09-03 SDOH — ECONOMIC STABILITY: FOOD INSECURITY: WITHIN THE PAST 12 MONTHS, THE FOOD YOU BOUGHT JUST DIDN'T LAST AND YOU DIDN'T HAVE MONEY TO GET MORE.: NEVER TRUE

## 2021-09-03 SDOH — ECONOMIC STABILITY: FOOD INSECURITY: WITHIN THE PAST 12 MONTHS, YOU WORRIED THAT YOUR FOOD WOULD RUN OUT BEFORE YOU GOT MONEY TO BUY MORE.: NEVER TRUE

## 2021-09-03 ASSESSMENT — ENCOUNTER SYMPTOMS
PHOTOPHOBIA: 0
BACK PAIN: 0
EYE REDNESS: 0
VOICE CHANGE: 0
CHOKING: 0
ABDOMINAL DISTENTION: 0
CONSTIPATION: 0
EYE DISCHARGE: 0
SINUS PRESSURE: 0
DIARRHEA: 0
WHEEZING: 0
FACIAL SWELLING: 0
VOMITING: 0
EYE PAIN: 0
EYE ITCHING: 0
SORE THROAT: 0
NAUSEA: 0
ANAL BLEEDING: 0
TROUBLE SWALLOWING: 0
RHINORRHEA: 0
SHORTNESS OF BREATH: 0
COLOR CHANGE: 0
COUGH: 0
CHEST TIGHTNESS: 0
ABDOMINAL PAIN: 0
RECTAL PAIN: 0
BLOOD IN STOOL: 0

## 2021-09-03 ASSESSMENT — SOCIAL DETERMINANTS OF HEALTH (SDOH): HOW HARD IS IT FOR YOU TO PAY FOR THE VERY BASICS LIKE FOOD, HOUSING, MEDICAL CARE, AND HEATING?: NOT HARD AT ALL

## 2021-09-03 ASSESSMENT — PATIENT HEALTH QUESTIONNAIRE - PHQ9
SUM OF ALL RESPONSES TO PHQ QUESTIONS 1-9: 0
SUM OF ALL RESPONSES TO PHQ9 QUESTIONS 1 & 2: 0
SUM OF ALL RESPONSES TO PHQ QUESTIONS 1-9: 0
SUM OF ALL RESPONSES TO PHQ QUESTIONS 1-9: 0
2. FEELING DOWN, DEPRESSED OR HOPELESS: 0
1. LITTLE INTEREST OR PLEASURE IN DOING THINGS: 0

## 2021-09-03 NOTE — PROGRESS NOTES
Chief Complaint   Patient presents with    Annual Exam       HPI: Shanon Perez is a 54 y.o. female is here for physical exam.  Her functional status is good. She denies any history of falls. She has no concerns in regards to safety, hearing, or cognition. She does not see any other healthcare providers on a routine basis. She does have a history of overactive bladder. She is not sure if her current dose of medication is working. She thinks it helps some, but she is not 100% sure that it works effectively. She is agreeable to trying a 10 mg dose of oxybutynin. Her vitamin D level is within normal limits. Her cholesterol is 178.   She is doing well otherwise and has no major concerns or complaints    Routine screening is as follows:  Eye exam yearly  Flu vaccine up to date  Pap: Hysterectomy  Mammogram: History mastectomy  Colonoscopy 1019    Past Medical History:   Diagnosis Date    Cancer [facility])     breast    History of breast cancer     Left    History of uterine leiomyoma       Past Surgical History:   Procedure Laterality Date     SECTION      CHOLECYSTECTOMY, LAPAROSCOPIC N/A 2019    CHOLECYSTECTOMY LAPAROSCOPIC performed by [facility]cell [facility] at [facility] 1  2020    HYSTERECTOMY, TOTAL ABDOMINAL  2005    MASTECTOMY Left 2014    MASTECTOMY Right 2015      Social History     Socioeconomic History    Marital status:      Spouse name: None    Number of children: None    Years of education: None    Highest education level: None   Occupational History    None   Tobacco Use    Smoking status: Never Smoker    Smokeless tobacco: Never Used   Vaping Use    Vaping Use: Never assessed   Substance and Sexual Activity    Alcohol use: No     Alcohol/week: 0.0 standard drinks    Drug use: No    Sexual activity: None   Other Topics Concern    None   Social History Narrative    None     Social Determinants of Health     Financial Resource Strain: Low Risk  Difficulty of Paying Living Expenses: Not hard at all   Food Insecurity: No Food Insecurity    Worried About Running Out of Food in the Last Year: Never true    Ran Out of Food in the Last Year: Never true   Transportation Needs:     Lack of Transportation (Medical):  Lack of Transportation (Non-Medical):    Physical Activity:     Days of Exercise per Week:     Minutes of Exercise per Session:    Stress:     Feeling of Stress :    Social Connections:     Frequency of Communication with Friends and Family:     Frequency of Social Gatherings with Friends and Family:     Attends [employer] Services:     Active Member of Clubs or Organizations:     Attends Club or Organization Meetings:     Marital Status:    Intimate Partner Violence:     Fear of Current or Ex-Partner:     Emotionally Abused:     Physically Abused:     Sexually Abused:       Family History   Problem Relation Age of Onset    Cancer Maternal Grandmother     Ovarian Cancer Maternal Grandmother     Breast Cancer Maternal Aunt     Other Father         MVA        Current Outpatient Medications   Medication Sig Dispense Refill    oxybutynin (DITROPAN-XL) 10 MG extended release tablet Take 1 tablet by mouth daily 30 tablet 5    clobetasol (TEMOVATE) 0.05 % ointment Apply topically 2 times daily. 1 Tube 1    Multiple Vitamins-Minerals (PRESERVISION AREDS 2) CAPS Take by mouth daily      triamcinolone (ARISTOCORT) 0.5 % cream APPLY TO THE SKIN BID  3    calcium carbonate 600 MG TABS tablet Take 600 mg by mouth daily      Cholecalciferol (VITAMIN D3) 1000 units TABS Take 1,000 Units by mouth daily      meloxicam (MOBIC) 7.5 MG tablet TAKE 1 TABLET BY MOUTH DAILY AS NEEDED (Patient not taking: Reported on 9/3/2020) 30 tablet 0     No current facility-administered medications for this visit.         Patient Active Problem List   Diagnosis    Status post mastectomy    Ductal carcinoma in situ of left breast    Family history of colon cancer    Family history of polyps in the colon    Hx of colonic polyps    Neelam-Sara syndrome    Pseudophakia    Toxic maculopathy        Review of Systems   Constitutional: Negative for activity change, appetite change, chills, diaphoresis, fatigue, fever and unexpected weight change. HENT: Negative for congestion, ear pain, facial swelling, hearing loss, mouth sores, nosebleeds, postnasal drip, rhinorrhea, sinus pressure, sneezing, sore throat, tinnitus, trouble swallowing and voice change. Eyes: Negative for photophobia, pain, discharge, redness, itching and visual disturbance. Respiratory: Negative for cough, choking, chest tightness, shortness of breath and wheezing. Cardiovascular: Negative for chest pain, palpitations and leg swelling. Gastrointestinal: Negative for abdominal distention, abdominal pain, anal bleeding, blood in stool, constipation, diarrhea, nausea, rectal pain and vomiting. Endocrine: Negative for cold intolerance, heat intolerance, polydipsia, polyphagia and polyuria. Genitourinary: Positive for frequency and urgency. Negative for decreased urine volume, difficulty urinating, dysuria, flank pain and hematuria. Musculoskeletal: Negative for arthralgias, back pain, gait problem, joint swelling, myalgias, neck pain and neck stiffness. Skin: Negative for color change, pallor and rash. Allergic/Immunologic: Negative for environmental allergies and food allergies. Neurological: Negative for dizziness, tremors, syncope, weakness, light-headedness, numbness and headaches. Hematological: Negative for adenopathy. Does not bruise/bleed easily. Psychiatric/Behavioral: Negative for agitation, behavioral problems, confusion, decreased concentration, dysphoric mood, hallucinations, self-injury, sleep disturbance and suicidal ideas. The patient is not nervous/anxious and is not hyperactive.         /84   Pulse 61   Ht 5' 6.5\" (1.689 m)   Wt 176 lb (79.8 kg) SpO2 97%   BMI 27.98 kg/m²   Physical Exam  Constitutional:       General: She is not in acute distress. Appearance: Normal appearance. She is well-developed. She is not ill-appearing, toxic-appearing or diaphoretic. HENT:      Head: Normocephalic and atraumatic. Right Ear: Tympanic membrane, ear canal and external ear normal. There is no impacted cerumen. Left Ear: Tympanic membrane, ear canal and external ear normal. There is no impacted cerumen. Nose: Nose normal. No congestion or rhinorrhea. Mouth/Throat:      Mouth: Mucous membranes are moist.      Pharynx: Oropharynx is clear. No oropharyngeal exudate or posterior oropharyngeal erythema. Eyes:      General: No scleral icterus. Right eye: No discharge. Left eye: No discharge. Extraocular Movements: Extraocular movements intact. Conjunctiva/sclera: Conjunctivae normal.      Pupils: Pupils are equal, round, and reactive to light. Neck:      Thyroid: No thyromegaly. Vascular: No carotid bruit or JVD. Trachea: No tracheal deviation. Cardiovascular:      Rate and Rhythm: Normal rate and regular rhythm. Pulses: Normal pulses. Heart sounds: Normal heart sounds. No murmur heard. No friction rub. No gallop. Pulmonary:      Effort: Pulmonary effort is normal. No respiratory distress. Breath sounds: Normal breath sounds. No stridor. No wheezing, rhonchi or rales. Chest:      Chest wall: No tenderness. Abdominal:      General: Bowel sounds are normal. There is no distension. Palpations: Abdomen is soft. There is no mass. Tenderness: There is no abdominal tenderness. There is no right CVA tenderness, left CVA tenderness, guarding or rebound. Hernia: No hernia is present. Musculoskeletal:         General: No swelling, tenderness, deformity or signs of injury. Normal range of motion. Cervical back: Normal range of motion and neck supple. No rigidity.  No muscular tenderness. Right lower leg: No edema. Left lower leg: No edema. Lymphadenopathy:      Cervical: No cervical adenopathy. Skin:     General: Skin is warm and dry. Coloration: Skin is not jaundiced or pale. Findings: No bruising, erythema, lesion or rash. Neurological:      General: No focal deficit present. Mental Status: She is alert and oriented to person, place, and time. Mental status is at baseline. Cranial Nerves: No cranial nerve deficit. Motor: No weakness or abnormal muscle tone. Coordination: Coordination normal.      Gait: Gait normal.      Deep Tendon Reflexes: Reflexes are normal and symmetric. Reflexes normal.   Psychiatric:         Mood and Affect: Mood normal.         Behavior: Behavior normal.         Thought Content: Thought content normal.         Judgment: Judgment normal.         1. Routine adult health maintenance    2. Vitamin D deficiency    3. Screening for osteoporosis    4. Ductal carcinoma in situ of left breast    5. OAB (overactive bladder)        ASSESSMENT/PLAN:    41-year-old woman here for her annual physical exam    1. Health maintenance: Routine screening is as per HPI. Labs discussed with patient today    2. Overactive bladder: Increase oxybutynin to 10 mg p.o. daily    3. Vitamin D deficiency: Continue cholecalciferol    4. History of breast cancer status post chemotherapy: Check bone density. David was seen today for annual exam.    Diagnoses and all orders for this visit:    Routine adult health maintenance  -     CBC Auto Differential; Future  -     Comprehensive Metabolic Panel; Future  -     Lipid Panel; Future  -     TSH without Reflex; Future    Vitamin D deficiency  -     Vitamin D 25 Hydroxy; Future    Screening for osteoporosis  -     DEXA BONE DENSITY 2 SITES;  Future    Ductal carcinoma in situ of left breast    OAB (overactive bladder)    Other orders  -     oxybutynin (DITROPAN-XL) 10 MG extended release tablet; Take 1 tablet by mouth daily          Return in about 1 year (around 9/3/2022).      Orders Placed This Encounter   Procedures    DEXA BONE DENSITY 2 SITES     Standing Status:   Future     Standing Expiration Date:   9/3/2022     Order Specific Question:   Reason for exam:     Answer:   screening    CBC Auto Differential     Fast 12 hours     Standing Status:   Future     Standing Expiration Date:   9/3/2022    Comprehensive Metabolic Panel     Fasting 12 hours     Standing Status:   Future     Standing Expiration Date:   9/3/2022    Lipid Panel     Standing Status:   Future     Standing Expiration Date:   9/3/2022     Order Specific Question:   Is Patient Fasting?/# of Hours     Answer:   12    TSH without Reflex     Fast 12 hours     Standing Status:   Future     Standing Expiration Date:   9/3/2022    Vitamin D 25 Hydroxy     Standing Status:   Future     Standing Expiration Date:   9/3/2022       Ben Diaz MD

## 2021-09-07 ENCOUNTER — PATIENT MESSAGE (OUTPATIENT)
Dept: INTERNAL MEDICINE | Age: 56
End: 2021-09-07

## 2021-09-07 DIAGNOSIS — L23.7 POISON IVY: Primary | ICD-10-CM

## 2021-09-07 RX ORDER — METHYLPREDNISOLONE 4 MG/1
TABLET ORAL
Qty: 1 KIT | Refills: 0 | Status: SHIPPED | OUTPATIENT
Start: 2021-09-07 | End: 2021-09-13

## 2021-09-22 ENCOUNTER — HOSPITAL ENCOUNTER (OUTPATIENT)
Dept: WOMENS IMAGING | Age: 56
Discharge: HOME | End: 2021-09-22
Payer: MEDICAID

## 2021-09-22 DIAGNOSIS — D05.12 DUCTAL CARCINOMA IN SITU OF LEFT BREAST: Primary | ICD-10-CM

## 2021-09-22 DIAGNOSIS — Z13.820 SCREENING FOR OSTEOPOROSIS: ICD-10-CM

## 2021-09-22 PROCEDURE — 77080 DXA BONE DENSITY AXIAL: CPT

## 2021-09-29 ENCOUNTER — LAB (OUTPATIENT)
Dept: LAB | Facility: HOSPITAL | Age: 56
End: 2021-09-29

## 2021-09-29 ENCOUNTER — OFFICE VISIT (OUTPATIENT)
Dept: ONCOLOGY | Facility: CLINIC | Age: 56
End: 2021-09-29

## 2021-09-29 VITALS
OXYGEN SATURATION: 97 % | RESPIRATION RATE: 16 BRPM | DIASTOLIC BLOOD PRESSURE: 76 MMHG | HEART RATE: 76 BPM | TEMPERATURE: 98 F | HEIGHT: 66 IN | BODY MASS INDEX: 29.73 KG/M2 | WEIGHT: 185 LBS | SYSTOLIC BLOOD PRESSURE: 126 MMHG

## 2021-09-29 DIAGNOSIS — H59.039 IRVINE-GASS SYNDROME: ICD-10-CM

## 2021-09-29 DIAGNOSIS — D05.12 DUCTAL CARCINOMA IN SITU OF LEFT BREAST: Primary | ICD-10-CM

## 2021-09-29 DIAGNOSIS — D05.12 DUCTAL CARCINOMA IN SITU OF LEFT BREAST: ICD-10-CM

## 2021-09-29 DIAGNOSIS — R79.89 ELEVATED LFTS: Primary | ICD-10-CM

## 2021-09-29 LAB
ALBUMIN SERPL-MCNC: 4 G/DL (ref 3.5–5.2)
ALBUMIN/GLOB SERPL: 1.5 G/DL
ALP SERPL-CCNC: 122 U/L (ref 39–117)
ALT SERPL W P-5'-P-CCNC: 37 U/L (ref 1–33)
ANION GAP SERPL CALCULATED.3IONS-SCNC: 6 MMOL/L (ref 5–15)
AST SERPL-CCNC: 31 U/L (ref 1–32)
BASOPHILS # BLD AUTO: 0.04 10*3/MM3 (ref 0–0.2)
BASOPHILS NFR BLD AUTO: 0.6 % (ref 0–1.5)
BILIRUB SERPL-MCNC: 0.3 MG/DL (ref 0–1.2)
BUN SERPL-MCNC: 10 MG/DL (ref 6–20)
BUN/CREAT SERPL: 15.4 (ref 7–25)
CALCIUM SPEC-SCNC: 8.6 MG/DL (ref 8.6–10.5)
CHLORIDE SERPL-SCNC: 107 MMOL/L (ref 98–107)
CO2 SERPL-SCNC: 30 MMOL/L (ref 22–29)
CREAT SERPL-MCNC: 0.65 MG/DL (ref 0.57–1)
DEPRECATED RDW RBC AUTO: 41.4 FL (ref 37–54)
EOSINOPHIL # BLD AUTO: 0.23 10*3/MM3 (ref 0–0.4)
EOSINOPHIL NFR BLD AUTO: 3.6 % (ref 0.3–6.2)
ERYTHROCYTE [DISTWIDTH] IN BLOOD BY AUTOMATED COUNT: 12.6 % (ref 12.3–15.4)
GFR SERPL CREATININE-BSD FRML MDRD: 95 ML/MIN/1.73
GLOBULIN UR ELPH-MCNC: 2.6 GM/DL
GLUCOSE SERPL-MCNC: 89 MG/DL (ref 65–99)
HCT VFR BLD AUTO: 42.4 % (ref 34–46.6)
HGB BLD-MCNC: 13.6 G/DL (ref 12–15.9)
HOLD SPECIMEN: NORMAL
IMM GRANULOCYTES # BLD AUTO: 0.02 10*3/MM3 (ref 0–0.05)
IMM GRANULOCYTES NFR BLD AUTO: 0.3 % (ref 0–0.5)
LYMPHOCYTES # BLD AUTO: 1.68 10*3/MM3 (ref 0.7–3.1)
LYMPHOCYTES NFR BLD AUTO: 26 % (ref 19.6–45.3)
MCH RBC QN AUTO: 29.2 PG (ref 26.6–33)
MCHC RBC AUTO-ENTMCNC: 32.1 G/DL (ref 31.5–35.7)
MCV RBC AUTO: 91.2 FL (ref 79–97)
MONOCYTES # BLD AUTO: 0.49 10*3/MM3 (ref 0.1–0.9)
MONOCYTES NFR BLD AUTO: 7.6 % (ref 5–12)
NEUTROPHILS NFR BLD AUTO: 4 10*3/MM3 (ref 1.7–7)
NEUTROPHILS NFR BLD AUTO: 61.9 % (ref 42.7–76)
NRBC BLD AUTO-RTO: 0 /100 WBC (ref 0–0.2)
PLATELET # BLD AUTO: 241 10*3/MM3 (ref 140–450)
PMV BLD AUTO: 9.3 FL (ref 6–12)
POTASSIUM SERPL-SCNC: 3.9 MMOL/L (ref 3.5–5.2)
PROT SERPL-MCNC: 6.6 G/DL (ref 6–8.5)
RBC # BLD AUTO: 4.65 10*6/MM3 (ref 3.77–5.28)
SODIUM SERPL-SCNC: 143 MMOL/L (ref 136–145)
WBC # BLD AUTO: 6.46 10*3/MM3 (ref 3.4–10.8)

## 2021-09-29 PROCEDURE — 80053 COMPREHEN METABOLIC PANEL: CPT

## 2021-09-29 PROCEDURE — 99214 OFFICE O/P EST MOD 30 MIN: CPT | Performed by: NURSE PRACTITIONER

## 2021-09-29 PROCEDURE — 85025 COMPLETE CBC W/AUTO DIFF WBC: CPT

## 2021-09-29 PROCEDURE — 36415 COLL VENOUS BLD VENIPUNCTURE: CPT

## 2021-10-04 ENCOUNTER — HOSPITAL ENCOUNTER (OUTPATIENT)
Dept: ULTRASOUND IMAGING | Facility: HOSPITAL | Age: 56
Discharge: HOME | End: 2021-10-04
Admitting: NURSE PRACTITIONER

## 2021-10-04 DIAGNOSIS — R79.89 ELEVATED LFTS: ICD-10-CM

## 2021-10-04 PROCEDURE — 76705 ECHO EXAM OF ABDOMEN: CPT

## 2021-10-05 ENCOUNTER — TELEPHONE (OUTPATIENT)
Dept: ONCOLOGY | Facility: CLINIC | Age: 56
End: 2021-10-05

## 2021-10-05 DIAGNOSIS — N13.30 HYDRONEPHROSIS OF RIGHT KIDNEY: Primary | ICD-10-CM

## 2021-10-05 NOTE — TELEPHONE ENCOUNTER
Called Madhav to let her know about her test results and spoke with Madhav's  and he told me [facility] CT Scan and  said that Madhav agreed. Madhav's  v/u. 10/05/2021

## 2021-10-12 ENCOUNTER — HOSPITAL ENCOUNTER (OUTPATIENT)
Dept: CT IMAGING | Facility: HOSPITAL | Age: 56
Discharge: HOME | End: 2021-10-12
Admitting: NURSE PRACTITIONER

## 2021-10-12 DIAGNOSIS — N13.30 HYDRONEPHROSIS OF RIGHT KIDNEY: ICD-10-CM

## 2021-10-12 LAB — CREAT BLDA-MCNC: 0.7 MG/DL (ref 0.6–1.3)

## 2021-10-12 PROCEDURE — 74177 CT ABD & PELVIS W/CONTRAST: CPT

## 2021-10-12 PROCEDURE — 25010000002 IOPAMIDOL 61 % SOLUTION: Performed by: NURSE PRACTITIONER

## 2021-10-12 PROCEDURE — 82565 ASSAY OF CREATININE: CPT

## 2021-10-12 RX ADMIN — IOPAMIDOL 100 ML: 612 INJECTION, SOLUTION INTRAVENOUS at 12:34

## 2021-10-12 RX ADMIN — IOPAMIDOL 50 ML: 612 INJECTION, SOLUTION INTRAVENOUS at 12:34

## 2021-10-13 ENCOUNTER — TELEPHONE (OUTPATIENT)
Dept: ONCOLOGY | Facility: CLINIC | Age: 56
End: 2021-10-13

## 2021-10-13 DIAGNOSIS — R93.2 ABNORMAL CT OF LIVER: Primary | ICD-10-CM

## 2021-10-13 NOTE — TELEPHONE ENCOUNTER
CALLED TEJAS TO ADVISE HER THAT WANTED TO ORDER AN MRI OF HER ABDOMEN AND TEJAS WAS OK WITH HAVING IT DONE. ORDER HAS BEEN PUT IN AND SIGNED. PT V/U. 10/13/2021 BS

## 2021-10-26 ENCOUNTER — HOSPITAL ENCOUNTER (OUTPATIENT)
Dept: MRI IMAGING | Facility: HOSPITAL | Age: 56
Discharge: HOME | End: 2021-10-26
Admitting: NURSE PRACTITIONER

## 2021-10-26 DIAGNOSIS — R93.2 ABNORMAL CT OF LIVER: ICD-10-CM

## 2021-10-26 PROCEDURE — 0 GADOBENATE DIMEGLUMINE 529 MG/ML SOLUTION: Performed by: NURSE PRACTITIONER

## 2021-10-26 PROCEDURE — A9577 INJ MULTIHANCE: HCPCS | Performed by: NURSE PRACTITIONER

## 2021-10-26 PROCEDURE — 74183 MRI ABD W/O CNTR FLWD CNTR: CPT

## 2021-10-26 RX ADMIN — GADOBENATE DIMEGLUMINE 16 ML: 529 INJECTION, SOLUTION INTRAVENOUS at 12:55

## 2021-11-01 ENCOUNTER — TELEPHONE (OUTPATIENT)
Dept: ONCOLOGY | Facility: CLINIC | Age: 56
End: 2021-11-01

## 2021-11-01 NOTE — TELEPHONE ENCOUNTER
Notified pt of normal scan she v/u      ----- Message from VIC Khalil sent at 11/1/2021  9:03 AM CDT -----  Please advise Ms. Tony that her MRI was unremarkable.  She has a small hemangioma in the right side of her liver that is correlating with the lesion that was of concern on the CT.  Hemangioma is a benign process.

## 2021-11-04 ENCOUNTER — TELEPHONE (OUTPATIENT)
Dept: ONCOLOGY | Facility: CLINIC | Age: 56
End: 2021-11-04

## 2021-11-04 NOTE — TELEPHONE ENCOUNTER
Caller: Madhav Tony    Relationship: Self    Best call back number: [phone]    What is the best time to reach you: ANY    Who are you requesting to speak with (clinical staff, provider,  specific staff member): CLINICAL    What was the call regarding: PATIENT CALLED WANTED TO MAKE SURE HER RECORDS WERE ALSO GOING TO HER PCP [facility]. PATIENT WAS WORRIED SHE DIDN'T HAVE HER LAST OFFICE NOTES OR CT RESULTS    PLEASE FAX TO [facility] @ [phone]

## 2021-11-09 ENCOUNTER — TELEPHONE (OUTPATIENT)
Dept: ONCOLOGY | Facility: CLINIC | Age: 56
End: 2021-11-09

## 2021-11-17 NOTE — TELEPHONE ENCOUNTER
Caller: Madhav Tony    Relationship: Self    Best call back number: [phone]    What was the call regarding: MADHAV CALLED FOR AN UPDATE ON THIS REQUEST.    Do you require a callback: YES

## 2021-11-17 NOTE — TELEPHONE ENCOUNTER
NOTIIFED TEJAS THAT RECORDS WERE SENT TO [facility] ON NOV. 9TH BUT I WILL SEND THEM AGAIN TODAY. SHE V/U

## 2021-11-18 ENCOUNTER — TELEPHONE (OUTPATIENT)
Dept: INTERNAL MEDICINE | Age: 56
End: 2021-11-18

## 2021-11-18 NOTE — TELEPHONE ENCOUNTER
Pt called asking if we received the workup that the cancer center did. This [facility] told her that we are able to see the things she has done at [facility] is asking that Dr. Betty Maya review everything, especially the test ran that shows she has a cyst on her kidney. Pt does not see the doctor again for a year and just wanted to make sure Dr. Betty Maya could review everything for her.

## 2021-11-24 DIAGNOSIS — N32.81 OAB (OVERACTIVE BLADDER): Primary | ICD-10-CM

## 2021-11-24 RX ORDER — OXYBUTYNIN CHLORIDE 5 MG/1
5 TABLET, EXTENDED RELEASE ORAL DAILY
Qty: 30 TABLET | Refills: 3 | Status: SHIPPED | OUTPATIENT
Start: 2021-11-24 | End: 2022-09-07

## 2021-11-24 NOTE — TELEPHONE ENCOUNTER
Javier Hendrix called to request a refill on her medication.       Last office visit : 9/3/2021   Next office visit : 9/7/2022    Requested Prescriptions     Pending Prescriptions Disp Refills    oxybutynin (DITROPAN-XL) 5 MG extended release tablet [Pharmacy Med Name: OXYBUTYNIN ER 5MG TABLETS] 30 tablet 3     Sig: TAKE 1 TABLET BY [address] [place] [address]

## 2022-01-10 ENCOUNTER — TELEPHONE (OUTPATIENT)
Dept: INTERNAL MEDICINE | Age: 57
End: 2022-01-10

## 2022-01-10 DIAGNOSIS — J02.9 SORE THROAT: Primary | ICD-10-CM

## 2022-01-10 RX ORDER — DOXYCYCLINE HYCLATE 100 MG
100 TABLET ORAL 2 TIMES DAILY
Qty: 20 TABLET | Refills: 0 | Status: SHIPPED | OUTPATIENT
Start: 2022-01-10 | End: 2022-01-20

## 2022-04-06 NOTE — TELEPHONE ENCOUNTER
----- Message from Johnie Roger sent at 4/6/2022  8:35 AM CDT -----  Subject: Message to Provider    QUESTIONS  Information for Provider? Needs to ask Malika Stone needs 7-10 natasha packs   ---------------------------------------------------------------------------  --------------  CALL BACK INFO  What is the best way for the office to contact you? OK to leave message on   voicemail  Preferred Call Back Phone Number? [phone]  ---------------------------------------------------------------------------  --------------  SCRIPT ANSWERS  Relationship to Patient?  Self

## 2022-04-07 RX ORDER — METHYLPREDNISOLONE 4 MG/1
TABLET ORAL
Qty: 1 KIT | Refills: 0 | Status: SHIPPED | OUTPATIENT
Start: 2022-04-07 | End: 2022-04-13

## 2022-04-07 NOTE — TELEPHONE ENCOUNTER
Pt returned our call, she stated she is needing a 7 day steroid pack because her lower back on the right side is hurting so bad. She needs this to hold her over until she can get into the chiropractor next week.

## 2022-07-25 ENCOUNTER — PATIENT MESSAGE (OUTPATIENT)
Dept: INTERNAL MEDICINE | Age: 57
End: 2022-07-25

## 2022-07-25 ENCOUNTER — TELEPHONE (OUTPATIENT)
Dept: INTERNAL MEDICINE | Age: 57
End: 2022-07-25

## 2022-07-25 DIAGNOSIS — R09.81 NASAL CONGESTION: Primary | ICD-10-CM

## 2022-07-25 DIAGNOSIS — R05.9 COUGH: Primary | ICD-10-CM

## 2022-07-25 RX ORDER — BENZONATATE 200 MG/1
200 CAPSULE ORAL 3 TIMES DAILY PRN
Qty: 30 CAPSULE | Refills: 0 | Status: SHIPPED | OUTPATIENT
Start: 2022-07-25 | End: 2022-08-01

## 2022-07-25 RX ORDER — AZITHROMYCIN 250 MG/1
250 TABLET, FILM COATED ORAL SEE ADMIN INSTRUCTIONS
Qty: 6 TABLET | Refills: 0 | Status: SHIPPED | OUTPATIENT
Start: 2022-07-25 | End: 2022-07-30

## 2022-07-25 NOTE — TELEPHONE ENCOUNTER
----- Message from Fadi Escalante sent at 7/25/2022  8:34 AM CDT -----  Subject: Message to Provider    QUESTIONS  Information for Provider? Patient is coughing , with congestion, feeling   hoarse, tested negative for Covid. Can you please call her in a Zpack or   antibiotic. Please advise  ---------------------------------------------------------------------------  --------------  Venessa RODRIGUEZ  [phone]; OK to leave message on voicemail  ---------------------------------------------------------------------------  --------------  SCRIPT ANSWERS  Relationship to Patient?  Self

## 2022-07-25 NOTE — TELEPHONE ENCOUNTER
From: Astrid Lopez  To: Dr. De Souza [facility]: 7/25/2022 2:43 PM CDT  Subject: Cough Medicine    Good afternoon, I messaged earlier about needing a z pack and some cough syrup. I received the z pack but no cough syrup. I need something called in to help with my productive cough. I have already been using Robitussin and it's not working. I would appreciate it. Thank you.

## 2022-07-25 NOTE — TELEPHONE ENCOUNTER
From: Dee Radford  To: Dr. Tyson Wesley: 2022 9:40 AM CDT  Subject: New Prescription    Good morning Dr. Dayna Miller,    I called in because I couldn't login to my MyChart and I finally got in. I wanted to reach out and see if I could get a z pack with some cough syrup called in for me. I have some nasal congestion with a productive cough. I have been using some cough syrup but it is not working - Robitussin I have been using. I would appreciate your help! Thank you!

## 2022-07-28 RX ORDER — METHYLPREDNISOLONE 4 MG/1
TABLET ORAL
Qty: 1 KIT | Refills: 0 | Status: SHIPPED | OUTPATIENT
Start: 2022-07-28 | End: 2022-08-03

## 2022-08-03 RX ORDER — DOXYCYCLINE HYCLATE 100 MG
100 TABLET ORAL 2 TIMES DAILY
Qty: 20 TABLET | Refills: 0 | Status: SHIPPED | OUTPATIENT
Start: 2022-08-03 | End: 2022-08-13

## 2022-09-06 ENCOUNTER — TELEPHONE (OUTPATIENT)
Dept: INTERNAL MEDICINE | Age: 57
End: 2022-09-06

## 2022-09-06 DIAGNOSIS — Z96.1 PSEUDOPHAKIA: ICD-10-CM

## 2022-09-06 DIAGNOSIS — Z00.00 ROUTINE ADULT HEALTH MAINTENANCE: ICD-10-CM

## 2022-09-06 DIAGNOSIS — Z13.220 SCREENING FOR LIPOID DISORDERS: ICD-10-CM

## 2022-09-06 DIAGNOSIS — Z13.29 SCREENING FOR THYROID DISORDER: ICD-10-CM

## 2022-09-06 DIAGNOSIS — E55.9 VITAMIN D DEFICIENCY: Primary | ICD-10-CM

## 2022-09-07 ENCOUNTER — PATIENT MESSAGE (OUTPATIENT)
Dept: INTERNAL MEDICINE | Age: 57
End: 2022-09-07

## 2022-09-07 ENCOUNTER — HOSPITAL ENCOUNTER (OUTPATIENT)
Dept: GENERAL RADIOLOGY | Age: 57
Discharge: HOME | End: 2022-09-07
Payer: MEDICAID

## 2022-09-07 ENCOUNTER — OFFICE VISIT (OUTPATIENT)
Dept: INTERNAL MEDICINE | Age: 57
End: 2022-09-07
Payer: MEDICAID

## 2022-09-07 ENCOUNTER — TELEPHONE (OUTPATIENT)
Dept: INTERNAL MEDICINE | Age: 57
End: 2022-09-07

## 2022-09-07 VITALS
SYSTOLIC BLOOD PRESSURE: 126 MMHG | HEIGHT: 63 IN | HEART RATE: 75 BPM | WEIGHT: 253.8 LBS | BODY MASS INDEX: 44.97 KG/M2 | DIASTOLIC BLOOD PRESSURE: 76 MMHG | OXYGEN SATURATION: 98 %

## 2022-09-07 DIAGNOSIS — E55.9 VITAMIN D DEFICIENCY: ICD-10-CM

## 2022-09-07 DIAGNOSIS — R74.8 ELEVATED LIVER ENZYMES: Primary | ICD-10-CM

## 2022-09-07 DIAGNOSIS — G89.29 CHRONIC RIGHT-SIDED LOW BACK PAIN WITH RIGHT-SIDED SCIATICA: ICD-10-CM

## 2022-09-07 DIAGNOSIS — Z13.220 SCREENING FOR LIPOID DISORDERS: ICD-10-CM

## 2022-09-07 DIAGNOSIS — R74.8 ELEVATED LIVER ENZYMES: ICD-10-CM

## 2022-09-07 DIAGNOSIS — Z13.29 SCREENING FOR THYROID DISORDER: ICD-10-CM

## 2022-09-07 DIAGNOSIS — Z85.3 HISTORY OF BREAST CANCER: ICD-10-CM

## 2022-09-07 DIAGNOSIS — Z86.010 HISTORY OF COLON POLYPS: ICD-10-CM

## 2022-09-07 DIAGNOSIS — Z96.1 PSEUDOPHAKIA: ICD-10-CM

## 2022-09-07 DIAGNOSIS — M54.41 CHRONIC RIGHT-SIDED LOW BACK PAIN WITH RIGHT-SIDED SCIATICA: ICD-10-CM

## 2022-09-07 DIAGNOSIS — Z00.00 ROUTINE ADULT HEALTH MAINTENANCE: Primary | ICD-10-CM

## 2022-09-07 DIAGNOSIS — Z00.00 ROUTINE ADULT HEALTH MAINTENANCE: ICD-10-CM

## 2022-09-07 LAB
ALBUMIN SERPL-MCNC: 4 G/DL (ref 3.5–5.2)
ALP BLD-CCNC: 177 U/L (ref 35–104)
ALT SERPL-CCNC: 50 U/L (ref 5–33)
ANION GAP SERPL CALCULATED.3IONS-SCNC: 12 MMOL/L (ref 7–19)
AST SERPL-CCNC: 39 U/L (ref 5–32)
BASOPHILS ABSOLUTE: 0.1 K/UL (ref 0–0.2)
BASOPHILS RELATIVE PERCENT: 0.6 % (ref 0–1)
BILIRUB SERPL-MCNC: 0.4 MG/DL (ref 0.2–1.2)
BUN BLDV-MCNC: 17 MG/DL (ref 6–20)
CALCIUM SERPL-MCNC: 9.2 MG/DL (ref 8.6–10)
CHLORIDE BLD-SCNC: 101 MMOL/L (ref 98–111)
CHOLESTEROL, TOTAL: 187 MG/DL (ref 160–199)
CO2: 28 MMOL/L (ref 22–29)
CREAT SERPL-MCNC: 0.9 MG/DL (ref 0.5–0.9)
EOSINOPHILS ABSOLUTE: 0.2 K/UL (ref 0–0.6)
EOSINOPHILS RELATIVE PERCENT: 2.2 % (ref 0–5)
GFR AFRICAN AMERICAN: >59
GFR NON-AFRICAN AMERICAN: >60
GLUCOSE BLD-MCNC: 112 MG/DL (ref 74–109)
HCT VFR BLD CALC: 44.3 % (ref 37–47)
HDLC SERPL-MCNC: 52 MG/DL (ref 65–121)
HEMOGLOBIN: 14.1 G/DL (ref 12–16)
IMMATURE GRANULOCYTES #: 0 K/UL
LDL CHOLESTEROL CALCULATED: 114 MG/DL
LYMPHOCYTES ABSOLUTE: 1.7 K/UL (ref 1.1–4.5)
LYMPHOCYTES RELATIVE PERCENT: 19.5 % (ref 20–40)
MCH RBC QN AUTO: 28.6 PG (ref 27–31)
MCHC RBC AUTO-ENTMCNC: 31.8 G/DL (ref 33–37)
MCV RBC AUTO: 89.9 FL (ref 81–99)
MONOCYTES ABSOLUTE: 0.6 K/UL (ref 0–0.9)
MONOCYTES RELATIVE PERCENT: 6.3 % (ref 0–10)
NEUTROPHILS ABSOLUTE: 6.2 K/UL (ref 1.5–7.5)
NEUTROPHILS RELATIVE PERCENT: 70.9 % (ref 50–65)
PDW BLD-RTO: 14.7 % (ref 11.5–14.5)
PLATELET # BLD: 283 K/UL (ref 130–400)
PMV BLD AUTO: 9.8 FL (ref 9.4–12.3)
POTASSIUM SERPL-SCNC: 4.4 MMOL/L (ref 3.5–5)
RBC # BLD: 4.93 M/UL (ref 4.2–5.4)
SODIUM BLD-SCNC: 141 MMOL/L (ref 136–145)
TOTAL PROTEIN: 6.6 G/DL (ref 6.6–8.7)
TRIGL SERPL-MCNC: 106 MG/DL (ref 0–149)
TSH SERPL DL<=0.05 MIU/L-ACNC: 1.47 UIU/ML (ref 0.27–4.2)
VITAMIN D 25-HYDROXY: 45.9 NG/ML
WBC # BLD: 8.8 K/UL (ref 4.8–10.8)

## 2022-09-07 PROCEDURE — 72110 X-RAY EXAM L-2 SPINE 4/>VWS: CPT

## 2022-09-07 PROCEDURE — 99396 PREV VISIT EST AGE 40-64: CPT | Performed by: INTERNAL MEDICINE

## 2022-09-07 PROCEDURE — 72110 X-RAY EXAM L-2 SPINE 4/>VWS: CPT | Performed by: RADIOLOGY

## 2022-09-07 RX ORDER — METHYLPREDNISOLONE 4 MG/1
TABLET ORAL
Qty: 1 KIT | Refills: 0 | Status: SHIPPED | OUTPATIENT
Start: 2022-09-07 | End: 2022-09-13

## 2022-09-07 SDOH — ECONOMIC STABILITY: FOOD INSECURITY: WITHIN THE PAST 12 MONTHS, YOU WORRIED THAT YOUR FOOD WOULD RUN OUT BEFORE YOU GOT MONEY TO BUY MORE.: NEVER TRUE

## 2022-09-07 SDOH — ECONOMIC STABILITY: FOOD INSECURITY: WITHIN THE PAST 12 MONTHS, THE FOOD YOU BOUGHT JUST DIDN'T LAST AND YOU DIDN'T HAVE MONEY TO GET MORE.: NEVER TRUE

## 2022-09-07 ASSESSMENT — PATIENT HEALTH QUESTIONNAIRE - PHQ9
SUM OF ALL RESPONSES TO PHQ QUESTIONS 1-9: 0
SUM OF ALL RESPONSES TO PHQ QUESTIONS 1-9: 0
SUM OF ALL RESPONSES TO PHQ9 QUESTIONS 1 & 2: 0
1. LITTLE INTEREST OR PLEASURE IN DOING THINGS: 0
SUM OF ALL RESPONSES TO PHQ QUESTIONS 1-9: 0
2. FEELING DOWN, DEPRESSED OR HOPELESS: 0
SUM OF ALL RESPONSES TO PHQ QUESTIONS 1-9: 0

## 2022-09-07 ASSESSMENT — SOCIAL DETERMINANTS OF HEALTH (SDOH): HOW HARD IS IT FOR YOU TO PAY FOR THE VERY BASICS LIKE FOOD, HOUSING, MEDICAL CARE, AND HEATING?: NOT HARD AT ALL

## 2022-09-07 NOTE — TELEPHONE ENCOUNTER
----- Message from Jocelyn Goodson MD sent at 9/7/2022 10:41 AM CDT -----  Vitamin D is normal.  Cholesterol is 187. Liver enzymes are slightly elevated. Lets get hepatic ultrasound.   CBC is normal

## 2022-09-07 NOTE — PROGRESS NOTES
Chief Complaint   Patient presents with    Annual Exam       HPI: Ankush Dennis is a 64 y.o. female is here for annual wellness exam.  Her labs are currently pending. She does see Dr. Munira Beasley for history of breast cancer. She denies any concerns in regards to safety, hearing, or cognition. She has been having some more back pain. Her weight is up. Her labs are currently pending. She states that her back hurts when she stands for long periods of time. Sometimes, the pain runs down the right lower extremity. She is agreeable to getting a lumbar spine x-ray. She is also agreeable to trying a Medrol Dosepak to see if that will help. She does have a history of vitamin D deficiency and is taking cholecalciferol. She has been under some stress secondary to her parents health and COVID-19. She is due for colonoscopy.   We can refer her to Dr. Dennise Fisher    Routine screening is as follows:  Eye exam yearly  Flu vaccine advised yearly  Pap: Hysterectomy  Mammogram: history of Mastectomy  Colonoscopy: Due, referral made  DEXAURA 2021    Past Medical History:   Diagnosis Date    Cancer [facility])     breast    History of breast cancer     Left    History of uterine leiomyoma       Past Surgical History:   Procedure Laterality Date    [address] N/A 9/6/2019    CHOLECYSTECTOMY LAPAROSCOPIC performed by Alyssa Rankin MD at [place] [address]  03/2020    HYSTERECTOMY, TOTAL ABDOMINAL (CERVIX REMOVED)  2005    MASTECTOMY Left 2014    MASTECTOMY Right 2015      Social History     Socioeconomic History    Marital status:      Spouse name: None    Number of children: None    Years of education: None    Highest education level: None   Tobacco Use    Smoking status: Never    Smokeless tobacco: Never   Substance and Sexual Activity    Alcohol use: No     Alcohol/week: 0.0 standard drinks    Drug use: No     Social Determinants of Health     Financial Resource Strain: Low Risk     Difficulty of Paying Living Expenses: Not hard at all   Food Insecurity: No Food Insecurity    Worried About Running Out of Food in the Last Year: Never true    Ran Out of Food in the Last Year: Never true      Family History   Problem Relation Age of Onset    Brain Cancer Mother     Other Father         MVA    Cancer Maternal Grandmother     Ovarian Cancer Maternal Grandmother     Breast Cancer Maternal Aunt         Current Outpatient Medications   Medication Sig Dispense Refill    Probiotic Product (ACIDOPHILUS/BIFIDUS PROBIOTIC PO) Take by mouth      methylPREDNISolone (MEDROL DOSEPACK) 4 MG tablet Take by mouth. 1 kit 0    clobetasol (TEMOVATE) 0.05 % ointment Apply topically 2 times daily. 1 Tube 1    Multiple Vitamins-Minerals (PRESERVISION AREDS 2) CAPS Take by mouth daily      triamcinolone (ARISTOCORT) 0.5 % cream APPLY TO THE SKIN BID  3    calcium carbonate 600 MG TABS tablet Take 600 mg by mouth daily      Cholecalciferol (VITAMIN D3) 1000 units TABS Take 1,000 Units by mouth daily       No current facility-administered medications for this visit. Patient Active Problem List   Diagnosis    Status post mastectomy    Ductal carcinoma in situ of left breast    Family history of colon cancer    Family history of polyps in the colon    Hx of colonic polyps    Carter-Sara syndrome    Pseudophakia    Toxic maculopathy        Review of Systems   Constitutional:  Negative for activity change, appetite change, chills, diaphoresis, fatigue, fever and unexpected weight change. HENT:  Negative for congestion, ear pain, facial swelling, hearing loss, mouth sores, nosebleeds, postnasal drip, rhinorrhea, sinus pressure, sneezing, sore throat, tinnitus, trouble swallowing and voice change. Eyes:  Negative for photophobia, pain, discharge, redness, itching and visual disturbance. Respiratory:  Negative for cough, choking, chest tightness, shortness of breath and wheezing.     Cardiovascular: Negative for chest pain, palpitations and leg swelling. Gastrointestinal:  Negative for abdominal distention, abdominal pain, anal bleeding, blood in stool, constipation, diarrhea, nausea, rectal pain and vomiting. Endocrine: Negative for cold intolerance, heat intolerance, polydipsia, polyphagia and polyuria. Genitourinary:  Negative for decreased urine volume, difficulty urinating, dysuria, flank pain, frequency, hematuria and urgency. Musculoskeletal:  Positive for back pain. Negative for arthralgias, gait problem, joint swelling, myalgias, neck pain and neck stiffness. Skin:  Negative for color change, pallor and rash. Allergic/Immunologic: Negative for environmental allergies and food allergies. Neurological:  Negative for dizziness, tremors, syncope, weakness, light-headedness, numbness and headaches. Hematological:  Negative for adenopathy. Does not bruise/bleed easily. Psychiatric/Behavioral:  Negative for agitation, behavioral problems, confusion, decreased concentration, dysphoric mood, hallucinations, self-injury, sleep disturbance and suicidal ideas. The patient is not nervous/anxious and is not hyperactive. /76   Pulse 75   Ht 5' 3\" (1.6 m)   Wt 253 lb 12.8 oz (115.1 kg)   SpO2 98%   BMI 44.96 kg/m²   Physical Exam  Vitals and nursing note reviewed. Constitutional:       General: She is not in acute distress. Appearance: Normal appearance. She is well-developed. She is not ill-appearing, toxic-appearing or diaphoretic. HENT:      Head: Normocephalic and atraumatic. Right Ear: Tympanic membrane, ear canal and external ear normal. There is no impacted cerumen. Left Ear: Tympanic membrane, ear canal and external ear normal. There is no impacted cerumen. Nose: Nose normal. No congestion or rhinorrhea. Mouth/Throat:      Mouth: Mucous membranes are moist.      Pharynx: Oropharynx is clear.  No oropharyngeal exudate or posterior oropharyngeal erythema. Eyes:      General: No scleral icterus. Right eye: No discharge. Left eye: No discharge. Extraocular Movements: Extraocular movements intact. Conjunctiva/sclera: Conjunctivae normal.      Pupils: Pupils are equal, round, and reactive to light. Neck:      Thyroid: No thyromegaly. Vascular: No carotid bruit or JVD. Trachea: No tracheal deviation. Cardiovascular:      Rate and Rhythm: Normal rate and regular rhythm. Pulses: Normal pulses. Heart sounds: Normal heart sounds. No murmur heard. No friction rub. No gallop. Pulmonary:      Effort: Pulmonary effort is normal. No respiratory distress. Breath sounds: Normal breath sounds. No stridor. No wheezing, rhonchi or rales. Chest:      Chest wall: No tenderness. Abdominal:      General: Bowel sounds are normal. There is no distension. Palpations: Abdomen is soft. There is no mass. Tenderness: There is no abdominal tenderness. There is no right CVA tenderness, left CVA tenderness, guarding or rebound. Hernia: No hernia is present. Musculoskeletal:         General: Tenderness present. No swelling, deformity or signs of injury. Normal range of motion. Cervical back: Normal range of motion and neck supple. No rigidity. No muscular tenderness. Right lower leg: No edema. Left lower leg: No edema. Comments: Some pain on palpation of the lower lumbar spine   Lymphadenopathy:      Cervical: No cervical adenopathy. Skin:     General: Skin is warm and dry. Coloration: Skin is not jaundiced or pale. Findings: No bruising, erythema, lesion or rash. Neurological:      General: No focal deficit present. Mental Status: She is alert and oriented to person, place, and time. Mental status is at baseline. Cranial Nerves: No cranial nerve deficit. Motor: No weakness or abnormal muscle tone.       Coordination: Coordination normal.      Gait: Gait normal. Deep Tendon Reflexes: Reflexes are normal and symmetric. Reflexes normal.   Psychiatric:         Mood and Affect: Mood normal.         Behavior: Behavior normal.         Thought Content: Thought content normal.         Judgment: Judgment normal.       1. Routine adult health maintenance    2. Chronic right-sided low back pain with right-sided sciatica    3. History of colon polyps    4. Vitamin D deficiency    5. Elevated liver enzymes    6. History of breast cancer        ASSESSMENT/PLAN:    49-year-old woman here for follow-up    1. Health maintenance: Routine screening is as per HPI. Labs are currently pending    2. Back pain: Try Medrol Dosepak. She can continue her Tylenol as needed. Lumbar spine x-ray ordered. If symptoms persist, will need further work-up    3. Vitamin D deficiency: Check a vitamin D level with next lab draw    4. Elevated liver enzymes: Labs are currently pending    5. History of breast cancer: Status postmastectomy. Followed by Dr. Garey Hammans    6. History of colon polyps: Refer to Dr. Annie Stewart was seen today for annual exam.    Diagnoses and all orders for this visit:    Routine adult health maintenance  -     CBC with Auto Differential; Future  -     Comprehensive Metabolic Panel; Future  -     Hemoglobin A1C; Future  -     Lipid Panel; Future  -     TSH; Future  -     Vitamin D 25 Hydroxy; Future    Chronic right-sided low back pain with right-sided sciatica  -     XR LUMBAR SPINE (MIN 4 VIEWS); Future    History of colon polyps  -     External Referral To Gastroenterology    Vitamin D deficiency    Elevated liver enzymes    History of breast cancer    Other orders  -     methylPREDNISolone (MEDROL DOSEPACK) 4 MG tablet; Take by mouth. No follow-ups on file.      Orders Placed This Encounter   Procedures    XR LUMBAR SPINE (MIN 4 VIEWS)     Standing Status:   Future     Number of Occurrences:   1     Standing Expiration Date:   9/7/2023     Order Specific Question:   Reason for exam:     Answer:   lumbago    CBC with Auto Differential     Fast 12 hours     Standing Status:   Future     Standing Expiration Date:   9/7/2023    Comprehensive Metabolic Panel     Fasting 12 hours     Standing Status:   Future     Standing Expiration Date:   9/7/2023    Hemoglobin A1C     Fast 12 hours     Standing Status:   Future     Standing Expiration Date:   9/7/2023    Lipid Panel     Standing Status:   Future     Standing Expiration Date:   9/7/2023     Order Specific Question:   Is Patient Fasting?/# of Hours     Answer:   12    TSH     Fast 12 hours     Standing Status:   Future     Standing Expiration Date:   9/7/2023    Vitamin D 25 Hydroxy     Standing Status:   Future     Standing Expiration Date:   9/7/2023    External Referral To Gastroenterology     Referral Priority:   Routine     Referral Type:   Eval and Treat     Referral Reason:   Specialty Services Required     Requested Specialty:   Gastroenterology     Number of Visits Requested:   1       Tom Tellez MD

## 2022-09-08 NOTE — TELEPHONE ENCOUNTER
Called and spoke with patient and informed her of the my chart message she sent in on the liver enzymes. Questions were answered, and she stated her ultrasound was scheduled. She was also notified of lab and x-ray results.

## 2022-09-10 ASSESSMENT — ENCOUNTER SYMPTOMS
BACK PAIN: 1
DIARRHEA: 0
EYE PAIN: 0
COUGH: 0
SORE THROAT: 0
NAUSEA: 0
FACIAL SWELLING: 0
ABDOMINAL DISTENTION: 0
BLOOD IN STOOL: 0
COLOR CHANGE: 0
SHORTNESS OF BREATH: 0
RECTAL PAIN: 0
SINUS PRESSURE: 0
EYE REDNESS: 0
EYE DISCHARGE: 0
TROUBLE SWALLOWING: 0
CONSTIPATION: 0
VOICE CHANGE: 0
RHINORRHEA: 0
PHOTOPHOBIA: 0
CHOKING: 0
ABDOMINAL PAIN: 0
EYE ITCHING: 0
VOMITING: 0
CHEST TIGHTNESS: 0
ANAL BLEEDING: 0
WHEEZING: 0

## 2022-09-15 ENCOUNTER — HOSPITAL ENCOUNTER (OUTPATIENT)
Dept: ULTRASOUND IMAGING | Age: 57
Discharge: HOME | End: 2022-09-15
Payer: MEDICAID

## 2022-09-15 DIAGNOSIS — R74.8 ELEVATED LIVER ENZYMES: ICD-10-CM

## 2022-09-15 PROCEDURE — 76705 ECHO EXAM OF ABDOMEN: CPT

## 2022-09-15 PROCEDURE — 76705 ECHO EXAM OF ABDOMEN: CPT | Performed by: RADIOLOGY

## 2022-09-23 NOTE — PROGRESS NOTES
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  She had a routine mammogram October 2014 finding calcifications in the upper outer quadrant of the left breast.  She was referred to Dr. Funes and stereotactic biopsy was performed on November 3, 2014.  The biopsy was consistent with ductal carcinoma in situ, high-grade.  The area measured 1.2 cm.  It was ER positive 99% RI positive 94% HER-2/davi was equivocal by FISH.  She returned to the operating room on November 17, 2014 and had a left breast simple mastectomy showing ductal carcinoma in situ grade 2.  There was no invasive carcinoma noted.  Margins of the mastectomy were free of involvement.  Her AJCC TNM stage pTis,NX,MX, stage 0.  She was placed on Arimidex therapy in December 2014 through sometime 2015.     Patient went on to have a prophylactic right mastectomy in 2015 per Dr Huff.  There was no malignancy in this breast.  She then stopped the Arimidex therapy.        PAST MEDICAL HISTORY:  ALLERGIES:  No Known Allergies    CURRENT MEDICATIONS:  Outpatient Encounter Medications as of 9/28/2022   Medication Sig Dispense Refill   • calcium citrate-vitamin d (CALCITRATE) 315-250 MG-UNIT tablet tablet Take  by mouth Daily.     • Lactobacillus (ACIDOPHILUS/BIFIDUS PO) Take  by mouth.     • Multiple Vitamins-Minerals (PRESERVISION AREDS 2 PO) Take 1 tablet by mouth 2 (Two) Times a Day.     • multivitamin with minerals (MULTIVITAL PO) Take 1 tablet by mouth Daily.     • vitamin C (ASCORBIC ACID) 250 MG tablet Take 250 mg by  mouth Daily.     • [DISCONTINUED] calcium carbonate (OS-JAVED) 600 MG tablet Take 600 mg by mouth Daily.     • [DISCONTINUED] oxybutynin (DITROPAN) 5 MG tablet Take 5 mg by mouth 3 (Three) Times a Day.       No facility-administered encounter medications on file as of 2022.     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 ENDOSCOPY;  Service: Gastroenterology   • HYSTERECTOMY     • MASTECTOMY Left      HEALTH MAINTENANCE ITEMS:    Last Completed Colonoscopy       Status Date      COLONOSCOPY Done 2019 COLONOSCOPY     Benign polyp removed          FAMILY HISTORY:  Family History   Problem Relation Age of Onset   • No Known Problems Father    • Colon polyps Mother    • No Known Problems Sister    • No Known Problems Brother    • No Known Problems Son    • No Known Problems Daughter    • Uterine cancer Maternal Grandfather    • Colon cancer Maternal Grandmother         In her 60's or 70's   • No Known Problems Paternal Grandmother    • No Known Problems Paternal Grandfather      SOCIAL HISTORY:  Social History     Socioeconomic History   • Marital status:    Tobacco Use   • Smoking status: Never Smoker   • Smokeless tobacco: Never Used   Substance and Sexual Activity   • Alcohol use: No   • Drug use: No   • Sexual activity: Defer       REVIEW OF SYSTEMS:  Review of Systems   Constitutional:  "Negative for activity change, appetite change, chills, diaphoresis, fatigue, fever and unexpected weight loss.        Manages her ADLs to include chores errands and driving.  Is up and about, \"all the time.\"  Says she has been the primary caregiver for her mother with a brain tumor and her father-in-law who passed away recently with lung cancer.  Had Covid 08/2022.  \"It was mild but it was still rough.\"  Says, \"Lots of issues this last year.\"   HENT: Negative for nosebleeds, sinus pressure, sore throat and voice change.    Eyes: Negative for blurred vision, double vision and visual disturbance.        She has had some problems with her vision, cataracts and macular degeneration and it was felt to be related to the Arimidex therapy.  For which she again has discontinued.  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       Respiratory: Negative for cough and shortness of breath.    Cardiovascular: Negative for chest pain, palpitations and leg swelling.   Gastrointestinal: Negative for abdominal pain, anal bleeding, blood in stool, constipation, diarrhea, nausea and vomiting.   Genitourinary: Negative for dysuria, frequency, hematuria, urgency and urinary incontinence.   Musculoskeletal: Negative for arthralgias and myalgias.   Skin: Negative for rash and skin lesions.   Neurological: Negative for dizziness, weakness and headache.   Hematological: Negative for adenopathy. Does not bruise/bleed easily.   Psychiatric/Behavioral: Negative for sleep disturbance and depressed mood.       /80   Pulse 69   Temp 97.3 °F (36.3 °C)   Resp 18   Ht 167.6 cm (66\")   Wt 109 kg (239 lb 6.4 oz)   LMP  (LMP Unknown)   SpO2 95%   Breastfeeding No   BMI 38.64 kg/m²  Body surface area is 2.16 meters squared.  Pain Score    09/28/22 0906   PainSc: 0-No pain       Physical Exam:  Physical Exam  Constitutional:       Appearance: Normal appearance. She is " "well-developed.      Comments: Pleasant, cooperative, obese middle-aged female.  Ambulatory.  ECOG 0    Has regained 55 lb in the last year due to calories > expenditure.  \"I've been taking care of other people but not myself.\"  Says she has started to exercise by walking daily and has cut back portions.  \"Dr. Sawant is helping me.\"   HENT:      Head: Atraumatic.   Eyes:      General: No scleral icterus.     Pupils: Pupils are equal, round, and reactive to light.   Neck:      Trachea: Trachea normal.   Cardiovascular:      Rate and Rhythm: Normal rate and regular rhythm.   Pulmonary:      Effort: Pulmonary effort is normal.      Breath sounds: Normal breath sounds. No wheezing, rhonchi or rales.   Chest:   Breasts:      Right: Absent.      Left: Absent.            Comments: Mastectomy incisions are healed with some scar thickening but no nodularity with no other palpable abnormality to chest wall  Abdominal:      Palpations: Abdomen is soft.      Tenderness: There is no abdominal tenderness. There is no guarding or rebound.   Musculoskeletal:      Cervical back: Neck supple.   Skin:     General: Skin is warm and dry.   Neurological:      General: No focal deficit present.      Mental Status: She is alert and oriented to person, place, and time.   Psychiatric:         Mood and Affect: Mood normal.         Behavior: Behavior normal.             LABS    Lab Results - Last 18 Months   Lab Units 09/07/22  0835 09/29/21  0759 08/30/21  0853   HEMOGLOBIN g/dL 14.1 13.6 14.4   HEMATOCRIT % 44.3 42.4 45.6   MCV fL 89.9 91.2 95.6   WBC K/uL 8.8 6.46 8.5   RDW % 14.7* 12.6 12.7   MPV fL 9.8 9.3 9.9   PLATELETS K/uL 283 241 255   IMM GRAN % %  --  0.3  --    NEUTROS ABS K/uL 6.2 4.00 6.3   LYMPHS ABS K/uL 1.7 1.68 1.5   MONOS ABS K/uL 0.60 0.49 0.50   EOS ABS K/uL 0.20 0.23 0.20   BASOS ABS K/uL 0.10 0.04 0.00   IMMATURE GRANS (ABS) K/uL 0.0 0.02 0.0   NRBC /100 WBC  --  0.0  --        Lab Results - Last 18 Months   Lab Units " 09/07/22  0835 10/12/21  1216 09/29/21  0759 08/30/21  0853   GLUCOSE mg/dL 112*  --  89 89   SODIUM mmol/L 141  --  143 143   POTASSIUM mmol/L 4.4  --  3.9 4.5   TOTAL CO2 mmol/L 28  --   --  26   CO2 mmol/L  --   --  30.0*  --    CHLORIDE mmol/L 101  --  107 104   ANION GAP mmol/L 12  --  6.0 13   CREATININE mg/dL 0.9 0.70 0.65 0.7   BUN mg/dL 17  --  10 8   BUN / CREAT RATIO   --   --  15.4  --    CALCIUM mg/dL 9.2  --  8.6 9.1   EGFR IF NONAFRICN AM  >60  --  95 >60   ALK PHOS U/L 177*  --  122* 128*   TOTAL PROTEIN g/dL 6.6  --  6.6 6.9   ALT (SGPT) U/L 50*  --  37* 33   AST (SGOT) U/L 39*  --  31 31   BILIRUBIN mg/dL 0.4  --  0.3 <0.2   ALBUMIN g/dL 4  --  4.00 4   GLOBULIN gm/dL  --   --  2.6  --        Lab Results - Last 18 Months   Lab Units 09/07/22  0835 08/30/21  0853   TSH uIU/mL 1.470 0.780     ASSESSMENT  1.  Left Breast DCIS diagnosed in October 2014  · Initial stage: AJCC TNM stage pTis,NX,MX, stage 0. ER positive at 99% MD positive at 94% HER-2/davi was equivocal by FISH.    · Treatment: Left breast simple mastectomy November 17, 2014, Arimidex started December 2014; prophylactic right mastectomy in 2015 and a REM attacks, Arimidex was then stopped by the physician.  · Disease status: No evidence of disease  2.  Bilateral cataracts status post surgery per Dr. Yarbrough  3.  Stress incontinence controlled with Ditropan  4.  Hematologically stable with a normal CBC CMP   5.  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  Chronically elevated alkaline phosphatase for the past 4 years.  It was initially elevated after gallbladder attack and removal.  It has improved over the previous 3 years but still chronically elevated will obtain an ultrasound of the liver.  -- 10/04/2021-ultrasound liver-normal liver.  Gallbladder not demonstrated consistent with surgical history.  Mild right hydronephrosis.  Recommend CT.  No bile duct dilatation.  -- 10/12/2021-CT abdomen/pelvis- area of peripheral enhancement in the  liver suggestive of an underlying lesion.  Follow-up MRI abdomen.  Cystitis.  Nonpathologically enlarged pelvic lymph nodes.  Reactive?  -- 10/26/2021-MRI liver-no evidence of metastatic disease in the abdomen.  1.4 cm hemangioma right posterior liver correlating with the lesion of concern identified on prior CT abdomen.    7.   Weight gain from excess calories    PLAN  1.  Draw labs: CBC with differential and CMP today.  2.  Apprised of liver ultrasound, 10/04/2021, CT abdomen/pelvis, 10/12/2021, MRI liver, 10/26/2021 (above).  No metastatic disease.  Hemangioma right posterior liver.    3.  Encourage patient to continue follow primary care provider and other specialists  4.  Weight management discussed  5.  Return in 1 year for follow-up with a pre-office CBC CMP    I spent 38 minutes caring for Madhav on this date of service. This time includes time spent by me in the following activities: preparing for the visit, reviewing tests, performing a medically appropriate examination and/or evaluation, counseling and educating the patient/family/caregiver, ordering medications, tests, or procedures and documenting information in the medical record.     Arya Haddad MD   09/29/2021

## 2022-09-28 ENCOUNTER — OFFICE VISIT (OUTPATIENT)
Dept: ONCOLOGY | Facility: CLINIC | Age: 57
End: 2022-09-28

## 2022-09-28 ENCOUNTER — LAB (OUTPATIENT)
Dept: LAB | Facility: HOSPITAL | Age: 57
End: 2022-09-28

## 2022-09-28 VITALS
HEART RATE: 69 BPM | BODY MASS INDEX: 38.47 KG/M2 | SYSTOLIC BLOOD PRESSURE: 142 MMHG | OXYGEN SATURATION: 95 % | TEMPERATURE: 97.3 F | DIASTOLIC BLOOD PRESSURE: 80 MMHG | RESPIRATION RATE: 18 BRPM | HEIGHT: 66 IN | WEIGHT: 239.4 LBS

## 2022-09-28 DIAGNOSIS — D05.12 DUCTAL CARCINOMA IN SITU OF LEFT BREAST: ICD-10-CM

## 2022-09-28 DIAGNOSIS — D05.12 DUCTAL CARCINOMA IN SITU OF LEFT BREAST: Primary | ICD-10-CM

## 2022-09-28 LAB
ALBUMIN SERPL-MCNC: 4.3 G/DL (ref 3.5–5.2)
ALBUMIN/GLOB SERPL: 1.4 G/DL
ALP SERPL-CCNC: 167 U/L (ref 39–117)
ALT SERPL W P-5'-P-CCNC: 35 U/L (ref 1–33)
ANION GAP SERPL CALCULATED.3IONS-SCNC: 8 MMOL/L (ref 5–15)
AST SERPL-CCNC: 34 U/L (ref 1–32)
BASOPHILS # BLD AUTO: 0.02 10*3/MM3 (ref 0–0.2)
BASOPHILS NFR BLD AUTO: 0.3 % (ref 0–1.5)
BILIRUB SERPL-MCNC: 0.4 MG/DL (ref 0–1.2)
BUN SERPL-MCNC: 7 MG/DL (ref 6–20)
BUN/CREAT SERPL: 9.7 (ref 7–25)
CALCIUM SPEC-SCNC: 9.4 MG/DL (ref 8.6–10.5)
CHLORIDE SERPL-SCNC: 104 MMOL/L (ref 98–107)
CO2 SERPL-SCNC: 29 MMOL/L (ref 22–29)
CREAT SERPL-MCNC: 0.72 MG/DL (ref 0.57–1)
DEPRECATED RDW RBC AUTO: 47.9 FL (ref 37–54)
EGFRCR SERPLBLD CKD-EPI 2021: 98.3 ML/MIN/1.73
EOSINOPHIL # BLD AUTO: 0.09 10*3/MM3 (ref 0–0.4)
EOSINOPHIL NFR BLD AUTO: 1.4 % (ref 0.3–6.2)
ERYTHROCYTE [DISTWIDTH] IN BLOOD BY AUTOMATED COUNT: 14.8 % (ref 12.3–15.4)
GLOBULIN UR ELPH-MCNC: 3 GM/DL
GLUCOSE SERPL-MCNC: 99 MG/DL (ref 65–99)
HCT VFR BLD AUTO: 43.6 % (ref 34–46.6)
HGB BLD-MCNC: 14.3 G/DL (ref 12–15.9)
IMM GRANULOCYTES # BLD AUTO: 0.01 10*3/MM3 (ref 0–0.05)
IMM GRANULOCYTES NFR BLD AUTO: 0.2 % (ref 0–0.5)
LYMPHOCYTES # BLD AUTO: 1.13 10*3/MM3 (ref 0.7–3.1)
LYMPHOCYTES NFR BLD AUTO: 17.8 % (ref 19.6–45.3)
MCH RBC QN AUTO: 29 PG (ref 26.6–33)
MCHC RBC AUTO-ENTMCNC: 32.8 G/DL (ref 31.5–35.7)
MCV RBC AUTO: 88.4 FL (ref 79–97)
MONOCYTES # BLD AUTO: 0.41 10*3/MM3 (ref 0.1–0.9)
MONOCYTES NFR BLD AUTO: 6.5 % (ref 5–12)
NEUTROPHILS NFR BLD AUTO: 4.69 10*3/MM3 (ref 1.7–7)
NEUTROPHILS NFR BLD AUTO: 73.8 % (ref 42.7–76)
NRBC BLD AUTO-RTO: 0 /100 WBC (ref 0–0.2)
PLATELET # BLD AUTO: 297 10*3/MM3 (ref 140–450)
PMV BLD AUTO: 9.6 FL (ref 6–12)
POTASSIUM SERPL-SCNC: 3.9 MMOL/L (ref 3.5–5.2)
PROT SERPL-MCNC: 7.3 G/DL (ref 6–8.5)
RBC # BLD AUTO: 4.93 10*6/MM3 (ref 3.77–5.28)
SODIUM SERPL-SCNC: 141 MMOL/L (ref 136–145)
WBC NRBC COR # BLD: 6.35 10*3/MM3 (ref 3.4–10.8)

## 2022-09-28 PROCEDURE — 80053 COMPREHEN METABOLIC PANEL: CPT

## 2022-09-28 PROCEDURE — 36415 COLL VENOUS BLD VENIPUNCTURE: CPT

## 2022-09-28 PROCEDURE — 99214 OFFICE O/P EST MOD 30 MIN: CPT | Performed by: INTERNAL MEDICINE

## 2022-09-28 PROCEDURE — 85025 COMPLETE CBC W/AUTO DIFF WBC: CPT

## 2022-09-28 RX ORDER — MULTIPLE VITAMINS W/ MINERALS TAB 9MG-400MCG
1 TAB ORAL DAILY
COMMUNITY

## 2022-09-28 RX ORDER — MULTIVIT WITH MINERALS/LUTEIN
250 TABLET ORAL DAILY
COMMUNITY

## 2022-09-28 NOTE — ANESTHESIA POSTPROCEDURE EVALUATION
"Patient: Madhav Tony    Procedure Summary     Date:  08/02/19 Room / Location:  [facility] ENDOSCOPY 6 / [facility] ENDOSCOPY    Anesthesia Start:  0910 Anesthesia Stop:  0942    Procedure:  COLONOSCOPY WITH ANESTHESIA (N/A ) Diagnosis:       Hx of colonic polyps      Family history of colon cancer      Family history of polyps in the colon      (Hx of colonic polyps [Z86.010])      (Family history of colon cancer [Z80.0])      (Family history of polyps in the colon [Z83.71])    Surgeon:  Lamar Gunter MD Provider:  Ted Soria CRNA    Anesthesia Type:  MAC ASA Status:  2          Anesthesia Type: MAC  Last vitals  BP   113/42 (08/02/19 0940)   Temp   97.6 °F (36.4 °C) (08/02/19 0738)   Pulse   68 (08/02/19 0940)   Resp   10 (08/02/19 0940)     SpO2   98 % (08/02/19 0940)     Post Anesthesia Care and Evaluation    Patient location during evaluation: PACU  Patient participation: complete - patient participated  Level of consciousness: awake and alert  Pain management: adequate  Airway patency: patent  Anesthetic complications: No anesthetic complications    Cardiovascular status: acceptable  Respiratory status: acceptable  Hydration status: acceptable    Comments: Blood pressure 113/42, pulse 68, temperature 97.6 °F (36.4 °C), temperature source Temporal, resp. rate 10, height 167.6 cm (66\"), weight 102 kg (224 lb), SpO2 98 %, not currently breastfeeding.    Pt discharged from PACU based on cherelle score >8      " Initial Anesthesia Post-op Note    Patient: Madina Zhou  Procedure(s) Performed: RIGHT FOOT PLANTAR PLANING (RESECTION OF HYPERTROPHIC BONE PLANTAR MIDFOOT) - RIGHT  Anesthesia type: General    Vitals Value Taken Time   Temp  09/28/22 1200   Pulse 84 09/28/22 1158   Resp 13 09/28/22 1158   SpO2 99 % 09/28/22 1158   /52 09/28/22 1158   Vitals shown include unvalidated device data.      Patient Location: PACU Phase 1  Post-op Vital Signs:stable  Level of Consciousness: sedated  Respiratory Status: spontaneous ventilation, face mask and oral airway  Cardiovascular stable  Hydration: euvolemic  Pain Management: adequately controlled  Handoff: Handoff to receiving clinician was performed and questions were answered  Vomiting: none  Nausea: None  Airway Patency:oral airway  Post-op Assessment: no complications and patient tolerated procedure well with no complications      No complications documented.

## 2022-10-04 ENCOUNTER — NURSE ONLY (OUTPATIENT)
Dept: INTERNAL MEDICINE | Age: 57
End: 2022-10-04
Payer: MEDICAID

## 2022-10-04 DIAGNOSIS — Z23 NEED FOR VACCINATION: Primary | ICD-10-CM

## 2022-10-04 PROCEDURE — 90471 IMMUNIZATION ADMIN: CPT | Performed by: INTERNAL MEDICINE

## 2022-10-04 PROCEDURE — 90674 CCIIV4 VAC NO PRSV 0.5 ML IM: CPT | Performed by: INTERNAL MEDICINE

## 2022-11-16 ENCOUNTER — TELEPHONE (OUTPATIENT)
Dept: INTERNAL MEDICINE | Age: 57
End: 2022-11-16

## 2022-11-16 NOTE — TELEPHONE ENCOUNTER
Patient called wanting you to review her labs from the ones her life insurance pulled vs the most recent CMP. She says the numbers have jumped on her liver enzymes and is concerned.    She is going to try to upload the results via dooub, but if she can't figure it out she will balwinder a hard copy tomorrow

## 2023-04-25 ENCOUNTER — PATIENT MESSAGE (OUTPATIENT)
Dept: INTERNAL MEDICINE | Age: 58
End: 2023-04-25

## 2023-04-25 RX ORDER — CLOBETASOL PROPIONATE 0.5 MG/G
OINTMENT TOPICAL
Qty: 30 G | Refills: 1 | Status: SHIPPED | OUTPATIENT
Start: 2023-04-25

## 2023-04-25 RX ORDER — METHYLPREDNISOLONE 4 MG/1
TABLET ORAL
Qty: 1 KIT | Refills: 0 | Status: SHIPPED | OUTPATIENT
Start: 2023-04-25 | End: 2023-05-07

## 2023-04-25 NOTE — TELEPHONE ENCOUNTER
Inez Allan called to request a refill on her medication. Last office visit : Visit date not found   Next office visit : Visit date not found     Requested Prescriptions     Pending Prescriptions Disp Refills    clobetasol (TEMOVATE) 0.05 % ointment 30 g 1     Sig: Apply topically 2 times daily. methylPREDNISolone (MEDROL DOSEPACK) 4 MG tablet 1 kit 0     Sig: Take 1 tablet by mouth See Admin Instructions for 6 days, THEN 1 tablet See Admin Instructions for 6 days. Take by mouth. Wil Ventura MA    Clobetasol cream and a Medrol Dosepak. Per Dr. Stephanie Yepez.

## 2023-04-25 NOTE — TELEPHONE ENCOUNTER
From: Sin Upton  To: Dr. Ban Barbosa: 4/25/2023 12:04 PM CDT  Subject: Steroid Pack and Cream    Good afternoon Dr. 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 I was hoping to get a steroid pack and then some sort of cream to help me as well. I would appreciate you sending both of these to the [employer] at [facility].  Thank you!!

## 2023-08-03 RX ORDER — CLOBETASOL PROPIONATE 0.5 MG/G
OINTMENT TOPICAL
Qty: 30 G | Refills: 1 | Status: SHIPPED | OUTPATIENT
Start: 2023-08-03

## 2023-09-12 ENCOUNTER — TELEPHONE (OUTPATIENT)
Dept: ONCOLOGY | Facility: CLINIC | Age: 58
End: 2023-09-12

## 2023-09-12 NOTE — TELEPHONE ENCOUNTER
Caller: Madhav Tony    Relationship to patient: Self    Best call back number: [phone]     Chief complaint: NEEDS TO R/S PT HAD JURY DUTY    Type of visit: LAB AND FOLLOW UP    Requested date: SOMETIME IN DECEMBER EARLY MORNING. NO TUESDAYS.      If rescheduling, when is the original appointment: 09/20/23 AND 09/27/23    Additional notes: PLEASE CALL WITH NEW APPT DATE/TIME

## 2023-11-29 DIAGNOSIS — R74.8 ELEVATED LIVER ENZYMES: ICD-10-CM

## 2023-11-29 DIAGNOSIS — E55.9 VITAMIN D DEFICIENCY: Primary | ICD-10-CM

## 2023-11-29 DIAGNOSIS — Z00.00 ROUTINE ADULT HEALTH MAINTENANCE: ICD-10-CM

## 2023-11-29 DIAGNOSIS — E55.9 VITAMIN D DEFICIENCY: ICD-10-CM

## 2023-11-29 LAB
25(OH)D3 SERPL-MCNC: 44.9 NG/ML
ALBUMIN SERPL-MCNC: 4.1 G/DL (ref 3.5–5.2)
ALP SERPL-CCNC: 167 U/L (ref 35–104)
ALT SERPL-CCNC: 18 U/L (ref 5–33)
ANION GAP SERPL CALCULATED.3IONS-SCNC: 10 MMOL/L (ref 7–19)
AST SERPL-CCNC: 23 U/L (ref 5–32)
BASOPHILS # BLD: 0 K/UL (ref 0–0.2)
BASOPHILS NFR BLD: 0.4 % (ref 0–1)
BILIRUB SERPL-MCNC: 0.4 MG/DL (ref 0.2–1.2)
BUN SERPL-MCNC: 10 MG/DL (ref 6–20)
CALCIUM SERPL-MCNC: 9.8 MG/DL (ref 8.6–10)
CHLORIDE SERPL-SCNC: 105 MMOL/L (ref 98–111)
CHOLEST SERPL-MCNC: 142 MG/DL (ref 160–199)
CO2 SERPL-SCNC: 29 MMOL/L (ref 22–29)
CREAT SERPL-MCNC: 0.7 MG/DL (ref 0.5–0.9)
EOSINOPHIL # BLD: 0.1 K/UL (ref 0–0.6)
EOSINOPHIL NFR BLD: 1.4 % (ref 0–5)
ERYTHROCYTE [DISTWIDTH] IN BLOOD BY AUTOMATED COUNT: 14.3 % (ref 11.5–14.5)
GLUCOSE SERPL-MCNC: 90 MG/DL (ref 74–109)
HBA1C MFR BLD: 5.6 % (ref 4–6)
HCT VFR BLD AUTO: 44.6 % (ref 37–47)
HDLC SERPL-MCNC: 41 MG/DL (ref 65–121)
HGB BLD-MCNC: 14.1 G/DL (ref 12–16)
IMM GRANULOCYTES # BLD: 0 K/UL
LDLC SERPL CALC-MCNC: 84 MG/DL
LYMPHOCYTES # BLD: 1.5 K/UL (ref 1.1–4.5)
LYMPHOCYTES NFR BLD: 21 % (ref 20–40)
MCH RBC QN AUTO: 28.5 PG (ref 27–31)
MCHC RBC AUTO-ENTMCNC: 31.6 G/DL (ref 33–37)
MCV RBC AUTO: 90.3 FL (ref 81–99)
MONOCYTES # BLD: 0.5 K/UL (ref 0–0.9)
MONOCYTES NFR BLD: 6.6 % (ref 0–10)
NEUTROPHILS # BLD: 4.9 K/UL (ref 1.5–7.5)
NEUTS SEG NFR BLD: 70.5 % (ref 50–65)
PLATELET # BLD AUTO: 308 K/UL (ref 130–400)
PMV BLD AUTO: 10.9 FL (ref 9.4–12.3)
POTASSIUM SERPL-SCNC: 4.1 MMOL/L (ref 3.5–5)
PROT SERPL-MCNC: 7.2 G/DL (ref 6.6–8.7)
RBC # BLD AUTO: 4.94 M/UL (ref 4.2–5.4)
SODIUM SERPL-SCNC: 144 MMOL/L (ref 136–145)
TRIGL SERPL-MCNC: 87 MG/DL (ref 0–149)
TSH SERPL DL<=0.005 MIU/L-ACNC: 1.59 UIU/ML (ref 0.27–4.2)
WBC # BLD AUTO: 6.9 K/UL (ref 4.8–10.8)

## 2023-12-05 ASSESSMENT — PATIENT HEALTH QUESTIONNAIRE - PHQ9
1. LITTLE INTEREST OR PLEASURE IN DOING THINGS: 0
2. FEELING DOWN, DEPRESSED OR HOPELESS: NOT AT ALL
SUM OF ALL RESPONSES TO PHQ QUESTIONS 1-9: 0
SUM OF ALL RESPONSES TO PHQ9 QUESTIONS 1 & 2: 0
SUM OF ALL RESPONSES TO PHQ9 QUESTIONS 1 & 2: 0
SUM OF ALL RESPONSES TO PHQ QUESTIONS 1-9: 0
2. FEELING DOWN, DEPRESSED OR HOPELESS: 0
1. LITTLE INTEREST OR PLEASURE IN DOING THINGS: NOT AT ALL

## 2023-12-08 ENCOUNTER — OFFICE VISIT (OUTPATIENT)
Dept: INTERNAL MEDICINE | Age: 58
End: 2023-12-08
Payer: MEDICAID

## 2023-12-08 VITALS
WEIGHT: 221 LBS | BODY MASS INDEX: 35.52 KG/M2 | HEART RATE: 78 BPM | SYSTOLIC BLOOD PRESSURE: 120 MMHG | DIASTOLIC BLOOD PRESSURE: 72 MMHG | HEIGHT: 66 IN | RESPIRATION RATE: 20 BRPM | OXYGEN SATURATION: 97 %

## 2023-12-08 DIAGNOSIS — Z00.00 ROUTINE ADULT HEALTH MAINTENANCE: Primary | ICD-10-CM

## 2023-12-08 DIAGNOSIS — R73.9 HYPERGLYCEMIA: ICD-10-CM

## 2023-12-08 DIAGNOSIS — E55.9 VITAMIN D DEFICIENCY: ICD-10-CM

## 2023-12-08 DIAGNOSIS — D05.12 DUCTAL CARCINOMA IN SITU OF LEFT BREAST: ICD-10-CM

## 2023-12-08 PROCEDURE — 99396 PREV VISIT EST AGE 40-64: CPT | Performed by: INTERNAL MEDICINE

## 2023-12-08 PROCEDURE — G8482 FLU IMMUNIZE ORDER/ADMIN: HCPCS | Performed by: INTERNAL MEDICINE

## 2023-12-08 RX ORDER — ASCORBIC ACID 250 MG
250 TABLET ORAL DAILY
COMMUNITY

## 2023-12-08 SDOH — ECONOMIC STABILITY: INCOME INSECURITY: HOW HARD IS IT FOR YOU TO PAY FOR THE VERY BASICS LIKE FOOD, HOUSING, MEDICAL CARE, AND HEATING?: NOT HARD AT ALL

## 2023-12-08 SDOH — ECONOMIC STABILITY: FOOD INSECURITY: WITHIN THE PAST 12 MONTHS, YOU WORRIED THAT YOUR FOOD WOULD RUN OUT BEFORE YOU GOT MONEY TO BUY MORE.: NEVER TRUE

## 2023-12-08 SDOH — ECONOMIC STABILITY: FOOD INSECURITY: WITHIN THE PAST 12 MONTHS, THE FOOD YOU BOUGHT JUST DIDN'T LAST AND YOU DIDN'T HAVE MONEY TO GET MORE.: NEVER TRUE

## 2023-12-08 SDOH — ECONOMIC STABILITY: HOUSING INSECURITY
IN THE LAST 12 MONTHS, WAS THERE A TIME WHEN YOU DID NOT HAVE A STEADY PLACE TO SLEEP OR SLEPT IN A SHELTER (INCLUDING NOW)?: NO

## 2023-12-18 ENCOUNTER — LAB (OUTPATIENT)
Dept: LAB | Facility: HOSPITAL | Age: 58
End: 2023-12-18
Payer: COMMERCIAL

## 2023-12-18 DIAGNOSIS — D05.12 DUCTAL CARCINOMA IN SITU OF LEFT BREAST: ICD-10-CM

## 2023-12-18 DIAGNOSIS — D05.12 DUCTAL CARCINOMA IN SITU OF LEFT BREAST: Primary | ICD-10-CM

## 2023-12-18 LAB
ALBUMIN SERPL-MCNC: 4 G/DL (ref 3.5–5.2)
ALBUMIN/GLOB SERPL: 1.3 G/DL
ALP SERPL-CCNC: 171 U/L (ref 39–117)
ALT SERPL W P-5'-P-CCNC: 21 U/L (ref 1–33)
ANION GAP SERPL CALCULATED.3IONS-SCNC: 10 MMOL/L (ref 5–15)
AST SERPL-CCNC: 22 U/L (ref 1–32)
BASOPHILS # BLD AUTO: 0.03 10*3/MM3 (ref 0–0.2)
BASOPHILS NFR BLD AUTO: 0.4 % (ref 0–1.5)
BILIRUB SERPL-MCNC: 0.2 MG/DL (ref 0–1.2)
BUN SERPL-MCNC: 7 MG/DL (ref 6–20)
BUN/CREAT SERPL: 8.6 (ref 7–25)
CALCIUM SPEC-SCNC: 9.3 MG/DL (ref 8.6–10.5)
CHLORIDE SERPL-SCNC: 103 MMOL/L (ref 98–107)
CO2 SERPL-SCNC: 27 MMOL/L (ref 22–29)
CREAT SERPL-MCNC: 0.81 MG/DL (ref 0.57–1)
DEPRECATED RDW RBC AUTO: 46.2 FL (ref 37–54)
EGFRCR SERPLBLD CKD-EPI 2021: 84.3 ML/MIN/1.73
EOSINOPHIL # BLD AUTO: 0.13 10*3/MM3 (ref 0–0.4)
EOSINOPHIL NFR BLD AUTO: 1.8 % (ref 0.3–6.2)
ERYTHROCYTE [DISTWIDTH] IN BLOOD BY AUTOMATED COUNT: 14.4 % (ref 12.3–15.4)
GLOBULIN UR ELPH-MCNC: 3.1 GM/DL
GLUCOSE SERPL-MCNC: 96 MG/DL (ref 65–99)
HCT VFR BLD AUTO: 45.1 % (ref 34–46.6)
HGB BLD-MCNC: 14.4 G/DL (ref 12–15.9)
IMM GRANULOCYTES # BLD AUTO: 0.03 10*3/MM3 (ref 0–0.05)
IMM GRANULOCYTES NFR BLD AUTO: 0.4 % (ref 0–0.5)
LYMPHOCYTES # BLD AUTO: 1.38 10*3/MM3 (ref 0.7–3.1)
LYMPHOCYTES NFR BLD AUTO: 19.2 % (ref 19.6–45.3)
MCH RBC QN AUTO: 27.9 PG (ref 26.6–33)
MCHC RBC AUTO-ENTMCNC: 31.9 G/DL (ref 31.5–35.7)
MCV RBC AUTO: 87.4 FL (ref 79–97)
MONOCYTES # BLD AUTO: 0.47 10*3/MM3 (ref 0.1–0.9)
MONOCYTES NFR BLD AUTO: 6.6 % (ref 5–12)
NEUTROPHILS NFR BLD AUTO: 5.13 10*3/MM3 (ref 1.7–7)
NEUTROPHILS NFR BLD AUTO: 71.6 % (ref 42.7–76)
NRBC BLD AUTO-RTO: 0 /100 WBC (ref 0–0.2)
PLATELET # BLD AUTO: 317 10*3/MM3 (ref 140–450)
PMV BLD AUTO: 10.4 FL (ref 6–12)
POTASSIUM SERPL-SCNC: 3.8 MMOL/L (ref 3.5–5.2)
PROT SERPL-MCNC: 7.1 G/DL (ref 6–8.5)
RBC # BLD AUTO: 5.16 10*6/MM3 (ref 3.77–5.28)
SODIUM SERPL-SCNC: 140 MMOL/L (ref 136–145)
WBC NRBC COR # BLD AUTO: 7.17 10*3/MM3 (ref 3.4–10.8)

## 2023-12-18 PROCEDURE — 36415 COLL VENOUS BLD VENIPUNCTURE: CPT

## 2023-12-18 PROCEDURE — 85025 COMPLETE CBC W/AUTO DIFF WBC: CPT

## 2023-12-18 PROCEDURE — 80053 COMPREHEN METABOLIC PANEL: CPT

## 2023-12-30 NOTE — PROGRESS NOTES
[facility]  HEMATOLOGY & ONCOLOGY    [facility] ONC [facility] HEMATOLOGY AND ONCOLOGY  [address]  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  She had a routine mammogram October 2014 finding calcifications in the upper outer quadrant of the left breast.  She was referred to Dr. Funes and stereotactic biopsy was performed on November 3, 2014.  The biopsy was consistent with ductal carcinoma in situ, high-grade.  The area measured 1.2 cm.  It was ER positive 99% DC positive 94% HER-2/davi was equivocal by FISH.  She returned to the operating room on November 17, 2014 and had a left breast simple mastectomy showing ductal carcinoma in situ grade 2.  There was no invasive carcinoma noted.  Margins of the mastectomy were free of involvement.  Her AJCC TNM stage pTis,NX,MX, stage 0.  She was placed on Arimidex therapy in December 2014 through sometime 2015.     Patient went on to have a prophylactic right mastectomy in 2015 per Dr Huff.  There was no malignancy in this breast.  She then stopped the Arimidex therapy.      I have reviewed the HPI and verified with the patient the accuracy of it. No changes to interval history since the information was documented. Arya Haddad MD 01/04/24      PAST MEDICAL HISTORY:  ALLERGIES:  No Known Allergies    CURRENT MEDICATIONS:  Outpatient Encounter Medications as of 1/4/2024   Medication Sig Dispense Refill    calcium citrate-vitamin d (CALCITRATE) 315-250 MG-UNIT tablet tablet Take  by mouth Daily.      Lactobacillus (ACIDOPHILUS/BIFIDUS PO) Take  by mouth.      Multiple Vitamins-Minerals (PRESERVISION AREDS 2 PO) Take 1  tablet by mouth 2 (Two) Times a Day.      multivitamin with minerals (MULTIVITAL PO) Take 1 tablet by mouth Daily.      vitamin C (ASCORBIC ACID) 250 MG tablet Take 1 tablet by mouth Daily.       No facility-administered encounter medications on file as of 2024.     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     SURGERIES:  Past Surgical History:   Procedure Laterality Date    BREAST BIOPSY Right     CATARACT EXTRACTION, BILATERAL       SECTION      CHOLECYSTECTOMY  2019    COLONOSCOPY  2015    4mm tubular adenomatous polyp in transverse colon; Repeat 3 years    COLONOSCOPY N/A 2019    Procedure: COLONOSCOPY WITH ANESTHESIA;  Surgeon: Lamar Gunter MD;  Location: [facility] ENDOSCOPY;  Service: Gastroenterology    HYSTERECTOMY      MASTECTOMY Left      HEALTH MAINTENANCE ITEMS:    Last Completed Colonoscopy         Status Date      COLONOSCOPY Done 2019 COLONOSCOPY     Benign polyp removed            FAMILY HISTORY:  Family History   Problem Relation Age of Onset    No Known Problems Father     Colon polyps Mother     No Known Problems Sister     No Known Problems Brother     No Known Problems Son     No Known Problems Daughter     Uterine cancer Maternal Grandfather     Colon cancer Maternal Grandmother         In her 60's or 70's    No Known Problems Paternal Grandmother     No Known Problems Paternal Grandfather      SOCIAL HISTORY:  Social History     Socioeconomic History    Marital status:    Tobacco Use    Smoking status: Never    Smokeless tobacco: Never   Substance and Sexual Activity    Alcohol use: No    Drug use: No    Sexual activity: Defer       REVIEW OF SYSTEMS:  Review of Systems   Constitutional:  Negative for activity change, appetite change,  "chills, diaphoresis, fatigue, fever and unexpected weight loss.        Manages her ADLs to include chores errands and driving.  Is up and about, \"all the time.\"  Says she has been the primary caregiver for her mother with a brain tumor and her father-in-law who passed away with lung cancer.  Says her mother-in-law also recently passed away.  Had Covid 08/2022 but none since.   HENT:  Negative for nosebleeds, sinus pressure, sore throat and voice change.    Eyes:  Negative for blurred vision, double vision and visual disturbance.        She has had some problems with her vision, cataracts and macular degeneration and it was felt to be related to the Arimidex therapy.  For which she again has discontinued.  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       Respiratory:  Negative for cough and shortness of breath.    Cardiovascular:  Negative for chest pain, palpitations and leg swelling.   Gastrointestinal:  Negative for abdominal pain, anal bleeding, blood in stool, constipation, diarrhea, nausea and vomiting.   Genitourinary:  Negative for dysuria, frequency, hematuria, urgency and urinary incontinence.   Musculoskeletal:  Negative for arthralgias and myalgias.   Skin:  Negative for rash and skin lesions.   Neurological:  Negative for dizziness, weakness and headache.   Hematological:  Negative for adenopathy. Does not bruise/bleed easily.   Psychiatric/Behavioral:  Negative for sleep disturbance and depressed mood.        /70   Pulse 69   Temp 98.3 °F (36.8 °C) (Temporal)   Resp 18   Ht 170.2 cm (67\")   Wt 97.3 kg (214 lb 8 oz)   LMP  (LMP Unknown)   SpO2 95%   BMI 33.60 kg/m²  Body surface area is 2.08 meters squared.  Pain Score    01/04/24 0829   PainSc: 0-No pain       Physical Exam  Constitutional:       Appearance: Normal appearance. She is well-developed.      Comments: Pleasant, cooperative, obese middle-aged female.  Ambulatory.  " "ECOG 0    Has intentionally lost 25 lb (had previously gained 55 lb in the last year due to calories > expenditure.  \"I am finally taking care of myself\")  Says she is exercising by walking daily and has cut back portions.     HENT:      Head: Atraumatic.   Eyes:      General: No scleral icterus.     Pupils: Pupils are equal, round, and reactive to light.   Neck:      Trachea: Trachea normal.   Cardiovascular:      Rate and Rhythm: Normal rate and regular rhythm.   Pulmonary:      Effort: Pulmonary effort is normal.      Breath sounds: Normal breath sounds. No wheezing, rhonchi or rales.   Chest:   Breasts:     Right: Absent.      Left: Absent.          Comments: Mastectomy incisions are healed with some scar thickening but no nodularity with no other palpable abnormality to chest wall  Abdominal:      Palpations: Abdomen is soft.      Tenderness: There is no abdominal tenderness. There is no guarding or rebound.   Musculoskeletal:      Cervical back: Neck supple.   Skin:     General: Skin is warm and dry.   Neurological:      General: No focal deficit present.      Mental Status: She is alert and oriented to person, place, and time.   Psychiatric:         Mood and Affect: Mood normal.         Behavior: Behavior normal.             LABS    Lab Results - Last 18 Months   Lab Units 12/18/23  0817 11/29/23  0901 09/28/22  1015 09/07/22  0835   HEMOGLOBIN g/dL 14.4 14.1 14.3 14.1   HEMATOCRIT % 45.1 44.6 43.6 44.3   MCV fL 87.4 90.3 88.4 89.9   WBC 10*3/mm3 7.17 6.9 6.35 8.8   RDW % 14.4 14.3 14.8 14.7*   MPV fL 10.4 10.9 9.6 9.8   PLATELETS 10*3/mm3 317 308 297 283   IMM GRAN % % 0.4  --  0.2  --    NEUTROS ABS 10*3/mm3 5.13 4.9 4.69 6.2   LYMPHS ABS 10*3/mm3 1.38 1.5 1.13 1.7   MONOS ABS 10*3/mm3 0.47 0.50 0.41 0.60   EOS ABS 10*3/mm3 0.13 0.10 0.09 0.20   BASOS ABS 10*3/mm3 0.03 0.00 0.02 0.10   IMMATURE GRANS (ABS) 10*3/mm3 0.03 0.0 0.01 0.0   NRBC /100 WBC 0.0  --  0.0  --        Lab Results - Last 18 Months   Lab " Units 12/18/23  0817 09/28/22  1015 09/07/22  0835   GLUCOSE mg/dL 96 99 112*   SODIUM mmol/L 140 141 141   POTASSIUM mmol/L 3.8 3.9 4.4   TOTAL CO2 mmol/L  --   --  28   CO2 mmol/L 27.0 29.0  --    CHLORIDE mmol/L 103 104 101   ANION GAP mmol/L 10.0 8.0 12   CREATININE mg/dL 0.81 0.72 0.9   BUN mg/dL 7 7 17   BUN / CREAT RATIO  8.6 9.7  --    CALCIUM mg/dL 9.3 9.4 9.2   EGFR IF NONAFRICN AM   --   --  >60   ALK PHOS U/L 171* 167* 177*   TOTAL PROTEIN g/dL 7.1 7.3 6.6   ALT (SGPT) U/L 21 35* 50*   AST (SGOT) U/L 22 34* 39*   BILIRUBIN mg/dL 0.2 0.4 0.4   ALBUMIN g/dL 4.0 4.30 4.0   GLOBULIN gm/dL 3.1 3.0  --        Lab Results - Last 18 Months   Lab Units 11/29/23  0901 09/07/22  0835   TSH uIU/mL 1.590 1.470     ASSESSMENT  1.  Left Breast DCIS diagnosed in October 2014  Initial stage: AJCC TNM stage pTis,NX,MX, stage 0. ER positive at 99% ME positive at 94% HER-2/davi was equivocal by FISH.    Treatment: Left breast simple mastectomy November 17, 2014, Arimidex started December 2014; prophylactic right mastectomy in 2015 and a REM attacks, Arimidex was then stopped by the physician.  Disease status: No evidence of disease  2.  Stress incontinence controlled with Ditropan  3.  Chronically elevated alkaline phosphatase.  It was initially elevated after gallbladder attack and removal.    -- 10/04/2021-ultrasound liver-normal liver.  Gallbladder not demonstrated consistent with surgical history.  Mild right hydronephrosis.  Recommend CT.  No bile duct dilatation.  -- 10/12/2021-CT abdomen/pelvis- area of peripheral enhancement in the liver suggestive of an underlying lesion.  Follow-up MRI abdomen.  Cystitis.  Nonpathologically enlarged pelvic lymph nodes.  Reactive?  -- 10/26/2021-MRI liver-no evidence of metastatic disease in the abdomen.  1.4 cm hemangioma right posterior liver correlating with the lesion of concern identified on prior CT abdomen.  --9/17/23- Liver US-Diffusely fatty liver, as above, correlate as to  steatohepatitis.  Status post prior cholecystectomy, without evidence of abnormal biliary tree dilatation.   --12/28/23- a phos 171/ALT 21/AST 22 (prior peak: 246/ALT peak: 330/ AST peak: 277)    4.   Weight gain from excess calories.  Curbed by portion control and daily exercise (walking)    PLAN  1.  Apprised of labs, 12/18/23-alk phos 171 (stable) otherwise normal CMP. Normal CBC  2.  Apprised of liver ultrasound, 9/17/23 (above).  Fatty liver  3.  Encourage patient to continue follow primary care provider and other specialists  4.  Weight management again discussed.  Applaud her efforts  5.  Return in 1 year for follow-up with a pre-office CBC CMP    I spent ~33 minutes caring for Madhav on this date of service. This time includes time spent by me in the following activities: preparing for the visit, reviewing tests, performing a medically appropriate examination and/or evaluation, counseling and educating the patient/family/caregiver, ordering medications, tests, or procedures and documenting information in the medical record.

## 2024-01-04 ENCOUNTER — OFFICE VISIT (OUTPATIENT)
Dept: ONCOLOGY | Facility: CLINIC | Age: 59
End: 2024-01-04
Payer: COMMERCIAL

## 2024-01-04 VITALS
HEART RATE: 69 BPM | TEMPERATURE: 98.3 F | SYSTOLIC BLOOD PRESSURE: 122 MMHG | RESPIRATION RATE: 18 BRPM | BODY MASS INDEX: 33.67 KG/M2 | DIASTOLIC BLOOD PRESSURE: 70 MMHG | OXYGEN SATURATION: 95 % | HEIGHT: 67 IN | WEIGHT: 214.5 LBS

## 2024-01-04 DIAGNOSIS — D05.12 DUCTAL CARCINOMA IN SITU OF LEFT BREAST: Primary | ICD-10-CM

## 2024-05-13 RX ORDER — CLOBETASOL PROPIONATE 0.5 MG/G
OINTMENT TOPICAL
Qty: 30 G | Refills: 1 | Status: SHIPPED | OUTPATIENT
Start: 2024-05-13

## 2024-08-07 ENCOUNTER — OFFICE VISIT (OUTPATIENT)
Dept: GASTROENTEROLOGY | Facility: CLINIC | Age: 59
End: 2024-08-07
Payer: COMMERCIAL

## 2024-08-07 VITALS
SYSTOLIC BLOOD PRESSURE: 116 MMHG | TEMPERATURE: 97.3 F | HEIGHT: 67 IN | WEIGHT: 171 LBS | DIASTOLIC BLOOD PRESSURE: 72 MMHG | OXYGEN SATURATION: 99 % | HEART RATE: 63 BPM | BODY MASS INDEX: 26.84 KG/M2

## 2024-08-07 DIAGNOSIS — Z86.010 HX OF ADENOMATOUS COLONIC POLYPS: Primary | ICD-10-CM

## 2024-08-07 DIAGNOSIS — Z80.0 FAMILY HISTORY OF COLON CANCER: ICD-10-CM

## 2024-08-07 DIAGNOSIS — Z83.719 FAMILY HISTORY OF POLYPS IN THE COLON: ICD-10-CM

## 2024-08-07 PROBLEM — Z86.0101 HX OF ADENOMATOUS COLONIC POLYPS: Status: ACTIVE | Noted: 2019-07-24

## 2024-08-07 NOTE — PROGRESS NOTES
Primary Physician: Shweta Sawant MD    Chief Complaint   Patient presents with    Colon Cancer Screening     Pt presents today for colon recall-last colon was 2019; Personal history of adenomatous and hyperplastic polyps; Family history of colon cancer and colon polyps       Subjective     Madhav Tony is a 58 y.o. female.    HPI  History of adenomatous colon polyp  2019 was last colonoscopy at which time 6 mm adenomatous polyp of the ascending colon and 7 mm hyperplastic polyp of the transverse colon.    Previous adenomatous polyp removed in .    Patient denies any change of bowel habits.  No diarrhea, constipation, abdominal pain or rectal bleeding.      Family history of colon cancer/colon polyps  Maternal grandmother had colon cancer in her 60s or 70s.  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        Past Surgical History:   Procedure Laterality Date    BREAST BIOPSY Right     CATARACT EXTRACTION, BILATERAL       SECTION      CHOLECYSTECTOMY  2019    COLONOSCOPY  2015    One 4mm tubular adenomatous polyp in transverse colon; Repeat 3 years    COLONOSCOPY N/A 2019    One 6mm adenomatous polyp in the ascending colon; One 7mm hyperplastic polyp in the transverse colon; The examination was otherwise normal on direct and retroflexion views; Repeat 5 years    HYSTERECTOMY      MASTECTOMY Left 2014        Current Outpatient Medications:     calcium citrate-vitamin d (CALCITRATE) 315-250 MG-UNIT tablet tablet, Take 1 tablet by mouth Daily., Disp: , Rfl:     Multiple Vitamins-Minerals (PRESERVISION AREDS 2 PO), Take 1 tablet by mouth 2 (Two) Times a Day., Disp: , Rfl:     multivitamin with minerals  "(MULTIVITAL PO), Take 1 tablet by mouth Daily., Disp: , Rfl:     vitamin C (ASCORBIC ACID) 250 MG tablet, Take 1 tablet by mouth Daily., Disp: , Rfl:     No Known Allergies    Social History     Socioeconomic History    Marital status:    Tobacco Use    Smoking status: Never    Smokeless tobacco: Never   Vaping Use    Vaping status: Never Used   Substance and Sexual Activity    Alcohol use: No    Drug use: No    Sexual activity: Defer       Family History   Problem Relation Age of Onset    Colon polyps Mother         < 60 years of age    No Known Problems Father     No Known Problems Sister     No Known Problems Brother     Uterine cancer Maternal Grandmother     Colon cancer Maternal Grandmother         In her 60's or 70's    No Known Problems Paternal Grandmother     No Known Problems Paternal Grandfather     No Known Problems Daughter     No Known Problems Son     Esophageal cancer Neg Hx     Liver cancer Neg Hx     Stomach cancer Neg Hx     Liver disease Neg Hx     Rectal cancer Neg Hx        Review of Systems   Constitutional:  Negative for unexpected weight change.   Respiratory:  Negative for shortness of breath.    Cardiovascular:  Negative for chest pain.       Objective     /72 (BP Location: Left arm, Patient Position: Sitting, Cuff Size: Adult)   Pulse 63   Temp 97.3 °F (36.3 °C) (Infrared)   Ht 170.2 cm (67\")   Wt 77.6 kg (171 lb)   LMP  (LMP Unknown)   SpO2 99%   Breastfeeding No   BMI 26.78 kg/m²     Physical Exam  Vitals reviewed.   Constitutional:       Appearance: Normal appearance.   Cardiovascular:      Rate and Rhythm: Normal rate and regular rhythm.      Heart sounds: Normal heart sounds.   Pulmonary:      Effort: Pulmonary effort is normal.      Breath sounds: Normal breath sounds.   Neurological:      Mental Status: She is alert.         Lab Results - Last 18 Months   Lab Units 12/18/23  0817   GLUCOSE mg/dL 96   BUN mg/dL 7   CREATININE mg/dL 0.81   SODIUM mmol/L 140 "   POTASSIUM mmol/L 3.8   CHLORIDE mmol/L 103   CO2 mmol/L 27.0   TOTAL PROTEIN g/dL 7.1   ALBUMIN g/dL 4.0   ALT (SGPT) U/L 21   AST (SGOT) U/L 22   ALK PHOS U/L 171*   BILIRUBIN mg/dL 0.2   GLOBULIN gm/dL 3.1       Lab Results - Last 18 Months   Lab Units 12/18/23  0817 11/29/23  0901   HEMOGLOBIN g/dL 14.4 14.1   HEMATOCRIT % 45.1 44.6   MCV fL 87.4 90.3   WBC 10*3/mm3 7.17 6.9   RDW % 14.4 14.3   MPV fL 10.4 10.9   PLATELETS 10*3/mm3 317 308       Lab Results - Last 18 Months   Lab Units 11/29/23  0901   TSH uIU/mL 1.590   VIT D 25 HYDROXY ng/mL 44.9              IMPRESSION/PLAN:    Assessment & Plan      Problem List Items Addressed This Visit       Hx of adenomatous colonic polyps - Primary    Overview     2015 adenomatous polyp resected.  8/2/2019 adenomatous polyp of the ascending colon.         Relevant Orders    Case Request (Completed)    Family history of colon cancer    Overview     Maternal grandmother greater than 60         Relevant Orders    Case Request (Completed)    Family history of polyps in the colon    Overview     Mother < 60          Colonoscopy per Dr. Jani michael, sample provided          ..The risks, benefits, and alternatives of colonoscopy were reviewed with the patient today.  Risks including perforation of the colon possibly requiring surgery or colostomy.  Additional risks include risk of bleeding from biopsies or removal of colon tissue.  There is also the risk of a drug reaction or problems with anesthesia.  This will be discussed with the further by the anesthesia team on the day of the procedure.  Lastly there is a possibility of missing a colon polyp or cancer.  The benefits include the diagnosis and management of disease of the colon and rectum.  Alternatives to colonoscopy include barium enema, laboratory testing, radiographic evaluation, or no intervention.  The patient verbalizes understanding and agrees.    In accordance with requirements under the Affordable Care  Act, [facility] has provided pricing for all hospital services and items on each of its websites. However, a patient's actual cost may differ based on the services the patient receives to meet individual healthcare needs and based on the benefits provided under the patient’s insurance coverage.        Yamilex Greer, APRN  08/07/24  08:39 CDT    Part of this note may be an electronic transcription/translation of spoken language to printed text.

## 2024-08-08 ENCOUNTER — HOSPITAL ENCOUNTER (OUTPATIENT)
Dept: GENERAL RADIOLOGY | Age: 59
Discharge: HOME | End: 2024-08-08
Payer: MEDICAID

## 2024-08-08 ENCOUNTER — OFFICE VISIT (OUTPATIENT)
Dept: INTERNAL MEDICINE | Age: 59
End: 2024-08-08
Payer: MEDICAID

## 2024-08-08 VITALS
HEART RATE: 63 BPM | OXYGEN SATURATION: 98 % | BODY MASS INDEX: 27.9 KG/M2 | SYSTOLIC BLOOD PRESSURE: 122 MMHG | HEIGHT: 66 IN | DIASTOLIC BLOOD PRESSURE: 78 MMHG | WEIGHT: 173.6 LBS

## 2024-08-08 DIAGNOSIS — M25.521 RIGHT ELBOW PAIN: ICD-10-CM

## 2024-08-08 DIAGNOSIS — R22.9 SUBCUTANEOUS NODULE: Primary | ICD-10-CM

## 2024-08-08 PROCEDURE — 99213 OFFICE O/P EST LOW 20 MIN: CPT | Performed by: INTERNAL MEDICINE

## 2024-08-08 PROCEDURE — G8427 DOCREV CUR MEDS BY ELIG CLIN: HCPCS | Performed by: INTERNAL MEDICINE

## 2024-08-08 PROCEDURE — 73070 X-RAY EXAM OF ELBOW: CPT

## 2024-08-08 PROCEDURE — G8417 CALC BMI ABV UP PARAM F/U: HCPCS | Performed by: INTERNAL MEDICINE

## 2024-08-08 PROCEDURE — 3017F COLORECTAL CA SCREEN DOC REV: CPT | Performed by: INTERNAL MEDICINE

## 2024-08-08 PROCEDURE — 1036F TOBACCO NON-USER: CPT | Performed by: INTERNAL MEDICINE

## 2024-08-08 SDOH — ECONOMIC STABILITY: FOOD INSECURITY: WITHIN THE PAST 12 MONTHS, THE FOOD YOU BOUGHT JUST DIDN'T LAST AND YOU DIDN'T HAVE MONEY TO GET MORE.: NEVER TRUE

## 2024-08-08 SDOH — ECONOMIC STABILITY: FOOD INSECURITY: WITHIN THE PAST 12 MONTHS, YOU WORRIED THAT YOUR FOOD WOULD RUN OUT BEFORE YOU GOT MONEY TO BUY MORE.: NEVER TRUE

## 2024-08-08 SDOH — ECONOMIC STABILITY: INCOME INSECURITY: HOW HARD IS IT FOR YOU TO PAY FOR THE VERY BASICS LIKE FOOD, HOUSING, MEDICAL CARE, AND HEATING?: NOT HARD AT ALL

## 2024-08-08 ASSESSMENT — ENCOUNTER SYMPTOMS
SORE THROAT: 0
NAUSEA: 0
CHEST TIGHTNESS: 0
FACIAL SWELLING: 0
ABDOMINAL DISTENTION: 0
VOMITING: 0
COLOR CHANGE: 0
VOICE CHANGE: 0
ABDOMINAL PAIN: 0
RHINORRHEA: 0
EYE ITCHING: 0
CHOKING: 0
COUGH: 0
DIARRHEA: 0
SINUS PRESSURE: 0
EYE REDNESS: 0
BACK PAIN: 0
PHOTOPHOBIA: 0
SHORTNESS OF BREATH: 0
BLOOD IN STOOL: 0
EYE PAIN: 0
TROUBLE SWALLOWING: 0
EYE DISCHARGE: 0
WHEEZING: 0
RECTAL PAIN: 0
ANAL BLEEDING: 0
CONSTIPATION: 0

## 2024-08-08 ASSESSMENT — PATIENT HEALTH QUESTIONNAIRE - PHQ9
2. FEELING DOWN, DEPRESSED OR HOPELESS: NOT AT ALL
SUM OF ALL RESPONSES TO PHQ QUESTIONS 1-9: 0
SUM OF ALL RESPONSES TO PHQ QUESTIONS 1-9: 0
SUM OF ALL RESPONSES TO PHQ9 QUESTIONS 1 & 2: 0
1. LITTLE INTEREST OR PLEASURE IN DOING THINGS: NOT AT ALL
SUM OF ALL RESPONSES TO PHQ QUESTIONS 1-9: 0
SUM OF ALL RESPONSES TO PHQ QUESTIONS 1-9: 0

## 2024-08-08 NOTE — PROGRESS NOTES
Chief Complaint   Patient presents with    Other     Pt has a knot on her right arm       HPI: Kai Myers is a 58 y.o. female is here for evaluation of a nodule on her right elbow.  She states is noticed about a week ago.  She states that when it first occurred, the arm was swollen and bruised.  However, she does have a history of breast cancer.  Therefore, she is quite concerned about the spot.  She does think the spot has gotten smaller in size.  She comes in today for further evaluation.    Past Medical History:   Diagnosis Date    Cancer (HCC)     breast    History of breast cancer     Left    History of uterine leiomyoma       Past Surgical History:   Procedure Laterality Date     SECTION      CHOLECYSTECTOMY, LAPAROSCOPIC N/A 2019    CHOLECYSTECTOMY LAPAROSCOPIC performed by Glendy Oh MD at [facility] OR    EYE SURGERY  2020    HYSTERECTOMY, TOTAL ABDOMINAL (CERVIX REMOVED)  2005    MASTECTOMY Left 2014    MASTECTOMY Right       Social History     Socioeconomic History    Marital status:      Spouse name: None    Number of children: None    Years of education: None    Highest education level: None   Tobacco Use    Smoking status: Never    Smokeless tobacco: Never   Substance and Sexual Activity    Alcohol use: No     Alcohol/week: 0.0 standard drinks of alcohol    Drug use: No     Social Determinants of Health     Financial Resource Strain: Low Risk  (2023)    Overall Financial Resource Strain (CARDIA)     Difficulty of Paying Living Expenses: Not hard at all   Food Insecurity: No Food Insecurity (2023)    Hunger Vital Sign     Worried About Running Out of Food in the Last Year: Never true     Ran Out of Food in the Last Year: Never true   Transportation Needs: Unknown (2023)    PRAPARE - Transportation     Lack of Transportation (Non-Medical): No   Housing Stability: Unknown (2023)    Housing Stability Vital Sign     Unstable Housing in the Last Year:

## 2024-08-25 ENCOUNTER — OFFICE VISIT (OUTPATIENT)
Age: 59
End: 2024-08-25

## 2024-08-25 VITALS
DIASTOLIC BLOOD PRESSURE: 88 MMHG | TEMPERATURE: 97.9 F | RESPIRATION RATE: 20 BRPM | BODY MASS INDEX: 27.12 KG/M2 | WEIGHT: 168 LBS | OXYGEN SATURATION: 100 % | HEART RATE: 71 BPM | SYSTOLIC BLOOD PRESSURE: 160 MMHG

## 2024-08-25 DIAGNOSIS — U07.1 COVID-19: Primary | ICD-10-CM

## 2024-08-25 DIAGNOSIS — J39.8 CONGESTION OF UPPER RESPIRATORY TRACT: ICD-10-CM

## 2024-08-25 DIAGNOSIS — R51.9 NONINTRACTABLE HEADACHE, UNSPECIFIED CHRONICITY PATTERN, UNSPECIFIED HEADACHE TYPE: ICD-10-CM

## 2024-08-25 DIAGNOSIS — R09.82 POST-NASAL DRAINAGE: ICD-10-CM

## 2024-08-25 DIAGNOSIS — H92.02 OTALGIA, LEFT EAR: ICD-10-CM

## 2024-08-25 DIAGNOSIS — J02.9 SORE THROAT: ICD-10-CM

## 2024-08-25 LAB
Lab: ABNORMAL
QC PASS/FAIL: ABNORMAL
S PYO AG THROAT QL: NORMAL
SARS-COV-2, POC: DETECTED

## 2024-08-25 RX ORDER — FLUTICASONE PROPIONATE 50 MCG
2 SPRAY, SUSPENSION (ML) NASAL DAILY
Qty: 16 G | Refills: 0 | Status: SHIPPED | OUTPATIENT
Start: 2024-08-25

## 2024-08-25 RX ORDER — LORATADINE 10 MG/1
10 TABLET ORAL DAILY
Qty: 90 TABLET | Refills: 0 | Status: SHIPPED | OUTPATIENT
Start: 2024-08-25 | End: 2024-11-23

## 2024-09-19 ENCOUNTER — ANESTHESIA EVENT (OUTPATIENT)
Dept: GASTROENTEROLOGY | Facility: HOSPITAL | Age: 59
End: 2024-09-19
Payer: COMMERCIAL

## 2024-09-19 ENCOUNTER — TELEPHONE (OUTPATIENT)
Dept: GASTROENTEROLOGY | Facility: CLINIC | Age: 59
End: 2024-09-19
Payer: COMMERCIAL

## 2024-09-19 ENCOUNTER — HOSPITAL ENCOUNTER (OUTPATIENT)
Facility: HOSPITAL | Age: 59
Setting detail: HOSPITAL OUTPATIENT SURGERY
Discharge: HOME | End: 2024-09-19
Attending: INTERNAL MEDICINE | Admitting: INTERNAL MEDICINE
Payer: COMMERCIAL

## 2024-09-19 ENCOUNTER — ANESTHESIA (OUTPATIENT)
Dept: GASTROENTEROLOGY | Facility: HOSPITAL | Age: 59
End: 2024-09-19
Payer: COMMERCIAL

## 2024-09-19 VITALS
OXYGEN SATURATION: 100 % | SYSTOLIC BLOOD PRESSURE: 119 MMHG | BODY MASS INDEX: 25.74 KG/M2 | DIASTOLIC BLOOD PRESSURE: 65 MMHG | WEIGHT: 164 LBS | RESPIRATION RATE: 16 BRPM | HEART RATE: 58 BPM | TEMPERATURE: 97.1 F | HEIGHT: 67 IN

## 2024-09-19 DIAGNOSIS — Z80.0 FAMILY HISTORY OF COLON CANCER: ICD-10-CM

## 2024-09-19 DIAGNOSIS — Z86.010 HX OF ADENOMATOUS COLONIC POLYPS: ICD-10-CM

## 2024-09-19 PROCEDURE — 45378 DIAGNOSTIC COLONOSCOPY: CPT | Performed by: INTERNAL MEDICINE

## 2024-09-19 PROCEDURE — 25810000003 SODIUM CHLORIDE 0.9 % SOLUTION: Performed by: ANESTHESIOLOGY

## 2024-09-19 PROCEDURE — 25010000002 PROPOFOL 10 MG/ML EMULSION: Performed by: NURSE ANESTHETIST, CERTIFIED REGISTERED

## 2024-09-19 RX ORDER — PROPOFOL 10 MG/ML
VIAL (ML) INTRAVENOUS AS NEEDED
Status: DISCONTINUED | OUTPATIENT
Start: 2024-09-19 | End: 2024-09-19 | Stop reason: SURG

## 2024-09-19 RX ORDER — SODIUM CHLORIDE 9 MG/ML
500 INJECTION, SOLUTION INTRAVENOUS CONTINUOUS PRN
Status: DISCONTINUED | OUTPATIENT
Start: 2024-09-19 | End: 2024-09-19 | Stop reason: HOSPADM

## 2024-09-19 RX ORDER — SODIUM CHLORIDE 0.9 % (FLUSH) 0.9 %
10 SYRINGE (ML) INJECTION AS NEEDED
Status: DISCONTINUED | OUTPATIENT
Start: 2024-09-19 | End: 2024-09-19 | Stop reason: HOSPADM

## 2024-09-19 RX ORDER — LIDOCAINE HYDROCHLORIDE 20 MG/ML
INJECTION, SOLUTION EPIDURAL; INFILTRATION; INTRACAUDAL; PERINEURAL AS NEEDED
Status: DISCONTINUED | OUTPATIENT
Start: 2024-09-19 | End: 2024-09-19 | Stop reason: SURG

## 2024-09-19 RX ORDER — LIDOCAINE HYDROCHLORIDE 10 MG/ML
0.5 INJECTION, SOLUTION EPIDURAL; INFILTRATION; INTRACAUDAL; PERINEURAL ONCE AS NEEDED
Status: DISCONTINUED | OUTPATIENT
Start: 2024-09-19 | End: 2024-09-19 | Stop reason: HOSPADM

## 2024-09-19 RX ADMIN — SODIUM CHLORIDE 500 ML: 9 INJECTION, SOLUTION INTRAVENOUS at 09:33

## 2024-09-19 RX ADMIN — LIDOCAINE HYDROCHLORIDE 50 MG: 20 INJECTION, SOLUTION EPIDURAL; INFILTRATION; INTRACAUDAL; PERINEURAL at 10:19

## 2024-09-19 RX ADMIN — PROPOFOL 400 MG: 10 INJECTION, EMULSION INTRAVENOUS at 10:19

## 2024-09-21 DIAGNOSIS — R09.82 POST-NASAL DRAINAGE: ICD-10-CM

## 2024-09-21 RX ORDER — FLUTICASONE PROPIONATE 50 MCG
2 SPRAY, SUSPENSION (ML) NASAL DAILY
Qty: 16 G | Refills: 0 | OUTPATIENT
Start: 2024-09-21

## 2024-10-31 ENCOUNTER — NURSE ONLY (OUTPATIENT)
Dept: INTERNAL MEDICINE | Age: 59
End: 2024-10-31
Payer: MEDICAID

## 2024-10-31 DIAGNOSIS — Z23 NEED FOR VACCINATION: Primary | ICD-10-CM

## 2024-10-31 PROCEDURE — 90471 IMMUNIZATION ADMIN: CPT | Performed by: INTERNAL MEDICINE

## 2024-10-31 PROCEDURE — 90661 CCIIV3 VAC ABX FR 0.5 ML IM: CPT | Performed by: INTERNAL MEDICINE

## 2024-10-31 NOTE — PROGRESS NOTES
After obtaining consent, and per orders of Dr. Mazariegos, injection of Flucelvax given in Left deltoid by Desi Faria LPN.

## 2024-11-27 NOTE — PROGRESS NOTES
Chief Complaint   Patient presents with    Annual Exam       HPI: Marc Whitley is a 62 y.o. female is here for her annual physical exam.  Her functional status is good. She denies any history of falls. She has no concerns in regards to safety, hearing, or cognition. She does see Dr. Kaila Che for history of breast cancer    She does have a history of vitamin D deficiency and is taking vitamin D as prescribed. She is doing well from a breast cancer standpoint. She feels well and has no major concerns or complaints today. Routine screening is as follows:  Eye exam yearly  Flu vaccine is up-to-date  Pap smear: Done today. Patient is actually had a total hysterectomy.   Mammogram: Patient has had a complete bilateral mastectomy  Colonoscopy: Done in 2019  DEXA 2021, ordered    Past Medical History:   Diagnosis Date    Cancer ([address])     breast    History of breast cancer     Left    History of uterine leiomyoma       Past Surgical History:   Procedure Laterality Date    [address] N/A 9/6/2019    CHOLECYSTECTOMY LAPAROSCOPIC performed by Blanca Peña MD at [address]  03/2020    HYSTERECTOMY, TOTAL ABDOMINAL (CERVIX REMOVED)  2005    MASTECTOMY Left 2014    MASTECTOMY Right 2015      Social History     Socioeconomic History    Marital status:      Spouse name: None    Number of children: None    Years of education: None    Highest education level: None   Tobacco Use    Smoking status: Never    Smokeless tobacco: Never   Substance and Sexual Activity    Alcohol use: No     Alcohol/week: 0.0 standard drinks of alcohol    Drug use: No     Social Determinants of Health     Financial Resource Strain: Low Risk  (12/8/2023)    Overall Financial Resource Strain (CARDIA)     Difficulty of Paying Living Expenses: Not hard at all   Food Insecurity: No Food Insecurity (12/8/2023)    Hunger Vital Sign     Worried About Running Out of Food in the [facility]  [id number]  PROGRESS NOTE   Patient: Ashish Cheng  Today's Date: 11/27/2024    YOB: 1937  Admission Date: 11/23/2024    MRN: [id number]  Inpatient LOS: 3    Attending: Luan Fragoso MD  Hospital Day: Hospital Day: 5    Subjective   HISTORY AND SUBJECTIVE COMPLAINTS     Chief Complaint:   SOB- much better   Orthostatic hypotension -dizziness  Interval History / Subjective:   Evaluated this morning. No acute overnight events.   Denies fever, chills, chest pain, shortness of breath,  abdominal pain or diarrhea.  The patient supposed to be discharged started to feel dizzy after he stood up.  Positive for orthostatic hypotension  Hospital Course:  Jasiel Cheng is an 87 year old male with PMHx HFrEF, CAD s/p CABG, COPD on room air, pAF, CKD stage 3, and essential HTN who presented with worsening shortness of breath for the past week, associated with weight gain and LE edema. Reported SpO2 levels 86-88% at home. In the ED, workup showed elevated BNP >5k, stable renal function, and HS troponin of 15. CBC with Hgb 8.8. CXR showed bilateral pleural effusions. He was given IV lasix, single dose of antibiotics and steroids, and admitted for further workup with cardiology and nephrology on consult.     ROS:  Pertinent systems negative except as above.    Objective   PHYSICAL EXAMINATION     Vital 24 Hour Range Most Recent Value   Temperature Temp  Min: 97.9 °F (36.6 °C)  Max: 98.2 °F (36.8 °C) 97.9 °F (36.6 °C)   Pulse Pulse  Min: 56  Max: 62 (!) 56   Respiratory Resp  Min: 18  Max: 18 18   Blood Pressure BP  Min: 76/45  Max: 149/57 103/51   Pulse Oximetry SpO2  Min: 95 %  Max: 97 % 97 %   Arterial BP No data recorded     O2 No data recorded       Recorded Intake and Output:  Intake/Output Summary (Last 24 hours) at 11/27/2024 1431  Last data filed at 11/27/2024 1417  Gross per 24 hour   Intake 300 ml   Output 1950 ml   Net -1650 ml      Recorded Last Stool Occurrence: 1  (11/27/24 1348)     Vital Most Recent Value First Value   Weight 67 kg (147 lb 11.3 oz) Weight: 74.2 kg (163 lb 9.3 oz)   Height 5' 7\" (170.2 cm) Height: 5' 7\" (170.2 cm)   BMI 23.13 N/A     General:  No acute distress. Appears chronically ill   HEENT: Normocephalic, atraumatic, PERRL, intact extraocular movement.  Neck:  Trachea is midline.    Cardiovascular:  Regular rate and rhythm, normal S1, S2, no added sounds or murmurs.  Respiratory: No wheezing, minimal baseline crackles   Gastrointestinal:  Soft, nontender and nondistended, bowel sounds present.  Neuro:   Alert and Oriented to time, place, person. CN II-XII intact. no focal weakness or sensory deficits.  Psychiatric:   Cooperative.    Skin:  Warm and dry without rash.  Extremities: trace pitting edema of the lower extremities bilaterally, distal pulses intact.      TEST RESULTS     Labs: The Laboratory values listed below have been reviewed and pertinent findings discussed in the Assessment and Plan.    Laboratory values:   Recent Labs   Lab 11/27/24  1023 11/26/24  0608 11/25/24  1524 11/24/24  0434 11/23/24  1844   WBC  --  4.9  --  4.2 5.6   HGB 8.3* 8.7* 8.3* 8.1* 8.8*   HCT 25.8* 26.7* 25.7* 24.3* 26.1*   PLT  --  181  --  153 162         Recent Labs   Lab 11/27/24  0424 11/26/24  0404 11/25/24  0414 11/24/24  0434 11/23/24  1844   SODIUM 140 137 140   < > 144   POTASSIUM 4.2 4.2 4.1   < > 4.0   CHLORIDE 107 107 110   < > 115*   CO2 29 27 27   < > 22   CALCIUM 8.5 8.2* 8.2*   < > 8.1*   GLUCOSE 100* 116* 101*   < > 113*   BUN 31* 28* 26*   < > 24*   CREATININE 1.56* 1.49* 1.43*   < > 1.39*   MG  --   --   --   --  2.1    < > = values in this interval not displayed.        Recent Labs   Lab 11/25/24  0414 11/23/24  1844   ALBUMIN  --  3.0*   PHOS 2.7  --    AST  --  18   GPT  --  18   BILIRUBIN  --  0.5       Radiology: Imaging studies have been reviewed and pertinent findings discussed in the Assessment and Plan.  No results found for any visits on  11/23/24 (from the past 48 hour(s)).       ANCILLARY ORDERS     Diet:  Cardiac Diet  Telemetry: On  Consults:    IP CONSULT TO CARDIOLOGY  IP CONSULT TO NEPHROLOGY  IP CONSULT TO CASE MANAGEMENT  IP CONSULT TO SOCIAL WORK  Therapy Orders:   PT and OT Orders Placed this Encounter   Procedures    Occupational Therapy Evaluation    Occupational Therapy Treatment    Physical Therapy Evaluation    Physical Therapy Treatment       ADVANCED DIRECTIVES     Code Status: Full Resuscitation         ASSESSMENT AND PLAN   Acute on chronic heart failure with reduced ejection fraction improving  Orthostatic hypotension  Acute hypoxic respiratory failure-improved  History of COPD-no signs of exacerbation  Paroxysmal atrial fibrillation  Chronic kidney disease stage IIIa  Coronary artery disease status post CABG  BPH  Chronic back pain  Generalized anxiety disorder  Iron deficiency anemia    He developed hypotension after he stood up.  Held discharge.    Held diuretics  for now.  Inputs and outputs and daily weights.  Echo shows EF 58% wo major findings.  Complaining of constipation, last bowel movement last week, ordered lactulose x 1.  Labs as needed.  Cardiology/nephrology on board. Telemetry.  Orthostatic hypotension received iron sucrose, FOBT, DVT prophylaxis patient is currently on apixaban.  CODE STATUS full code.    Smoking status: Former Tobacco User    Nutrition status: Does Not Meet Criteria for Malnutrition   Body mass index is 23.13 kg/m². - Patient has an appropriate weight with BMI 18.5-24  DVT Prophylaxis: Eliquis      DISCHARGE PLANNING     The patient's treatment plans were discussed with patient and RN.    Discharge Planning    Barriers to discharge: Orthostatic hypotension  Anticipated discharge destination: Home   Expected Discharge Date: 11/28/24                Luan Mccurdy MDHospitalist

## 2024-12-10 DIAGNOSIS — R73.9 HYPERGLYCEMIA: ICD-10-CM

## 2024-12-10 DIAGNOSIS — Z00.00 ROUTINE ADULT HEALTH MAINTENANCE: ICD-10-CM

## 2024-12-10 DIAGNOSIS — E55.9 VITAMIN D DEFICIENCY: ICD-10-CM

## 2024-12-10 DIAGNOSIS — Z00.00 HEALTHCARE MAINTENANCE: ICD-10-CM

## 2024-12-10 LAB
25(OH)D3 SERPL-MCNC: 53.8 NG/ML
ALBUMIN SERPL-MCNC: 4.4 G/DL (ref 3.5–5.2)
ALP SERPL-CCNC: 197 U/L (ref 35–104)
ALT SERPL-CCNC: 46 U/L (ref 5–33)
ANION GAP SERPL CALCULATED.3IONS-SCNC: 11 MMOL/L (ref 7–19)
AST SERPL-CCNC: 36 U/L (ref 5–32)
BASOPHILS # BLD: 0 K/UL (ref 0–0.2)
BASOPHILS NFR BLD: 0.6 % (ref 0–1)
BILIRUB SERPL-MCNC: 0.3 MG/DL (ref 0.2–1.2)
BILIRUB UR QL STRIP: NEGATIVE
BUN SERPL-MCNC: 16 MG/DL (ref 6–20)
CALCIUM SERPL-MCNC: 9.6 MG/DL (ref 8.6–10)
CHLORIDE SERPL-SCNC: 100 MMOL/L (ref 98–111)
CHOLEST SERPL-MCNC: 199 MG/DL (ref 0–199)
CLARITY UR: CLEAR
CO2 SERPL-SCNC: 30 MMOL/L (ref 22–29)
COLOR UR: YELLOW
CREAT SERPL-MCNC: 0.7 MG/DL (ref 0.5–0.9)
EOSINOPHIL # BLD: 0.1 K/UL (ref 0–0.6)
EOSINOPHIL NFR BLD: 1.6 % (ref 0–5)
ERYTHROCYTE [DISTWIDTH] IN BLOOD BY AUTOMATED COUNT: 13 % (ref 11.5–14.5)
GLUCOSE SERPL-MCNC: 85 MG/DL (ref 70–99)
GLUCOSE UR STRIP.AUTO-MCNC: NEGATIVE MG/DL
HBA1C MFR BLD: 5.5 % (ref 4–5.6)
HCT VFR BLD AUTO: 44.9 % (ref 37–47)
HDLC SERPL-MCNC: 62 MG/DL (ref 40–60)
HGB BLD-MCNC: 14.2 G/DL (ref 12–16)
HGB UR STRIP.AUTO-MCNC: NEGATIVE MG/L
IMM GRANULOCYTES # BLD: 0 K/UL
KETONES UR STRIP.AUTO-MCNC: NEGATIVE MG/DL
LDLC SERPL CALC-MCNC: 121 MG/DL
LEUKOCYTE ESTERASE UR QL STRIP.AUTO: NEGATIVE
LYMPHOCYTES # BLD: 1.7 K/UL (ref 1.1–4.5)
LYMPHOCYTES NFR BLD: 24.4 % (ref 20–40)
MCH RBC QN AUTO: 29.5 PG (ref 27–31)
MCHC RBC AUTO-ENTMCNC: 31.6 G/DL (ref 33–37)
MCV RBC AUTO: 93.3 FL (ref 81–99)
MONOCYTES # BLD: 0.4 K/UL (ref 0–0.9)
MONOCYTES NFR BLD: 6.2 % (ref 0–10)
NEUTROPHILS # BLD: 4.7 K/UL (ref 1.5–7.5)
NEUTS SEG NFR BLD: 66.8 % (ref 50–65)
NITRITE UR QL STRIP.AUTO: NEGATIVE
PH UR STRIP.AUTO: 6 [PH] (ref 5–8)
PLATELET # BLD AUTO: 338 K/UL (ref 130–400)
PMV BLD AUTO: 9.6 FL (ref 9.4–12.3)
POTASSIUM SERPL-SCNC: 3.9 MMOL/L (ref 3.5–5)
PROT SERPL-MCNC: 7.4 G/DL (ref 6.4–8.3)
PROT UR STRIP.AUTO-MCNC: NEGATIVE MG/DL
RBC # BLD AUTO: 4.81 M/UL (ref 4.2–5.4)
SODIUM SERPL-SCNC: 141 MMOL/L (ref 136–145)
SP GR UR STRIP.AUTO: 1.01 (ref 1–1.03)
TRIGL SERPL-MCNC: 81 MG/DL (ref 0–149)
TSH SERPL DL<=0.005 MIU/L-ACNC: 1.08 UIU/ML (ref 0.27–4.2)
UROBILINOGEN UR STRIP.AUTO-MCNC: 0.2 E.U./DL
WBC # BLD AUTO: 7 K/UL (ref 4.8–10.8)

## 2024-12-13 ENCOUNTER — OFFICE VISIT (OUTPATIENT)
Dept: INTERNAL MEDICINE | Age: 59
End: 2024-12-13
Payer: MEDICAID

## 2024-12-13 VITALS
OXYGEN SATURATION: 95 % | HEIGHT: 67 IN | DIASTOLIC BLOOD PRESSURE: 78 MMHG | BODY MASS INDEX: 25.11 KG/M2 | WEIGHT: 160 LBS | SYSTOLIC BLOOD PRESSURE: 126 MMHG | HEART RATE: 72 BPM

## 2024-12-13 DIAGNOSIS — Z00.00 ROUTINE ADULT HEALTH MAINTENANCE: Primary | ICD-10-CM

## 2024-12-13 DIAGNOSIS — Z13.9 SCREENING DUE: ICD-10-CM

## 2024-12-13 DIAGNOSIS — E78.5 HYPERLIPIDEMIA, UNSPECIFIED HYPERLIPIDEMIA TYPE: ICD-10-CM

## 2024-12-13 DIAGNOSIS — R79.89 ELEVATED LFTS: ICD-10-CM

## 2024-12-13 DIAGNOSIS — Z91.09 ENVIRONMENTAL ALLERGIES: ICD-10-CM

## 2024-12-13 DIAGNOSIS — Z12.4 SCREENING FOR CERVICAL CANCER: ICD-10-CM

## 2024-12-13 DIAGNOSIS — E55.9 VITAMIN D DEFICIENCY: ICD-10-CM

## 2024-12-13 PROCEDURE — 99396 PREV VISIT EST AGE 40-64: CPT | Performed by: INTERNAL MEDICINE

## 2024-12-13 PROCEDURE — G8484 FLU IMMUNIZE NO ADMIN: HCPCS | Performed by: INTERNAL MEDICINE

## 2024-12-13 SDOH — ECONOMIC STABILITY: FOOD INSECURITY: WITHIN THE PAST 12 MONTHS, THE FOOD YOU BOUGHT JUST DIDN'T LAST AND YOU DIDN'T HAVE MONEY TO GET MORE.: NEVER TRUE

## 2024-12-13 SDOH — ECONOMIC STABILITY: INCOME INSECURITY: HOW HARD IS IT FOR YOU TO PAY FOR THE VERY BASICS LIKE FOOD, HOUSING, MEDICAL CARE, AND HEATING?: NOT HARD AT ALL

## 2024-12-13 SDOH — ECONOMIC STABILITY: FOOD INSECURITY: WITHIN THE PAST 12 MONTHS, YOU WORRIED THAT YOUR FOOD WOULD RUN OUT BEFORE YOU GOT MONEY TO BUY MORE.: NEVER TRUE

## 2024-12-13 ASSESSMENT — PATIENT HEALTH QUESTIONNAIRE - PHQ9
SUM OF ALL RESPONSES TO PHQ QUESTIONS 1-9: 0
1. LITTLE INTEREST OR PLEASURE IN DOING THINGS: NOT AT ALL
SUM OF ALL RESPONSES TO PHQ QUESTIONS 1-9: 0
SUM OF ALL RESPONSES TO PHQ QUESTIONS 1-9: 0
2. FEELING DOWN, DEPRESSED OR HOPELESS: NOT AT ALL
SUM OF ALL RESPONSES TO PHQ9 QUESTIONS 1 & 2: 0
SUM OF ALL RESPONSES TO PHQ QUESTIONS 1-9: 0

## 2024-12-13 ASSESSMENT — ENCOUNTER SYMPTOMS
EYE PAIN: 0
VOICE CHANGE: 0
SHORTNESS OF BREATH: 0
CHEST TIGHTNESS: 0
WHEEZING: 0
RECTAL PAIN: 0
COLOR CHANGE: 0
BACK PAIN: 0
CHOKING: 0
ANAL BLEEDING: 0
COUGH: 0
EYE ITCHING: 0
VOMITING: 0
DIARRHEA: 0
RHINORRHEA: 0
NAUSEA: 0
EYE DISCHARGE: 0
ABDOMINAL PAIN: 0
SORE THROAT: 0
ABDOMINAL DISTENTION: 0
CONSTIPATION: 0
PHOTOPHOBIA: 0
TROUBLE SWALLOWING: 0
EYE REDNESS: 0
SINUS PRESSURE: 0
BLOOD IN STOOL: 0
FACIAL SWELLING: 0

## 2024-12-13 NOTE — PROGRESS NOTES
Routine adult health maintenance    2. Vitamin D deficiency    3. Screening due    4. Elevated LFTs    5. Hyperlipidemia, unspecified hyperlipidemia type    6. Screening for cervical cancer    7. Environmental allergies        ASSESSMENT/PLAN:    59-year-old woman here for follow-up    1.  Health maintenance: Routine screening is as per HPI.  Labs discussed with patient    2.  Fatty liver disease: Check a CMP in 3 months    3.  Hyperlipidemia: Work on diet and exercise.  Check lipid panel in 3 months    4.  Environmental allergies: Continue Flonase    5.  Vitamin D deficiency: Continue cholecalciferol as prescribed    4.  History of breast cancer: Followed by Dr. Cheryl Quinn was seen today for annual exam.    Diagnoses and all orders for this visit:    Routine adult health maintenance    Vitamin D deficiency  -     Vitamin D 25 Hydroxy; Future    Screening due  -     Hepatitis C Antibody; Future    Elevated LFTs  -     Comprehensive Metabolic Panel; Future  -     Comprehensive Metabolic Panel; Future  -     Lipid Panel; Future    Hyperlipidemia, unspecified hyperlipidemia type  -     Hepatitis C Antibody; Future  -     TSH; Future  -     Lipid Panel; Future  -     Comprehensive Metabolic Panel; Future  -     CBC with Auto Differential; Future  -     Hemoglobin A1C; Future  -     Comprehensive Metabolic Panel; Future  -     Lipid Panel; Future    Screening for cervical cancer  -     PAP SMEAR    Environmental allergies          Return in about 1 year (around 12/13/2025) for physical.     Orders Placed This Encounter   Procedures    Vitamin D 25 Hydroxy     Standing Status:   Future     Standing Expiration Date:   12/13/2025    Hepatitis C Antibody     Standing Status:   Future     Standing Expiration Date:   12/13/2025    TSH     Standing Status:   Future     Standing Expiration Date:   12/13/2025    Lipid Panel     Standing Status:   Future     Standing Expiration Date:   12/13/2025    Comprehensive

## 2024-12-19 RX ORDER — AZITHROMYCIN 250 MG/1
TABLET, FILM COATED ORAL
Qty: 6 TABLET | Refills: 0 | Status: SHIPPED | OUTPATIENT
Start: 2024-12-19 | End: 2024-12-29

## 2024-12-19 RX ORDER — METHYLPREDNISOLONE 4 MG/1
TABLET ORAL
Qty: 1 KIT | Refills: 0 | Status: SHIPPED | OUTPATIENT
Start: 2024-12-19 | End: 2024-12-25

## 2025-01-02 ENCOUNTER — LAB (OUTPATIENT)
Dept: LAB | Facility: HOSPITAL | Age: 60
End: 2025-01-02
Payer: COMMERCIAL

## 2025-01-02 DIAGNOSIS — D05.12 DUCTAL CARCINOMA IN SITU OF LEFT BREAST: ICD-10-CM

## 2025-01-02 LAB
ALBUMIN SERPL-MCNC: 4 G/DL (ref 3.5–5.2)
ALBUMIN/GLOB SERPL: 1.7 G/DL
ALP SERPL-CCNC: 155 U/L (ref 39–117)
ALT SERPL W P-5'-P-CCNC: 56 U/L (ref 1–33)
ANION GAP SERPL CALCULATED.3IONS-SCNC: 7 MMOL/L (ref 5–15)
AST SERPL-CCNC: 49 U/L (ref 1–32)
BASOPHILS # BLD AUTO: 0.05 10*3/MM3 (ref 0–0.2)
BASOPHILS NFR BLD AUTO: 0.8 % (ref 0–1.5)
BILIRUB SERPL-MCNC: 0.4 MG/DL (ref 0–1.2)
BUN SERPL-MCNC: 10 MG/DL (ref 6–20)
BUN/CREAT SERPL: 16.4 (ref 7–25)
CALCIUM SPEC-SCNC: 9 MG/DL (ref 8.6–10.5)
CHLORIDE SERPL-SCNC: 106 MMOL/L (ref 98–107)
CO2 SERPL-SCNC: 29 MMOL/L (ref 22–29)
CREAT SERPL-MCNC: 0.61 MG/DL (ref 0.57–1)
DEPRECATED RDW RBC AUTO: 45.7 FL (ref 37–54)
EGFRCR SERPLBLD CKD-EPI 2021: 103.1 ML/MIN/1.73
EOSINOPHIL # BLD AUTO: 0.15 10*3/MM3 (ref 0–0.4)
EOSINOPHIL NFR BLD AUTO: 2.4 % (ref 0.3–6.2)
ERYTHROCYTE [DISTWIDTH] IN BLOOD BY AUTOMATED COUNT: 13.5 % (ref 12.3–15.4)
GLOBULIN UR ELPH-MCNC: 2.4 GM/DL
GLUCOSE SERPL-MCNC: 90 MG/DL (ref 65–99)
HCT VFR BLD AUTO: 40.7 % (ref 34–46.6)
HGB BLD-MCNC: 12.7 G/DL (ref 12–15.9)
IMM GRANULOCYTES # BLD AUTO: 0.02 10*3/MM3 (ref 0–0.05)
IMM GRANULOCYTES NFR BLD AUTO: 0.3 % (ref 0–0.5)
LYMPHOCYTES # BLD AUTO: 1.43 10*3/MM3 (ref 0.7–3.1)
LYMPHOCYTES NFR BLD AUTO: 22.8 % (ref 19.6–45.3)
MCH RBC QN AUTO: 28.5 PG (ref 26.6–33)
MCHC RBC AUTO-ENTMCNC: 31.2 G/DL (ref 31.5–35.7)
MCV RBC AUTO: 91.5 FL (ref 79–97)
MONOCYTES # BLD AUTO: 0.41 10*3/MM3 (ref 0.1–0.9)
MONOCYTES NFR BLD AUTO: 6.5 % (ref 5–12)
NEUTROPHILS NFR BLD AUTO: 4.22 10*3/MM3 (ref 1.7–7)
NEUTROPHILS NFR BLD AUTO: 67.2 % (ref 42.7–76)
NRBC BLD AUTO-RTO: 0 /100 WBC (ref 0–0.2)
PLATELET # BLD AUTO: 226 10*3/MM3 (ref 140–450)
PMV BLD AUTO: 9.5 FL (ref 6–12)
POTASSIUM SERPL-SCNC: 4 MMOL/L (ref 3.5–5.2)
PROT SERPL-MCNC: 6.4 G/DL (ref 6–8.5)
RBC # BLD AUTO: 4.45 10*6/MM3 (ref 3.77–5.28)
SODIUM SERPL-SCNC: 142 MMOL/L (ref 136–145)
WBC NRBC COR # BLD AUTO: 6.28 10*3/MM3 (ref 3.4–10.8)

## 2025-01-02 PROCEDURE — 80053 COMPREHEN METABOLIC PANEL: CPT

## 2025-01-02 PROCEDURE — 85025 COMPLETE CBC W/AUTO DIFF WBC: CPT

## 2025-01-02 PROCEDURE — 36415 COLL VENOUS BLD VENIPUNCTURE: CPT

## 2025-01-28 NOTE — PROGRESS NOTES
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  She had a routine mammogram October 2014 finding calcifications in the upper outer quadrant of the left breast.  She was referred to Dr. Funes and stereotactic biopsy was performed on November 3, 2014.  The biopsy was consistent with ductal carcinoma in situ, high-grade.  The area measured 1.2 cm.  It was ER positive 99% [place] positive 94% HER-2/davi was equivocal by FISH.  She returned to the operating room on November 17, 2014 and had a left breast simple mastectomy showing ductal carcinoma in situ grade 2.  There was no invasive carcinoma noted.  Margins of the mastectomy were free of involvement.  Her AJCC TNM stage pTis,NX,MX, stage 0.  She was placed on Arimidex therapy in December 2014 through sometime 2015.     Patient went on to have a prophylactic right mastectomy in 2015 per Dr Huff.  There was no malignancy in this breast.  She then stopped the Arimidex therapy.        I have reviewed the HPI and verified with the patient the accuracy of it. No changes to interval history since the information was documented. Arya Haddad MD 02/04/25      PAST MEDICAL HISTORY:  ALLERGIES:  No Known Allergies    CURRENT MEDICATIONS:  Outpatient Encounter Medications as of 2/4/2025   Medication Sig Dispense Refill    calcium citrate-vitamin d (CALCITRATE) 315-250 MG-UNIT tablet tablet Take 1 tablet by mouth Daily.      Multiple Vitamins-Minerals (PRESERVISION AREDS 2 PO) Take 1 tablet by mouth 2 (Two) Times a Day.      multivitamin  with minerals (MULTIVITAL PO) Take 1 tablet by mouth Daily.      vitamin C (ASCORBIC ACID) 250 MG tablet Take 1 tablet by mouth Daily.       No facility-administered encounter medications on file as of 2025.     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     SURGERIES:  Past Surgical History:   Procedure Laterality Date    BREAST BIOPSY Right     CATARACT EXTRACTION, BILATERAL       SECTION      CHOLECYSTECTOMY  2019    COLONOSCOPY  2015    One 4mm tubular adenomatous polyp in transverse colon; Repeat 3 years    COLONOSCOPY N/A 2019    One 6mm adenomatous polyp in the ascending colon; One 7mm hyperplastic polyp in the transverse colon; The examination was otherwise normal on direct and retroflexion views; Repeat 5 years    COLONOSCOPY N/A 2024    Procedure: COLONOSCOPY WITH ANESTHESIA;  Surgeon: Lamar Gunter MD;  Location: [facility] ENDOSCOPY;  Service: Gastroenterology;  Laterality: N/A;  pre Hx of adenomatous colonic polyps; Family history of colon cancer  post normal  pcp Shweta Sawant MD    HYSTERECTOMY      MASTECTOMY Left 2014    right removed      HEALTH MAINTENANCE ITEMS:    Last Completed Colonoscopy         Status Date      COLONOSCOPY Done 2019 COLONOSCOPY     Benign polyp removed            FAMILY HISTORY:  Family History   Problem Relation Age of Onset    Colon polyps Mother         < 60 years of age    No Known Problems Father     No Known Problems Sister     No Known Problems Brother     Uterine cancer Maternal Grandmother     Colon cancer Maternal Grandmother         In her 60's or 70's    No Known Problems Paternal Grandmother     No Known Problems Paternal Grandfather     No Known Problems Daughter     No Known  "Problems Son     Esophageal cancer Neg Hx     Liver cancer Neg Hx     Stomach cancer Neg Hx     Liver disease Neg Hx     Rectal cancer Neg Hx      SOCIAL HISTORY:  Social History     Socioeconomic History    Marital status:    Tobacco Use    Smoking status: Never    Smokeless tobacco: Never   Vaping Use    Vaping status: Never Used   Substance and Sexual Activity    Alcohol use: No    Drug use: No    Sexual activity: Defer       REVIEW OF SYSTEMS:  Review of Systems   Constitutional:  Negative for activity change, appetite change, chills, diaphoresis, fatigue, fever and unexpected weight loss.        Manages her ADLs to include chores errands and driving.  Is up and about, \"all the time.\"     HENT:  Negative for nosebleeds, sinus pressure, sore throat and voice change.    Eyes:  Negative for blurred vision, double vision and visual disturbance.        She has had some problems with her vision, cataracts and macular degeneration and it was felt to be related to the Arimidex therapy.  For which she had previously discontinued.  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  Receives intraocular injections.,  \"They work\"     Respiratory:  Negative for cough and shortness of breath.    Cardiovascular:  Negative for chest pain, palpitations and leg swelling.   Gastrointestinal:  Negative for abdominal pain, anal bleeding, blood in stool, constipation, diarrhea, nausea and vomiting.   Genitourinary:  Negative for dysuria, frequency, hematuria, urgency and urinary incontinence.   Musculoskeletal:  Negative for arthralgias and myalgias.   Skin:  Negative for rash and skin lesions.   Neurological:  Negative for dizziness, weakness and headache.   Hematological:  Negative for adenopathy. Does not bruise/bleed easily.   Psychiatric/Behavioral:  Negative for sleep disturbance and depressed mood.        /82   Pulse 65   Temp 97.7 °F (36.5 °C) (Temporal)  " " Resp 18   Ht 168.9 cm (66.5\")   Wt 76.3 kg (168 lb 4.8 oz)   LMP  (LMP Unknown)   SpO2 98%   BMI 26.76 kg/m²  Body surface area is 1.87 meters squared.  Pain Score    02/04/25 0843   PainSc: 0-No pain         Physical Exam  Vitals and nursing note reviewed. Exam conducted with a chaperone present.   Constitutional:       Appearance: Normal appearance. She is well-developed.      Comments: Pleasant, cooperative, obese middle-aged female.  Ambulatory.  ECOG 0    Has intentionally lost 44 lb (in addition to 25 lb her prior visit.  Had previously gained 55 lb due to calories > expenditure.  \"I am finally taking it off\")  Says she is exercising by walking daily and has cut back portions.     HENT:      Head: Atraumatic.   Eyes:      General: No scleral icterus.     Pupils: Pupils are equal, round, and reactive to light.   Neck:      Trachea: Trachea normal.   Cardiovascular:      Rate and Rhythm: Normal rate and regular rhythm.   Pulmonary:      Effort: Pulmonary effort is normal.      Breath sounds: Normal breath sounds. No wheezing, rhonchi or rales.   Chest:   Breasts:     Right: Absent.      Left: Absent.          Comments: Chaperoned exam: Radha Greene MA     Again note that mastectomy incisions are healed with some scar thickening but no nodularity with no other palpable abnormality of the chest wall    No axillary nor supraclavicular adenopathy bilaterally  Abdominal:      Palpations: Abdomen is soft.      Tenderness: There is no abdominal tenderness. There is no guarding or rebound.   Musculoskeletal:      Cervical back: Neck supple.   Skin:     General: Skin is warm and dry.   Neurological:      General: No focal deficit present.      Mental Status: She is alert and oriented to person, place, and time.   Psychiatric:         Mood and Affect: Mood normal.         Behavior: Behavior normal.             LABS    Lab Results - Last 18 Months   Lab Units 01/02/25  0748 12/10/24  1141 12/18/23  0817 " 11/29/23  0901   HEMOGLOBIN g/dL 12.7 14.2 14.4 14.1   HEMATOCRIT % 40.7 44.9 45.1 44.6   MCV fL 91.5 93.3 87.4 90.3   WBC 10*3/mm3 6.28 7.0 7.17 6.9   RDW % 13.5 13.0 14.4 14.3   MPV fL 9.5 9.6 10.4 10.9   PLATELETS 10*3/mm3 226 338 317 308   IMM GRAN % % 0.3  --  0.4  --    NEUTROS ABS 10*3/mm3 4.22 4.7 5.13 4.9   LYMPHS ABS 10*3/mm3 1.43 1.7 1.38 1.5   MONOS ABS 10*3/mm3 0.41 0.40 0.47 0.50   EOS ABS 10*3/mm3 0.15 0.10 0.13 0.10   BASOS ABS 10*3/mm3 0.05 0.00 0.03 0.00   IMMATURE GRANS (ABS) 10*3/mm3 0.02 0.0 0.03 0.0   NRBC /100 WBC 0.0  --  0.0  --        Lab Results - Last 18 Months   Lab Units 01/02/25  0748 12/10/24  1141 12/18/23  0817   GLUCOSE mg/dL 90 85 96   SODIUM mmol/L 142 141 140   POTASSIUM mmol/L 4.0 3.9 3.8   TOTAL CO2 mmol/L  --  30*  --    CO2 mmol/L 29.0  --  27.0   CHLORIDE mmol/L 106 100 103   ANION GAP mmol/L 7.0 11 10.0   CREATININE mg/dL 0.61 0.7 0.81   BUN mg/dL 10 16 7   BUN / CREAT RATIO  16.4  --  8.6   CALCIUM mg/dL 9.0 9.6 9.3   ALK PHOS U/L 155* 197* 171*   TOTAL PROTEIN g/dL 6.4 7.4 7.1   ALT (SGPT) U/L 56* 46* 21   AST (SGOT) U/L 49* 36* 22   BILIRUBIN mg/dL 0.4 0.3 0.2   ALBUMIN g/dL 4.0 4.4 4.0   GLOBULIN gm/dL 2.4  --  3.1       Lab Results - Last 18 Months   Lab Units 12/10/24  1141 11/29/23  0901   TSH uIU/mL 1.080 1.590     ASSESSMENT  1.  Left Breast DCIS diagnosed in October 2014  Initial stage: AJCC TNM stage pTis,NX,MX, stage 0. ER positive at 99% ME positive at 94% HER-2/davi was equivocal by FISH.    Treatment: Left breast simple mastectomy November 17, 2014, Arimidex started December 2014; prophylactic right mastectomy in 2015 and a REM attacks, Arimidex was then stopped by the physician.  Disease status: JOHN  2.  Stress incontinence controlled with Ditropan  3.  Chronically elevated alkaline phosphatase.  It was initially elevated after gallbladder attack and removal.    -- 10/04/2021-ultrasound liver-normal liver.  Gallbladder not demonstrated consistent with  surgical history.  Mild right hydronephrosis.  Recommend CT.  No bile duct dilatation.  -- 10/12/2021-CT abdomen/pelvis- area of peripheral enhancement in the liver suggestive of an underlying lesion.  Follow-up MRI abdomen.  Cystitis.  Nonpathologically enlarged pelvic lymph nodes.  Reactive?  -- 10/26/2021-MRI liver-no evidence of metastatic disease in the abdomen.  1.4 cm hemangioma right posterior liver correlating with the lesion of concern identified on prior CT abdomen.  --9/17/23- Liver US-Diffusely fatty liver, as above, correlate as to steatohepatitis.  Status post prior cholecystectomy, without evidence of abnormal biliary tree dilatation.   --1/2/25- a phos 155/ALT 56/AST 49 (prior peak: 246/ALT peak: 330/ AST peak: 277)    4.   Weight gain.  Now with intentional weight loss from portion control and daily exercise (walking)    PLAN  1.  Apprised of labs, 1/2/25-alk phos 155 (peak: 246), ALT 56 (peak: 300), AST 49 (prior peak: 277) otherwise normal CMP. Normal CBC  2.  Previously apprised of liver ultrasound, 9/17/23 (above).  Fatty liver  3.  Encourage patient to continue follow primary care provider and other specialists  4.  Weight management again discussed.  Applaud her efforts  5.  Return in 1 year for follow-up with a pre-office CBC CMP    I spent ~32 minutes caring for Madhav on this date of service. This time includes time spent by me in the following activities: preparing for the visit, reviewing tests, performing a medically appropriate examination and/or evaluation, counseling and educating the patient/family/caregiver, ordering medications, tests, or procedures and documenting information in the medical record.

## 2025-02-04 ENCOUNTER — OFFICE VISIT (OUTPATIENT)
Dept: ONCOLOGY | Facility: CLINIC | Age: 60
End: 2025-02-04
Payer: COMMERCIAL

## 2025-02-04 VITALS
DIASTOLIC BLOOD PRESSURE: 82 MMHG | SYSTOLIC BLOOD PRESSURE: 118 MMHG | BODY MASS INDEX: 26.42 KG/M2 | TEMPERATURE: 97.7 F | RESPIRATION RATE: 18 BRPM | OXYGEN SATURATION: 98 % | HEART RATE: 65 BPM | HEIGHT: 67 IN | WEIGHT: 168.3 LBS

## 2025-02-04 DIAGNOSIS — D05.12 DUCTAL CARCINOMA IN SITU OF LEFT BREAST: Primary | ICD-10-CM

## 2025-07-15 ENCOUNTER — OFFICE VISIT (OUTPATIENT)
Dept: INTERNAL MEDICINE | Age: 60
End: 2025-07-15
Payer: MEDICAID

## 2025-07-15 VITALS
WEIGHT: 185.2 LBS | HEART RATE: 87 BPM | HEIGHT: 67 IN | BODY MASS INDEX: 29.07 KG/M2 | DIASTOLIC BLOOD PRESSURE: 84 MMHG | SYSTOLIC BLOOD PRESSURE: 135 MMHG | OXYGEN SATURATION: 99 %

## 2025-07-15 DIAGNOSIS — R10.84 ABDOMINAL PAIN, GENERALIZED: ICD-10-CM

## 2025-07-15 DIAGNOSIS — R10.84 ABDOMINAL PAIN, GENERALIZED: Primary | ICD-10-CM

## 2025-07-15 LAB
ALBUMIN SERPL-MCNC: 4.3 G/DL (ref 3.5–5.2)
ALP SERPL-CCNC: 318 U/L (ref 35–104)
ALT SERPL-CCNC: 20 U/L (ref 10–35)
ANION GAP SERPL CALCULATED.3IONS-SCNC: 10 MMOL/L (ref 8–16)
AST SERPL-CCNC: 27 U/L (ref 10–35)
BASOPHILS # BLD: 0 K/UL (ref 0–0.2)
BASOPHILS NFR BLD: 0.5 % (ref 0–1)
BILIRUB SERPL-MCNC: 0.4 MG/DL (ref 0.2–1.2)
BUN SERPL-MCNC: 8 MG/DL (ref 6–20)
CALCIUM SERPL-MCNC: 10.2 MG/DL (ref 8.6–10)
CHLORIDE SERPL-SCNC: 100 MMOL/L (ref 98–107)
CO2 SERPL-SCNC: 30 MMOL/L (ref 22–29)
CREAT SERPL-MCNC: 0.7 MG/DL (ref 0.5–0.9)
EOSINOPHIL # BLD: 0.1 K/UL (ref 0–0.6)
EOSINOPHIL NFR BLD: 0.9 % (ref 0–5)
ERYTHROCYTE [DISTWIDTH] IN BLOOD BY AUTOMATED COUNT: 12.4 % (ref 11.5–14.5)
GLUCOSE SERPL-MCNC: 101 MG/DL (ref 70–99)
HCT VFR BLD AUTO: 45.3 % (ref 37–47)
HGB BLD-MCNC: 15 G/DL (ref 12–16)
IMM GRANULOCYTES # BLD: 0 K/UL
LIPASE SERPL-CCNC: 40 U/L (ref 13–60)
LYMPHOCYTES # BLD: 1.1 K/UL (ref 1.1–4.5)
LYMPHOCYTES NFR BLD: 12.4 % (ref 20–40)
MCH RBC QN AUTO: 28.7 PG (ref 27–31)
MCHC RBC AUTO-ENTMCNC: 33.1 G/DL (ref 33–37)
MCV RBC AUTO: 86.6 FL (ref 81–99)
MONOCYTES # BLD: 0.6 K/UL (ref 0–0.9)
MONOCYTES NFR BLD: 6.4 % (ref 0–10)
NEUTROPHILS # BLD: 6.9 K/UL (ref 1.5–7.5)
NEUTS SEG NFR BLD: 79.3 % (ref 50–65)
PLATELET # BLD AUTO: 349 K/UL (ref 130–400)
PMV BLD AUTO: 9.2 FL (ref 9.4–12.3)
POTASSIUM SERPL-SCNC: 3.9 MMOL/L (ref 3.5–5.1)
PROT SERPL-MCNC: 7.2 G/DL (ref 6.4–8.3)
RBC # BLD AUTO: 5.23 M/UL (ref 4.2–5.4)
SODIUM SERPL-SCNC: 140 MMOL/L (ref 136–145)
WBC # BLD AUTO: 8.7 K/UL (ref 4.8–10.8)

## 2025-07-15 PROCEDURE — G8417 CALC BMI ABV UP PARAM F/U: HCPCS | Performed by: INTERNAL MEDICINE

## 2025-07-15 PROCEDURE — 99214 OFFICE O/P EST MOD 30 MIN: CPT | Performed by: INTERNAL MEDICINE

## 2025-07-15 PROCEDURE — G8427 DOCREV CUR MEDS BY ELIG CLIN: HCPCS | Performed by: INTERNAL MEDICINE

## 2025-07-15 PROCEDURE — 1036F TOBACCO NON-USER: CPT | Performed by: INTERNAL MEDICINE

## 2025-07-15 PROCEDURE — 3017F COLORECTAL CA SCREEN DOC REV: CPT | Performed by: INTERNAL MEDICINE

## 2025-07-15 RX ORDER — OMEPRAZOLE 20 MG/1
20 CAPSULE, DELAYED RELEASE ORAL
Qty: 30 CAPSULE | Refills: 0 | Status: SHIPPED | OUTPATIENT
Start: 2025-07-15

## 2025-07-15 SDOH — ECONOMIC STABILITY: FOOD INSECURITY: WITHIN THE PAST 12 MONTHS, YOU WORRIED THAT YOUR FOOD WOULD RUN OUT BEFORE YOU GOT MONEY TO BUY MORE.: NEVER TRUE

## 2025-07-15 SDOH — ECONOMIC STABILITY: FOOD INSECURITY: WITHIN THE PAST 12 MONTHS, THE FOOD YOU BOUGHT JUST DIDN'T LAST AND YOU DIDN'T HAVE MONEY TO GET MORE.: NEVER TRUE

## 2025-07-15 ASSESSMENT — PATIENT HEALTH QUESTIONNAIRE - PHQ9
SUM OF ALL RESPONSES TO PHQ QUESTIONS 1-9: 0
2. FEELING DOWN, DEPRESSED OR HOPELESS: NOT AT ALL
SUM OF ALL RESPONSES TO PHQ QUESTIONS 1-9: 0
SUM OF ALL RESPONSES TO PHQ QUESTIONS 1-9: 0
1. LITTLE INTEREST OR PLEASURE IN DOING THINGS: NOT AT ALL
SUM OF ALL RESPONSES TO PHQ QUESTIONS 1-9: 0

## 2025-07-15 NOTE — PROGRESS NOTES
Chief Complaint   Patient presents with    Pain     Right side pain, not constant pt worried about it being appendicitis , cramp feeling across the top of her stomach        HPI: Kai Myers is a 59 y.o. female is here for evaluation of a several month history of right lower quadrant pain.  She also has pain across the upper part of her abdomen.  She denies any complaints of fever or chills.  The pain has been ongoing since February.  She has not had any blood in her stools.  She has had a history of breast cancer.  Therefore, she is also concerned about the reoccurrence of cancer.  She is also concerned that she could possibly have appendicitis.  She denies any difficulty with her bowel movements at this time.    She declines a pneumonia vaccine today.    Past Medical History:   Diagnosis Date    Cancer (HCC)     breast    History of breast cancer     Left    History of uterine leiomyoma       Past Surgical History:   Procedure Laterality Date     SECTION      CHOLECYSTECTOMY, LAPAROSCOPIC N/A 2019    CHOLECYSTECTOMY LAPAROSCOPIC performed by Glendy Oh MD at [facility] OR    EYE SURGERY  2020    HYSTERECTOMY, TOTAL ABDOMINAL (CERVIX REMOVED)  2005    MASTECTOMY Left 2014    MASTECTOMY Right       Social History     Socioeconomic History    Marital status:    Tobacco Use    Smoking status: Never    Smokeless tobacco: Never   Vaping Use    Vaping status: Never Used   Substance and Sexual Activity    Alcohol use: No     Alcohol/week: 0.0 standard drinks of alcohol    Drug use: No     Social Drivers of Health     Financial Resource Strain: Low Risk  (2024)    Overall Financial Resource Strain (CARDIA)     Difficulty of Paying Living Expenses: Not hard at all   Food Insecurity: No Food Insecurity (7/15/2025)    Hunger Vital Sign     Worried About Running Out of Food in the Last Year: Never true     Ran Out of Food in the Last Year: Never true   Transportation Needs: No

## 2025-07-16 ASSESSMENT — ENCOUNTER SYMPTOMS
RECTAL PAIN: 0
SHORTNESS OF BREATH: 0
DIARRHEA: 0
SORE THROAT: 0
CHOKING: 0
RHINORRHEA: 0
ANAL BLEEDING: 0
BACK PAIN: 0
ABDOMINAL PAIN: 1
CONSTIPATION: 0
WHEEZING: 0
VOMITING: 0
TROUBLE SWALLOWING: 0
SINUS PRESSURE: 0
EYE DISCHARGE: 0
PHOTOPHOBIA: 0
VOICE CHANGE: 0
EYE PAIN: 0
EYE REDNESS: 0
NAUSEA: 0
EYE ITCHING: 0
CHEST TIGHTNESS: 0
COLOR CHANGE: 0
ABDOMINAL DISTENTION: 0
BLOOD IN STOOL: 0
COUGH: 0
FACIAL SWELLING: 0

## 2025-07-23 ENCOUNTER — HOSPITAL ENCOUNTER (OUTPATIENT)
Dept: CT IMAGING | Age: 60
Discharge: HOME | End: 2025-07-23
Payer: MEDICAID

## 2025-07-23 DIAGNOSIS — R10.84 ABDOMINAL PAIN, GENERALIZED: ICD-10-CM

## 2025-07-23 PROCEDURE — 74178 CT ABD&PLV WO CNTR FLWD CNTR: CPT

## 2025-07-23 PROCEDURE — 6360000004 HC RX CONTRAST MEDICATION: Performed by: INTERNAL MEDICINE

## 2025-07-23 RX ORDER — IOPAMIDOL 755 MG/ML
75 INJECTION, SOLUTION INTRAVASCULAR
Status: COMPLETED | OUTPATIENT
Start: 2025-07-23 | End: 2025-07-23

## 2025-07-23 RX ADMIN — IOPAMIDOL 75 ML: 755 INJECTION, SOLUTION INTRAVENOUS at 11:29

## 2025-07-24 ENCOUNTER — RESULTS FOLLOW-UP (OUTPATIENT)
Dept: ONCOLOGY | Facility: CLINIC | Age: 60
End: 2025-07-24
Payer: COMMERCIAL

## 2025-07-24 ENCOUNTER — HOSPITAL ENCOUNTER (OUTPATIENT)
Dept: CT IMAGING | Age: 60
Discharge: HOME | End: 2025-07-24
Payer: MEDICAID

## 2025-07-24 ENCOUNTER — OFFICE VISIT (OUTPATIENT)
Dept: INTERNAL MEDICINE | Age: 60
End: 2025-07-24
Payer: MEDICAID

## 2025-07-24 VITALS
HEART RATE: 85 BPM | SYSTOLIC BLOOD PRESSURE: 145 MMHG | DIASTOLIC BLOOD PRESSURE: 98 MMHG | OXYGEN SATURATION: 97 % | WEIGHT: 184.8 LBS | HEIGHT: 67 IN | BODY MASS INDEX: 29 KG/M2

## 2025-07-24 DIAGNOSIS — C50.919 MALIGNANT NEOPLASM OF FEMALE BREAST, UNSPECIFIED ESTROGEN RECEPTOR STATUS, UNSPECIFIED LATERALITY, UNSPECIFIED SITE OF BREAST (HCC): ICD-10-CM

## 2025-07-24 DIAGNOSIS — N20.0 RENAL STONE: ICD-10-CM

## 2025-07-24 DIAGNOSIS — R93.5 ABNORMAL CT OF THE ABDOMEN: Primary | ICD-10-CM

## 2025-07-24 DIAGNOSIS — R91.8 LUNG NODULES: ICD-10-CM

## 2025-07-24 DIAGNOSIS — R16.0 LIVER MASS: ICD-10-CM

## 2025-07-24 DIAGNOSIS — F41.9 ANXIETY DISORDER, UNSPECIFIED TYPE: ICD-10-CM

## 2025-07-24 DIAGNOSIS — D05.12 DUCTAL CARCINOMA IN SITU OF LEFT BREAST: Primary | ICD-10-CM

## 2025-07-24 PROCEDURE — 6360000004 HC RX CONTRAST MEDICATION: Performed by: INTERNAL MEDICINE

## 2025-07-24 PROCEDURE — 71270 CT THORAX DX C-/C+: CPT

## 2025-07-24 PROCEDURE — 3017F COLORECTAL CA SCREEN DOC REV: CPT | Performed by: INTERNAL MEDICINE

## 2025-07-24 PROCEDURE — G8427 DOCREV CUR MEDS BY ELIG CLIN: HCPCS | Performed by: INTERNAL MEDICINE

## 2025-07-24 PROCEDURE — 99214 OFFICE O/P EST MOD 30 MIN: CPT | Performed by: INTERNAL MEDICINE

## 2025-07-24 PROCEDURE — G8417 CALC BMI ABV UP PARAM F/U: HCPCS | Performed by: INTERNAL MEDICINE

## 2025-07-24 PROCEDURE — 1036F TOBACCO NON-USER: CPT | Performed by: INTERNAL MEDICINE

## 2025-07-24 RX ORDER — BUSPIRONE HYDROCHLORIDE 5 MG/1
5 TABLET ORAL 3 TIMES DAILY
Qty: 30 TABLET | Refills: 0 | Status: SHIPPED | OUTPATIENT
Start: 2025-07-24 | End: 2025-08-03

## 2025-07-24 RX ORDER — HYDROCODONE BITARTRATE AND ACETAMINOPHEN 5; 325 MG/1; MG/1
1 TABLET ORAL EVERY 6 HOURS PRN
Qty: 10 TABLET | Refills: 0 | Status: SHIPPED | OUTPATIENT
Start: 2025-07-24 | End: 2025-07-27

## 2025-07-24 RX ORDER — IOPAMIDOL 755 MG/ML
75 INJECTION, SOLUTION INTRAVASCULAR
Status: COMPLETED | OUTPATIENT
Start: 2025-07-24 | End: 2025-07-24

## 2025-07-24 RX ORDER — TAMSULOSIN HYDROCHLORIDE 0.4 MG/1
0.4 CAPSULE ORAL DAILY
Qty: 30 CAPSULE | Refills: 5 | Status: SHIPPED | OUTPATIENT
Start: 2025-07-24

## 2025-07-24 RX ADMIN — IOPAMIDOL 75 ML: 755 INJECTION, SOLUTION INTRAVENOUS at 08:46

## 2025-07-24 ASSESSMENT — ENCOUNTER SYMPTOMS
BLOOD IN STOOL: 0
NAUSEA: 0
BACK PAIN: 0
ABDOMINAL PAIN: 1
EYE PAIN: 0
TROUBLE SWALLOWING: 0
SINUS PRESSURE: 0
EYE DISCHARGE: 0
COLOR CHANGE: 0
CHEST TIGHTNESS: 0
CONSTIPATION: 0
DIARRHEA: 0
RECTAL PAIN: 0
WHEEZING: 0
ANAL BLEEDING: 0
ABDOMINAL DISTENTION: 0
EYE REDNESS: 0
SORE THROAT: 0
EYE ITCHING: 0
CHOKING: 0
VOMITING: 0
SHORTNESS OF BREATH: 0
RHINORRHEA: 0
FACIAL SWELLING: 0
VOICE CHANGE: 0
PHOTOPHOBIA: 0
COUGH: 0

## 2025-07-24 NOTE — PROGRESS NOTES
concentration, dysphoric mood, hallucinations, self-injury, sleep disturbance and suicidal ideas. The patient is not nervous/anxious and is not hyperactive.        BP (!) 145/98 (Patient Position: Sitting, BP Cuff Size: Medium Adult)   Pulse 85   Ht 1.689 m (5' 6.5\")   Wt 83.8 kg (184 lb 12.8 oz)   SpO2 97%   BMI 29.38 kg/m²   Physical Exam  Vitals and nursing note reviewed.   Constitutional:       General: She is not in acute distress.     Appearance: Normal appearance. She is well-developed. She is not ill-appearing, toxic-appearing or diaphoretic.   HENT:      Head: Normocephalic and atraumatic.      Right Ear: Tympanic membrane, ear canal and external ear normal. There is no impacted cerumen.      Left Ear: Tympanic membrane, ear canal and external ear normal. There is no impacted cerumen.      Nose: Nose normal. No congestion or rhinorrhea.      Mouth/Throat:      Mouth: Mucous membranes are moist.      Pharynx: Oropharynx is clear. No oropharyngeal exudate or posterior oropharyngeal erythema.   Eyes:      General: No scleral icterus.        Right eye: No discharge.         Left eye: No discharge.      Extraocular Movements: Extraocular movements intact.      Conjunctiva/sclera: Conjunctivae normal.      Pupils: Pupils are equal, round, and reactive to light.   Neck:      Thyroid: No thyromegaly.      Vascular: No carotid bruit or JVD.      Trachea: No tracheal deviation.   Cardiovascular:      Rate and Rhythm: Normal rate and regular rhythm.      Pulses: Normal pulses.      Heart sounds: Normal heart sounds. No murmur heard.     No friction rub. No gallop.   Pulmonary:      Effort: Pulmonary effort is normal. No respiratory distress.      Breath sounds: Normal breath sounds. No stridor. No wheezing, rhonchi or rales.   Chest:      Chest wall: No tenderness.   Abdominal:      General: Bowel sounds are normal. There is no distension.      Palpations: Abdomen is soft. There is no mass.      Tenderness: There

## 2025-07-24 NOTE — TELEPHONE ENCOUNTER
Notified Madhav that Dr. Haddad wants her to get lab work done before he sees her on 8/1/25.  Madhav requested to come tomorrow at 8.

## 2025-07-24 NOTE — TELEPHONE ENCOUNTER
----- Message from Arya Haddad sent at 7/24/2025  1:28 PM CDT -----  Pls draw CBC w diff, CMP, CEA, [id number]  ----- Message -----  From: Interface, Scans Incoming  Sent: 7/24/2025   8:26 AM CDT  To: Arya Haddad MD

## 2025-07-25 ENCOUNTER — LAB (OUTPATIENT)
Dept: LAB | Facility: HOSPITAL | Age: 60
End: 2025-07-25
Payer: COMMERCIAL

## 2025-07-25 DIAGNOSIS — D05.12 DUCTAL CARCINOMA IN SITU OF LEFT BREAST: ICD-10-CM

## 2025-07-25 LAB
ALBUMIN SERPL-MCNC: 4 G/DL (ref 3.5–5.2)
ALBUMIN/GLOB SERPL: 1.3 G/DL
ALP SERPL-CCNC: 275 U/L (ref 39–117)
ALT SERPL W P-5'-P-CCNC: 15 U/L (ref 1–33)
ANION GAP SERPL CALCULATED.3IONS-SCNC: 12 MMOL/L (ref 5–15)
AST SERPL-CCNC: 23 U/L (ref 1–32)
BASOPHILS # BLD AUTO: 0.04 10*3/MM3 (ref 0–0.2)
BASOPHILS NFR BLD AUTO: 0.4 % (ref 0–1.5)
BILIRUB SERPL-MCNC: 0.4 MG/DL (ref 0–1.2)
BUN SERPL-MCNC: 5.7 MG/DL (ref 6–20)
BUN/CREAT SERPL: 8.1 (ref 7–25)
CALCIUM SPEC-SCNC: 9.6 MG/DL (ref 8.6–10.5)
CEA SERPL-MCNC: 0.69 NG/ML
CHLORIDE SERPL-SCNC: 99 MMOL/L (ref 98–107)
CO2 SERPL-SCNC: 29 MMOL/L (ref 22–29)
CREAT SERPL-MCNC: 0.7 MG/DL (ref 0.57–1)
DEPRECATED RDW RBC AUTO: 40.2 FL (ref 37–54)
EGFRCR SERPLBLD CKD-EPI 2021: 99.8 ML/MIN/1.73
EOSINOPHIL # BLD AUTO: 0.13 10*3/MM3 (ref 0–0.4)
EOSINOPHIL NFR BLD AUTO: 1.4 % (ref 0.3–6.2)
ERYTHROCYTE [DISTWIDTH] IN BLOOD BY AUTOMATED COUNT: 12.8 % (ref 12.3–15.4)
GLOBULIN UR ELPH-MCNC: 3.1 GM/DL
GLUCOSE SERPL-MCNC: 109 MG/DL (ref 65–99)
HCT VFR BLD AUTO: 46.3 % (ref 34–46.6)
HGB BLD-MCNC: 14.7 G/DL (ref 12–15.9)
IMM GRANULOCYTES # BLD AUTO: 0.02 10*3/MM3 (ref 0–0.05)
IMM GRANULOCYTES NFR BLD AUTO: 0.2 % (ref 0–0.5)
LYMPHOCYTES # BLD AUTO: 0.95 10*3/MM3 (ref 0.7–3.1)
LYMPHOCYTES NFR BLD AUTO: 10.5 % (ref 19.6–45.3)
MCH RBC QN AUTO: 27.5 PG (ref 26.6–33)
MCHC RBC AUTO-ENTMCNC: 31.7 G/DL (ref 31.5–35.7)
MCV RBC AUTO: 86.5 FL (ref 79–97)
MONOCYTES # BLD AUTO: 0.58 10*3/MM3 (ref 0.1–0.9)
MONOCYTES NFR BLD AUTO: 6.4 % (ref 5–12)
NEUTROPHILS NFR BLD AUTO: 7.32 10*3/MM3 (ref 1.7–7)
NEUTROPHILS NFR BLD AUTO: 81.1 % (ref 42.7–76)
NRBC BLD AUTO-RTO: 0 /100 WBC (ref 0–0.2)
PLATELET # BLD AUTO: 370 10*3/MM3 (ref 140–450)
PMV BLD AUTO: 8.6 FL (ref 6–12)
POTASSIUM SERPL-SCNC: 3.5 MMOL/L (ref 3.5–5.2)
PROT SERPL-MCNC: 7.1 G/DL (ref 6–8.5)
RBC # BLD AUTO: 5.35 10*6/MM3 (ref 3.77–5.28)
SODIUM SERPL-SCNC: 140 MMOL/L (ref 136–145)
WBC NRBC COR # BLD AUTO: 9.04 10*3/MM3 (ref 3.4–10.8)

## 2025-07-25 PROCEDURE — 86300 IMMUNOASSAY TUMOR CA 15-3: CPT

## 2025-07-25 PROCEDURE — 36415 COLL VENOUS BLD VENIPUNCTURE: CPT

## 2025-07-25 PROCEDURE — 82378 CARCINOEMBRYONIC ANTIGEN: CPT

## 2025-07-25 PROCEDURE — 80053 COMPREHEN METABOLIC PANEL: CPT

## 2025-07-25 PROCEDURE — 85025 COMPLETE CBC W/AUTO DIFF WBC: CPT

## 2025-07-26 LAB — CANCER AG27-29 SERPL-ACNC: 181.3 U/ML (ref 0–38.6)

## 2025-07-29 ENCOUNTER — PATIENT MESSAGE (OUTPATIENT)
Dept: INTERNAL MEDICINE | Age: 60
End: 2025-07-29

## 2025-07-29 DIAGNOSIS — R16.0 LIVER MASS: ICD-10-CM

## 2025-07-29 DIAGNOSIS — C50.919 MALIGNANT NEOPLASM OF FEMALE BREAST, UNSPECIFIED ESTROGEN RECEPTOR STATUS, UNSPECIFIED LATERALITY, UNSPECIFIED SITE OF BREAST (HCC): Primary | ICD-10-CM

## 2025-07-29 DIAGNOSIS — D05.12 DUCTAL CARCINOMA IN SITU OF LEFT BREAST: Primary | ICD-10-CM

## 2025-07-29 RX ORDER — HYDROCODONE BITARTRATE AND ACETAMINOPHEN 10; 325 MG/1; MG/1
1 TABLET ORAL EVERY 8 HOURS PRN
Qty: 15 TABLET | Refills: 0 | Status: SHIPPED | OUTPATIENT
Start: 2025-07-29 | End: 2025-08-03

## 2025-07-31 DIAGNOSIS — D05.12 DUCTAL CARCINOMA IN SITU OF LEFT BREAST: Primary | ICD-10-CM

## 2025-07-31 RX ORDER — HYDROCODONE BITARTRATE AND ACETAMINOPHEN 10; 325 MG/1; MG/1
1 TABLET ORAL EVERY 6 HOURS PRN
Qty: 40 TABLET | Refills: 0 | Status: SHIPPED | OUTPATIENT
Start: 2025-07-31

## 2025-08-04 ENCOUNTER — OFFICE VISIT (OUTPATIENT)
Dept: PULMONOLOGY | Facility: CLINIC | Age: 60
End: 2025-08-04
Payer: COMMERCIAL

## 2025-08-04 ENCOUNTER — TELEPHONE (OUTPATIENT)
Dept: PULMONOLOGY | Facility: CLINIC | Age: 60
End: 2025-08-04

## 2025-08-04 VITALS
SYSTOLIC BLOOD PRESSURE: 142 MMHG | BODY MASS INDEX: 29.03 KG/M2 | HEIGHT: 67 IN | HEART RATE: 91 BPM | RESPIRATION RATE: 18 BRPM | OXYGEN SATURATION: 97 % | DIASTOLIC BLOOD PRESSURE: 92 MMHG | WEIGHT: 185 LBS

## 2025-08-04 DIAGNOSIS — R91.1 PULMONARY NODULE: ICD-10-CM

## 2025-08-04 DIAGNOSIS — D05.12 DUCTAL CARCINOMA IN SITU OF LEFT BREAST: Primary | ICD-10-CM

## 2025-08-04 PROCEDURE — 99204 OFFICE O/P NEW MOD 45 MIN: CPT | Performed by: INTERNAL MEDICINE

## 2025-08-06 ENCOUNTER — TELEPHONE (OUTPATIENT)
Dept: INTERVENTIONAL RADIOLOGY/VASCULAR | Facility: HOSPITAL | Age: 60
End: 2025-08-06
Payer: COMMERCIAL

## 2025-08-06 DIAGNOSIS — D05.12 DUCTAL CARCINOMA IN SITU OF LEFT BREAST: Primary | ICD-10-CM

## 2025-08-07 ENCOUNTER — HOSPITAL ENCOUNTER (OUTPATIENT)
Dept: ULTRASOUND IMAGING | Facility: HOSPITAL | Age: 60
Discharge: HOME | End: 2025-08-07
Payer: COMMERCIAL

## 2025-08-07 VITALS
SYSTOLIC BLOOD PRESSURE: 155 MMHG | HEART RATE: 83 BPM | RESPIRATION RATE: 16 BRPM | HEIGHT: 66 IN | BODY MASS INDEX: 29.3 KG/M2 | TEMPERATURE: 97.1 F | WEIGHT: 182.32 LBS | DIASTOLIC BLOOD PRESSURE: 101 MMHG | OXYGEN SATURATION: 93 %

## 2025-08-07 DIAGNOSIS — D05.12 DUCTAL CARCINOMA IN SITU OF LEFT BREAST: ICD-10-CM

## 2025-08-07 LAB
HGB BLD-MCNC: 14.4 G/DL (ref 12–15.9)
INR PPP: 1.03 (ref 0.91–1.09)
PLATELET # BLD AUTO: 323 10*3/MM3 (ref 140–450)
PROTHROMBIN TIME: 14 SECONDS (ref 11.8–14.8)

## 2025-08-07 PROCEDURE — 85018 HEMOGLOBIN: CPT | Performed by: RADIOLOGY

## 2025-08-07 PROCEDURE — 76942 ECHO GUIDE FOR BIOPSY: CPT

## 2025-08-07 PROCEDURE — 85610 PROTHROMBIN TIME: CPT | Performed by: RADIOLOGY

## 2025-08-07 PROCEDURE — 76705 ECHO EXAM OF ABDOMEN: CPT

## 2025-08-07 PROCEDURE — 85049 AUTOMATED PLATELET COUNT: CPT | Performed by: RADIOLOGY

## 2025-08-07 RX ORDER — LIDOCAINE HYDROCHLORIDE 10 MG/ML
15 INJECTION, SOLUTION INFILTRATION; PERINEURAL ONCE
Status: DISPENSED | OUTPATIENT
Start: 2025-08-07

## 2025-08-07 RX ORDER — SODIUM CHLORIDE 0.9 % (FLUSH) 0.9 %
10 SYRINGE (ML) INJECTION AS NEEDED
Status: DISCONTINUED | OUTPATIENT
Start: 2025-08-07 | End: 2025-08-08 | Stop reason: HOSPADM

## 2025-08-07 RX ORDER — ACETAMINOPHEN 325 MG/1
650 TABLET ORAL EVERY 6 HOURS PRN
COMMUNITY

## 2025-08-07 RX ORDER — SODIUM CHLORIDE 0.9 % (FLUSH) 0.9 %
3 SYRINGE (ML) INJECTION EVERY 12 HOURS SCHEDULED
Status: DISCONTINUED | OUTPATIENT
Start: 2025-08-07 | End: 2025-08-08 | Stop reason: HOSPADM

## 2025-08-07 RX ORDER — SODIUM CHLORIDE 9 MG/ML
40 INJECTION, SOLUTION INTRAVENOUS AS NEEDED
Status: DISCONTINUED | OUTPATIENT
Start: 2025-08-07 | End: 2025-08-08 | Stop reason: HOSPADM

## 2025-08-11 ENCOUNTER — HOSPITAL ENCOUNTER (OUTPATIENT)
Dept: CT IMAGING | Facility: HOSPITAL | Age: 60
Discharge: HOME | End: 2025-08-11
Payer: COMMERCIAL

## 2025-08-11 ENCOUNTER — PRE-ADMISSION TESTING (OUTPATIENT)
Dept: PREADMISSION TESTING | Facility: HOSPITAL | Age: 60
End: 2025-08-11
Payer: COMMERCIAL

## 2025-08-11 VITALS
HEIGHT: 65 IN | RESPIRATION RATE: 16 BRPM | OXYGEN SATURATION: 100 % | SYSTOLIC BLOOD PRESSURE: 163 MMHG | BODY MASS INDEX: 30.12 KG/M2 | WEIGHT: 180.78 LBS | DIASTOLIC BLOOD PRESSURE: 96 MMHG | HEART RATE: 97 BPM

## 2025-08-11 DIAGNOSIS — R91.1 PULMONARY NODULE: ICD-10-CM

## 2025-08-11 PROCEDURE — 71250 CT THORAX DX C-: CPT

## 2025-08-12 ENCOUNTER — RESULTS FOLLOW-UP (OUTPATIENT)
Dept: PULMONOLOGY | Facility: CLINIC | Age: 60
End: 2025-08-12
Payer: COMMERCIAL

## 2025-08-12 ENCOUNTER — RESULTS FOLLOW-UP (OUTPATIENT)
Dept: ULTRASOUND IMAGING | Facility: HOSPITAL | Age: 60
End: 2025-08-12
Payer: COMMERCIAL

## 2025-08-12 ENCOUNTER — TELEPHONE (OUTPATIENT)
Dept: UROLOGY | Age: 60
End: 2025-08-12

## 2025-08-12 DIAGNOSIS — C22.1 CHOLANGIOCARCINOMA: Primary | ICD-10-CM

## 2025-08-12 LAB
BEAKER LAB AP INTRAOPERATIVE CONSULTATION: NORMAL
CYTO UR: NORMAL
LAB AP CASE REPORT: NORMAL
LAB AP CLINICAL INFORMATION: NORMAL
LAB AP DIAGNOSIS COMMENT: NORMAL
LAB AP INTRADEPARTMENTAL CONSULT: NORMAL
Lab: NORMAL
PATH REPORT.FINAL DX SPEC: NORMAL
PATH REPORT.GROSS SPEC: NORMAL

## 2025-08-13 DIAGNOSIS — D05.12 DUCTAL CARCINOMA IN SITU OF LEFT BREAST: ICD-10-CM

## 2025-08-13 RX ORDER — FENTANYL 25 UG/1
1 PATCH TRANSDERMAL
Qty: 10 PATCH | Refills: 0 | Status: SHIPPED | OUTPATIENT
Start: 2025-08-13

## 2025-08-13 RX ORDER — HYDROCODONE BITARTRATE AND ACETAMINOPHEN 10; 325 MG/1; MG/1
1 TABLET ORAL EVERY 6 HOURS PRN
Qty: 40 TABLET | Refills: 0 | Status: SHIPPED | OUTPATIENT
Start: 2025-08-13

## 2025-08-13 RX ORDER — OMEPRAZOLE 20 MG/1
20 CAPSULE, DELAYED RELEASE ORAL
Qty: 30 CAPSULE | Refills: 0 | Status: SHIPPED | OUTPATIENT
Start: 2025-08-13

## 2025-08-14 ENCOUNTER — HOSPITAL ENCOUNTER (OUTPATIENT)
Facility: HOSPITAL | Age: 60
Setting detail: HOSPITAL OUTPATIENT SURGERY
Discharge: HOME | End: 2025-08-14
Attending: INTERNAL MEDICINE | Admitting: INTERNAL MEDICINE
Payer: COMMERCIAL

## 2025-08-14 ENCOUNTER — APPOINTMENT (OUTPATIENT)
Dept: GENERAL RADIOLOGY | Facility: HOSPITAL | Age: 60
End: 2025-08-14
Payer: COMMERCIAL

## 2025-08-14 ENCOUNTER — ANESTHESIA EVENT (OUTPATIENT)
Dept: PERIOP | Facility: HOSPITAL | Age: 60
End: 2025-08-14
Payer: COMMERCIAL

## 2025-08-14 ENCOUNTER — ANESTHESIA (OUTPATIENT)
Dept: PERIOP | Facility: HOSPITAL | Age: 60
End: 2025-08-14
Payer: COMMERCIAL

## 2025-08-14 PROCEDURE — 25010000002 SUGAMMADEX 200 MG/2ML SOLUTION: Performed by: NURSE ANESTHETIST, CERTIFIED REGISTERED

## 2025-08-14 PROCEDURE — 25010000002 PROPOFOL 10 MG/ML EMULSION: Performed by: NURSE ANESTHETIST, CERTIFIED REGISTERED

## 2025-08-14 PROCEDURE — 25010000002 LIDOCAINE PF 2% 2 % SOLUTION: Performed by: NURSE ANESTHETIST, CERTIFIED REGISTERED

## 2025-08-14 RX ORDER — LIDOCAINE HYDROCHLORIDE 20 MG/ML
INJECTION, SOLUTION EPIDURAL; INFILTRATION; INTRACAUDAL; PERINEURAL AS NEEDED
Status: DISCONTINUED | OUTPATIENT
Start: 2025-08-14 | End: 2025-08-14 | Stop reason: SURG

## 2025-08-14 RX ORDER — PROPOFOL 10 MG/ML
VIAL (ML) INTRAVENOUS AS NEEDED
Status: DISCONTINUED | OUTPATIENT
Start: 2025-08-14 | End: 2025-08-14 | Stop reason: SURG

## 2025-08-14 RX ORDER — ROCURONIUM BROMIDE 10 MG/ML
INJECTION, SOLUTION INTRAVENOUS AS NEEDED
Status: DISCONTINUED | OUTPATIENT
Start: 2025-08-14 | End: 2025-08-14 | Stop reason: SURG

## 2025-08-14 RX ADMIN — ROCURONIUM BROMIDE 50 MG: 10 INJECTION, SOLUTION INTRAVENOUS at 14:18

## 2025-08-14 RX ADMIN — SUGAMMADEX 200 MG: 100 INJECTION, SOLUTION INTRAVENOUS at 14:56

## 2025-08-14 RX ADMIN — LIDOCAINE HYDROCHLORIDE 40 MG: 20 INJECTION, SOLUTION EPIDURAL; INFILTRATION; INTRACAUDAL; PERINEURAL at 14:18

## 2025-08-14 RX ADMIN — PROPOFOL 150 MG: 10 INJECTION, EMULSION INTRAVENOUS at 14:18

## 2025-08-15 ENCOUNTER — CONSULT (OUTPATIENT)
Age: 60
End: 2025-08-15
Payer: COMMERCIAL

## 2025-08-15 ENCOUNTER — TELEPHONE (OUTPATIENT)
Dept: SURGERY | Facility: CLINIC | Age: 60
End: 2025-08-15
Payer: COMMERCIAL

## 2025-08-15 VITALS
BODY MASS INDEX: 29.82 KG/M2 | HEART RATE: 97 BPM | WEIGHT: 179 LBS | DIASTOLIC BLOOD PRESSURE: 99 MMHG | SYSTOLIC BLOOD PRESSURE: 149 MMHG | HEIGHT: 65 IN | OXYGEN SATURATION: 95 %

## 2025-08-15 DIAGNOSIS — D05.12 DUCTAL CARCINOMA IN SITU OF LEFT BREAST: Primary | ICD-10-CM

## 2025-08-18 ENCOUNTER — OFFICE VISIT (OUTPATIENT)
Dept: SURGERY | Facility: CLINIC | Age: 60
End: 2025-08-18
Payer: COMMERCIAL

## 2025-08-18 VITALS
SYSTOLIC BLOOD PRESSURE: 142 MMHG | HEIGHT: 65 IN | BODY MASS INDEX: 30.19 KG/M2 | DIASTOLIC BLOOD PRESSURE: 90 MMHG | WEIGHT: 181.2 LBS

## 2025-08-18 DIAGNOSIS — Z45.2 ENCOUNTER FOR CARE RELATED TO PORT-A-CATH: Primary | ICD-10-CM

## 2025-08-18 DIAGNOSIS — C78.7 CHOLANGIOCARCINOMA METASTATIC TO LIVER: ICD-10-CM

## 2025-08-18 DIAGNOSIS — C22.1 CHOLANGIOCARCINOMA METASTATIC TO LIVER: ICD-10-CM

## 2025-08-18 PROCEDURE — 1159F MED LIST DOCD IN RCRD: CPT

## 2025-08-18 PROCEDURE — 1160F RVW MEDS BY RX/DR IN RCRD: CPT

## 2025-08-18 PROCEDURE — 99204 OFFICE O/P NEW MOD 45 MIN: CPT

## 2025-08-19 ENCOUNTER — PATIENT ROUNDING (BHMG ONLY) (OUTPATIENT)
Dept: SURGERY | Facility: CLINIC | Age: 60
End: 2025-08-19
Payer: COMMERCIAL

## 2025-08-20 ENCOUNTER — HOSPITAL ENCOUNTER (OUTPATIENT)
Dept: CT IMAGING | Facility: HOSPITAL | Age: 60
Discharge: HOME | End: 2025-08-20
Payer: COMMERCIAL

## 2025-08-20 ENCOUNTER — HOSPITAL ENCOUNTER (OUTPATIENT)
Dept: MRI IMAGING | Facility: HOSPITAL | Age: 60
Discharge: HOME | End: 2025-08-20
Payer: COMMERCIAL

## 2025-08-20 DIAGNOSIS — D05.12 DUCTAL CARCINOMA IN SITU OF LEFT BREAST: ICD-10-CM

## 2025-08-20 PROBLEM — Z78.9 NON-SMOKER: Status: ACTIVE | Noted: 2025-08-20

## 2025-08-20 LAB
BEAKER LAB AP INTRAOPERATIVE CONSULTATION: NORMAL
CREAT BLDA-MCNC: 0.7 MG/DL (ref 0.6–1.3)
CYTO UR: NORMAL
DNA RANGE(S) EXAMINED NAR: NORMAL
GENE DIS ANL INTERP-IMP: POSITIVE
GENE DIS ASSESSED: NORMAL
GENE MUT TESTED BLD/T: 1.1 M/MB
GLUCOSE BLDC GLUCOMTR-MCNC: 95 MG/DL (ref 70–130)
LAB AP CASE REPORT: NORMAL
LAB AP CLINICAL INFORMATION: NORMAL
LAB AP DIAGNOSIS COMMENT: NORMAL
LAB AP INTRADEPARTMENTAL CONSULT: NORMAL
Lab: NORMAL
MSI CA SPEC-IMP: NORMAL
PATH REPORT.ADDENDUM SPEC: NORMAL
PATH REPORT.FINAL DX SPEC: NORMAL
PATH REPORT.GROSS SPEC: NORMAL
REASON FOR STUDY: NORMAL
TEMPUS GERMLINE NOTE: NORMAL
TEMPUS LCA: NORMAL
TEMPUS PORTAL: NORMAL
TEMPUS THERAPY1: NORMAL
TEMPUS THERAPY2: NORMAL
TEMPUS THERAPY3: NORMAL
TEMPUS THERAPY4: NORMAL
TEMPUS THERAPY5: NORMAL
TEMPUS THERAPY6: NORMAL
TEMPUS THERAPYCOUNT: 6
TEMPUS TRIALCOUNT: 3
TEMPUS TRIALMATCHES1: NORMAL
TEMPUS TRIALMATCHES2: NORMAL
TEMPUS TRIALMATCHES3: NORMAL

## 2025-08-20 PROCEDURE — 34310000005 FLUDEOXYGLUCOSE F18 SOLUTION: Performed by: RADIOLOGY

## 2025-08-20 PROCEDURE — 78815 PET IMAGE W/CT SKULL-THIGH: CPT

## 2025-08-20 PROCEDURE — A9573 GADOPICLENOL 0.5 MMOL/ML SOLUTION: HCPCS | Performed by: RADIOLOGY

## 2025-08-20 PROCEDURE — 82565 ASSAY OF CREATININE: CPT

## 2025-08-20 PROCEDURE — 82948 REAGENT STRIP/BLOOD GLUCOSE: CPT

## 2025-08-20 PROCEDURE — A9552 F18 FDG: HCPCS | Performed by: RADIOLOGY

## 2025-08-20 PROCEDURE — 70553 MRI BRAIN STEM W/O & W/DYE: CPT

## 2025-08-20 PROCEDURE — 25510000001 GADOPICLENOL 0.5 MMOL/ML SOLUTION: Performed by: RADIOLOGY

## 2025-08-20 RX ADMIN — GADOPICLENOL 8.5 ML: 485.1 INJECTION INTRAVENOUS at 10:00

## 2025-08-20 RX ADMIN — FLUDEOXYGLUCOSE F18 1 DOSE: 300 INJECTION INTRAVENOUS at 10:18

## 2025-08-21 RX ORDER — ONDANSETRON 8 MG/1
8 TABLET, FILM COATED ORAL EVERY 8 HOURS PRN
Qty: 60 TABLET | Refills: 3 | Status: ON HOLD | OUTPATIENT
Start: 2025-08-21

## 2025-08-22 ENCOUNTER — ANESTHESIA EVENT (OUTPATIENT)
Dept: PERIOP | Facility: HOSPITAL | Age: 60
End: 2025-08-22
Payer: COMMERCIAL

## 2025-08-22 ENCOUNTER — HOSPITAL ENCOUNTER (OUTPATIENT)
Dept: GENERAL RADIOLOGY | Facility: HOSPITAL | Age: 60
Discharge: HOME | End: 2025-08-22
Payer: COMMERCIAL

## 2025-08-22 ENCOUNTER — OFFICE VISIT (OUTPATIENT)
Age: 60
End: 2025-08-22
Payer: COMMERCIAL

## 2025-08-22 ENCOUNTER — OFFICE VISIT (OUTPATIENT)
Dept: ONCOLOGY | Facility: CLINIC | Age: 60
End: 2025-08-22
Payer: COMMERCIAL

## 2025-08-22 VITALS
HEART RATE: 77 BPM | OXYGEN SATURATION: 96 % | BODY MASS INDEX: 31.19 KG/M2 | WEIGHT: 182.7 LBS | DIASTOLIC BLOOD PRESSURE: 80 MMHG | HEIGHT: 64 IN | SYSTOLIC BLOOD PRESSURE: 139 MMHG

## 2025-08-22 VITALS
RESPIRATION RATE: 18 BRPM | TEMPERATURE: 97.8 F | SYSTOLIC BLOOD PRESSURE: 138 MMHG | WEIGHT: 182 LBS | OXYGEN SATURATION: 100 % | DIASTOLIC BLOOD PRESSURE: 92 MMHG | BODY MASS INDEX: 31.07 KG/M2 | HEART RATE: 86 BPM | HEIGHT: 64 IN

## 2025-08-22 DIAGNOSIS — D05.12 DUCTAL CARCINOMA IN SITU OF LEFT BREAST: ICD-10-CM

## 2025-08-22 DIAGNOSIS — C78.7 CHOLANGIOCARCINOMA METASTATIC TO LIVER: ICD-10-CM

## 2025-08-22 DIAGNOSIS — Z78.9 NON-SMOKER: ICD-10-CM

## 2025-08-22 DIAGNOSIS — C78.7 CHOLANGIOCARCINOMA METASTATIC TO LIVER: Primary | ICD-10-CM

## 2025-08-22 DIAGNOSIS — C22.1 CHOLANGIOCARCINOMA METASTATIC TO LIVER: Primary | ICD-10-CM

## 2025-08-22 DIAGNOSIS — C22.1 CHOLANGIOCARCINOMA METASTATIC TO LIVER: ICD-10-CM

## 2025-08-22 DIAGNOSIS — D05.12 DUCTAL CARCINOMA IN SITU OF LEFT BREAST: Primary | ICD-10-CM

## 2025-08-22 PROBLEM — M84.40XA PATHOLOGIC FRACTURE: Status: ACTIVE | Noted: 2025-08-22

## 2025-08-22 PROBLEM — G89.3 CANCER ASSOCIATED PAIN: Status: ACTIVE | Noted: 2025-08-22

## 2025-08-22 PROBLEM — M84.40XA PATHOLOGIC FRACTURE: Status: RESOLVED | Noted: 2025-08-22 | Resolved: 2025-08-22

## 2025-08-22 PROBLEM — M89.8X9 LYTIC LESION OF BONE ON X-RAY: Status: ACTIVE | Noted: 2025-08-22

## 2025-08-22 PROBLEM — Z85.3 HISTORY OF BREAST CANCER: Status: ACTIVE | Noted: 2025-08-22

## 2025-08-22 PROBLEM — R52 ACUTE PAIN: Status: ACTIVE | Noted: 2025-08-22

## 2025-08-22 PROCEDURE — 73560 X-RAY EXAM OF KNEE 1 OR 2: CPT

## 2025-08-22 PROCEDURE — 73552 X-RAY EXAM OF FEMUR 2/>: CPT

## 2025-08-22 PROCEDURE — 73590 X-RAY EXAM OF LOWER LEG: CPT

## 2025-08-22 RX ORDER — HYDROCODONE BITARTRATE AND ACETAMINOPHEN 10; 325 MG/1; MG/1
1 TABLET ORAL EVERY 6 HOURS PRN
Qty: 120 TABLET | Refills: 0 | Status: ON HOLD | OUTPATIENT
Start: 2025-08-22

## 2025-08-22 RX ORDER — FENTANYL 75 UG/1
1 PATCH TRANSDERMAL
Qty: 10 PATCH | Refills: 0 | Status: ON HOLD | OUTPATIENT
Start: 2025-08-22

## 2025-08-23 ENCOUNTER — ANESTHESIA (OUTPATIENT)
Dept: PERIOP | Facility: HOSPITAL | Age: 60
End: 2025-08-23
Payer: COMMERCIAL

## 2025-08-23 PROBLEM — M89.8X5 LYTIC BONE LESION OF LEFT FEMUR: Status: ACTIVE | Noted: 2025-08-22

## 2025-08-23 PROCEDURE — 25010000002 CEFAZOLIN PER 500 MG: Performed by: NURSE ANESTHETIST, CERTIFIED REGISTERED

## 2025-08-23 PROCEDURE — 25810000003 LACTATED RINGERS PER 1000 ML: Performed by: NURSE ANESTHETIST, CERTIFIED REGISTERED

## 2025-08-23 PROCEDURE — 25010000002 FENTANYL CITRATE (PF) 100 MCG/2ML SOLUTION: Performed by: NURSE ANESTHETIST, CERTIFIED REGISTERED

## 2025-08-23 PROCEDURE — 25010000002 ONDANSETRON PER 1 MG: Performed by: NURSE ANESTHETIST, CERTIFIED REGISTERED

## 2025-08-23 PROCEDURE — 25010000002 SUGAMMADEX 200 MG/2ML SOLUTION: Performed by: NURSE ANESTHETIST, CERTIFIED REGISTERED

## 2025-08-23 PROCEDURE — 25010000002 PROPOFOL 10 MG/ML EMULSION: Performed by: NURSE ANESTHETIST, CERTIFIED REGISTERED

## 2025-08-23 PROCEDURE — 25010000002 DEXAMETHASONE PER 1 MG: Performed by: NURSE ANESTHETIST, CERTIFIED REGISTERED

## 2025-08-23 RX ORDER — ONDANSETRON 2 MG/ML
INJECTION INTRAMUSCULAR; INTRAVENOUS AS NEEDED
Status: DISCONTINUED | OUTPATIENT
Start: 2025-08-23 | End: 2025-08-23 | Stop reason: SURG

## 2025-08-23 RX ORDER — PROPOFOL 10 MG/ML
VIAL (ML) INTRAVENOUS AS NEEDED
Status: DISCONTINUED | OUTPATIENT
Start: 2025-08-23 | End: 2025-08-23 | Stop reason: SURG

## 2025-08-23 RX ORDER — FENTANYL CITRATE 50 UG/ML
INJECTION, SOLUTION INTRAMUSCULAR; INTRAVENOUS AS NEEDED
Status: DISCONTINUED | OUTPATIENT
Start: 2025-08-23 | End: 2025-08-23 | Stop reason: SURG

## 2025-08-23 RX ORDER — DEXAMETHASONE SODIUM PHOSPHATE 4 MG/ML
INJECTION, SOLUTION INTRA-ARTICULAR; INTRALESIONAL; INTRAMUSCULAR; INTRAVENOUS; SOFT TISSUE AS NEEDED
Status: DISCONTINUED | OUTPATIENT
Start: 2025-08-23 | End: 2025-08-23 | Stop reason: SURG

## 2025-08-23 RX ORDER — CEFAZOLIN SODIUM 1 G/3ML
INJECTION, POWDER, FOR SOLUTION INTRAMUSCULAR; INTRAVENOUS AS NEEDED
Status: DISCONTINUED | OUTPATIENT
Start: 2025-08-23 | End: 2025-08-23 | Stop reason: SURG

## 2025-08-23 RX ORDER — SODIUM CHLORIDE, SODIUM LACTATE, POTASSIUM CHLORIDE, CALCIUM CHLORIDE 600; 310; 30; 20 MG/100ML; MG/100ML; MG/100ML; MG/100ML
INJECTION, SOLUTION INTRAVENOUS CONTINUOUS PRN
Status: DISCONTINUED | OUTPATIENT
Start: 2025-08-23 | End: 2025-08-23 | Stop reason: SURG

## 2025-08-23 RX ORDER — ROCURONIUM BROMIDE 10 MG/ML
INJECTION, SOLUTION INTRAVENOUS AS NEEDED
Status: DISCONTINUED | OUTPATIENT
Start: 2025-08-23 | End: 2025-08-23 | Stop reason: SURG

## 2025-08-23 RX ADMIN — PROPOFOL 150 MG: 10 INJECTION, EMULSION INTRAVENOUS at 12:18

## 2025-08-23 RX ADMIN — SODIUM CHLORIDE, POTASSIUM CHLORIDE, SODIUM LACTATE AND CALCIUM CHLORIDE: 600; 310; 30; 20 INJECTION, SOLUTION INTRAVENOUS at 12:14

## 2025-08-23 RX ADMIN — SUGAMMADEX 100 MG: 100 INJECTION, SOLUTION INTRAVENOUS at 13:25

## 2025-08-23 RX ADMIN — ONDANSETRON 4 MG: 2 INJECTION INTRAMUSCULAR; INTRAVENOUS at 12:41

## 2025-08-23 RX ADMIN — FENTANYL CITRATE 100 MCG: 50 INJECTION, SOLUTION INTRAMUSCULAR; INTRAVENOUS at 12:15

## 2025-08-23 RX ADMIN — DEXAMETHASONE SODIUM PHOSPHATE 4 MG: 4 INJECTION, SOLUTION INTRA-ARTICULAR; INTRALESIONAL; INTRAMUSCULAR; INTRAVENOUS; SOFT TISSUE at 12:17

## 2025-08-23 RX ADMIN — FENTANYL CITRATE 100 MCG: 50 INJECTION, SOLUTION INTRAMUSCULAR; INTRAVENOUS at 12:49

## 2025-08-23 RX ADMIN — ROCURONIUM BROMIDE 40 MG: 10 INJECTION INTRAVENOUS at 12:18

## 2025-08-23 RX ADMIN — CEFAZOLIN 2 G: 1 INJECTION, POWDER, FOR SOLUTION INTRAMUSCULAR; INTRAVENOUS at 12:25

## 2025-08-25 ENCOUNTER — ANESTHESIA EVENT (OUTPATIENT)
Dept: PERIOP | Facility: HOSPITAL | Age: 60
End: 2025-08-25
Payer: COMMERCIAL

## 2025-08-25 ENCOUNTER — ANESTHESIA (OUTPATIENT)
Dept: PERIOP | Facility: HOSPITAL | Age: 60
End: 2025-08-25
Payer: COMMERCIAL

## 2025-08-25 DIAGNOSIS — N20.0 KIDNEY STONE: Primary | ICD-10-CM

## 2025-08-25 PROCEDURE — 25010000002 PROPOFOL 10 MG/ML EMULSION: Performed by: NURSE ANESTHETIST, CERTIFIED REGISTERED

## 2025-08-25 PROCEDURE — 25010000002 FENTANYL CITRATE (PF) 100 MCG/2ML SOLUTION: Performed by: NURSE ANESTHETIST, CERTIFIED REGISTERED

## 2025-08-25 PROCEDURE — 25010000002 CEFAZOLIN PER 500 MG: Performed by: STUDENT IN AN ORGANIZED HEALTH CARE EDUCATION/TRAINING PROGRAM

## 2025-08-25 RX ORDER — FENTANYL CITRATE 50 UG/ML
INJECTION, SOLUTION INTRAMUSCULAR; INTRAVENOUS AS NEEDED
Status: DISCONTINUED | OUTPATIENT
Start: 2025-08-25 | End: 2025-08-25 | Stop reason: SURG

## 2025-08-25 RX ORDER — BUPIVACAINE HCL/0.9 % NACL/PF 0.125 %
PLASTIC BAG, INJECTION (ML) EPIDURAL AS NEEDED
Status: DISCONTINUED | OUTPATIENT
Start: 2025-08-25 | End: 2025-08-25 | Stop reason: SURG

## 2025-08-25 RX ORDER — LIDOCAINE AND PRILOCAINE 25; 25 MG/G; MG/G
CREAM TOPICAL
Qty: 30 G | Refills: 1 | Status: SHIPPED | OUTPATIENT
Start: 2025-08-25

## 2025-08-25 RX ORDER — PROPOFOL 10 MG/ML
VIAL (ML) INTRAVENOUS AS NEEDED
Status: DISCONTINUED | OUTPATIENT
Start: 2025-08-25 | End: 2025-08-25 | Stop reason: SURG

## 2025-08-25 RX ADMIN — FENTANYL CITRATE 50 MCG: 50 INJECTION, SOLUTION INTRAMUSCULAR; INTRAVENOUS at 14:48

## 2025-08-25 RX ADMIN — Medication 100 MCG: at 14:51

## 2025-08-25 RX ADMIN — Medication 100 MCG: at 15:09

## 2025-08-25 RX ADMIN — Medication 100 MCG: at 15:01

## 2025-08-25 RX ADMIN — Medication 50 MCG: at 14:57

## 2025-08-25 RX ADMIN — PROPOFOL 130 MCG/KG/MIN: 10 INJECTION, EMULSION INTRAVENOUS at 14:46

## 2025-08-25 RX ADMIN — FENTANYL CITRATE 50 MCG: 50 INJECTION, SOLUTION INTRAMUSCULAR; INTRAVENOUS at 14:49

## 2025-08-25 RX ADMIN — CEFAZOLIN 2000 MG: 2 INJECTION, POWDER, FOR SOLUTION INTRAMUSCULAR; INTRAVENOUS at 14:45

## 2025-08-25 RX ADMIN — PROPOFOL 70 MG: 10 INJECTION, EMULSION INTRAVENOUS at 14:45

## 2025-08-26 ENCOUNTER — READMISSION MANAGEMENT (OUTPATIENT)
Dept: CALL CENTER | Facility: HOSPITAL | Age: 60
End: 2025-08-26
Payer: COMMERCIAL

## 2025-08-26 LAB
DNA RANGE(S) EXAMINED NAR: NORMAL
GENE DIS ANL INTERP-IMP: POSITIVE
GENE DIS ASSESSED: NORMAL
GENE MUT TESTED BLD/T: 1.1 M/MB
MSI CA SPEC-IMP: NORMAL
REASON FOR STUDY: NORMAL
TEMPUS GERMLINE NOTE: NORMAL
TEMPUS LCA: NORMAL
TEMPUS PORTAL: NORMAL
TEMPUS THERAPY1: NORMAL
TEMPUS THERAPY2: NORMAL
TEMPUS THERAPY3: NORMAL
TEMPUS THERAPY4: NORMAL
TEMPUS THERAPY5: NORMAL
TEMPUS THERAPY6: NORMAL
TEMPUS THERAPYCOUNT: 6
TEMPUS TRIALCOUNT: 3
TEMPUS TRIALMATCHES1: NORMAL
TEMPUS TRIALMATCHES2: NORMAL
TEMPUS TRIALMATCHES3: NORMAL
TEMPUS XR RESULT 1: NORMAL

## 2025-08-27 ENCOUNTER — TELEPHONE (OUTPATIENT)
Dept: SURGERY | Facility: CLINIC | Age: 60
End: 2025-08-27
Payer: COMMERCIAL

## 2025-08-27 ENCOUNTER — CLINICAL SUPPORT (OUTPATIENT)
Dept: ONCOLOGY | Facility: CLINIC | Age: 60
End: 2025-08-27
Payer: COMMERCIAL

## 2025-08-27 ENCOUNTER — LAB (OUTPATIENT)
Dept: LAB | Facility: HOSPITAL | Age: 60
End: 2025-08-27
Payer: COMMERCIAL

## 2025-08-27 ENCOUNTER — DOCUMENTATION (OUTPATIENT)
Dept: RADIATION ONCOLOGY | Facility: HOSPITAL | Age: 60
End: 2025-08-27
Payer: COMMERCIAL

## 2025-08-27 ENCOUNTER — INFUSION (OUTPATIENT)
Dept: ONCOLOGY | Facility: HOSPITAL | Age: 60
End: 2025-08-27
Payer: COMMERCIAL

## 2025-08-27 ENCOUNTER — TELEPHONE (OUTPATIENT)
Dept: PRIMARY CARE CLINIC | Age: 60
End: 2025-08-27

## 2025-08-27 VITALS
BODY MASS INDEX: 31.58 KG/M2 | OXYGEN SATURATION: 96 % | TEMPERATURE: 96.8 F | RESPIRATION RATE: 18 BRPM | HEIGHT: 64 IN | WEIGHT: 185 LBS | HEART RATE: 108 BPM | DIASTOLIC BLOOD PRESSURE: 81 MMHG | SYSTOLIC BLOOD PRESSURE: 124 MMHG

## 2025-08-27 DIAGNOSIS — C78.7 CHOLANGIOCARCINOMA METASTATIC TO LIVER: Primary | ICD-10-CM

## 2025-08-27 DIAGNOSIS — C22.1 CHOLANGIOCARCINOMA METASTATIC TO LIVER: Primary | ICD-10-CM

## 2025-08-27 DIAGNOSIS — Z45.2 ENCOUNTER FOR CARE RELATED TO PORT-A-CATH: ICD-10-CM

## 2025-08-27 LAB
ALBUMIN SERPL-MCNC: 3.3 G/DL (ref 3.5–5.2)
ALBUMIN/GLOB SERPL: 1 G/DL
ALP SERPL-CCNC: 358 U/L (ref 39–117)
ALT SERPL W P-5'-P-CCNC: 10 U/L (ref 1–33)
ANION GAP SERPL CALCULATED.3IONS-SCNC: 13 MMOL/L (ref 5–15)
AST SERPL-CCNC: 18 U/L (ref 1–32)
BASOPHILS # BLD AUTO: 0.06 10*3/MM3 (ref 0–0.2)
BASOPHILS NFR BLD AUTO: 0.6 % (ref 0–1.5)
BILIRUB SERPL-MCNC: 0.5 MG/DL (ref 0–1.2)
BUN SERPL-MCNC: 10.4 MG/DL (ref 6–20)
BUN/CREAT SERPL: 21.2 (ref 7–25)
CALCIUM SPEC-SCNC: 9.2 MG/DL (ref 8.6–10.5)
CHLORIDE SERPL-SCNC: 96 MMOL/L (ref 98–107)
CO2 SERPL-SCNC: 29 MMOL/L (ref 22–29)
CREAT SERPL-MCNC: 0.49 MG/DL (ref 0.57–1)
DEPRECATED RDW RBC AUTO: 39.9 FL (ref 37–54)
EGFRCR SERPLBLD CKD-EPI 2021: 108.7 ML/MIN/1.73
EOSINOPHIL # BLD AUTO: 0.41 10*3/MM3 (ref 0–0.4)
EOSINOPHIL NFR BLD AUTO: 4 % (ref 0.3–6.2)
ERYTHROCYTE [DISTWIDTH] IN BLOOD BY AUTOMATED COUNT: 12.9 % (ref 12.3–15.4)
GLOBULIN UR ELPH-MCNC: 3.3 GM/DL
GLUCOSE SERPL-MCNC: 120 MG/DL (ref 65–99)
HCT VFR BLD AUTO: 33.6 % (ref 34–46.6)
HGB BLD-MCNC: 10.8 G/DL (ref 12–15.9)
HOLD SPECIMEN: NORMAL
IMM GRANULOCYTES # BLD AUTO: 0.08 10*3/MM3 (ref 0–0.05)
IMM GRANULOCYTES NFR BLD AUTO: 0.8 % (ref 0–0.5)
LYMPHOCYTES # BLD AUTO: 0.74 10*3/MM3 (ref 0.7–3.1)
LYMPHOCYTES NFR BLD AUTO: 7.3 % (ref 19.6–45.3)
MAGNESIUM SERPL-MCNC: 1.8 MG/DL (ref 1.6–2.6)
MCH RBC QN AUTO: 27.3 PG (ref 26.6–33)
MCHC RBC AUTO-ENTMCNC: 32.1 G/DL (ref 31.5–35.7)
MCV RBC AUTO: 84.8 FL (ref 79–97)
MONOCYTES # BLD AUTO: 0.96 10*3/MM3 (ref 0.1–0.9)
MONOCYTES NFR BLD AUTO: 9.4 % (ref 5–12)
NEUTROPHILS NFR BLD AUTO: 7.93 10*3/MM3 (ref 1.7–7)
NEUTROPHILS NFR BLD AUTO: 77.9 % (ref 42.7–76)
NRBC BLD AUTO-RTO: 0 /100 WBC (ref 0–0.2)
PLATELET # BLD AUTO: 314 10*3/MM3 (ref 140–450)
PMV BLD AUTO: 9.2 FL (ref 6–12)
POTASSIUM SERPL-SCNC: 3.7 MMOL/L (ref 3.5–5.2)
PROT SERPL-MCNC: 6.6 G/DL (ref 6–8.5)
RBC # BLD AUTO: 3.96 10*6/MM3 (ref 3.77–5.28)
SODIUM SERPL-SCNC: 138 MMOL/L (ref 136–145)
T4 FREE SERPL-MCNC: 1.29 NG/DL (ref 0.92–1.68)
TSH SERPL DL<=0.05 MIU/L-ACNC: 1.86 UIU/ML (ref 0.27–4.2)
WBC NRBC COR # BLD AUTO: 10.18 10*3/MM3 (ref 3.4–10.8)

## 2025-08-27 PROCEDURE — 83735 ASSAY OF MAGNESIUM: CPT

## 2025-08-27 PROCEDURE — 96368 THER/DIAG CONCURRENT INF: CPT

## 2025-08-27 PROCEDURE — 25010000002 DURVALUMAB 50 MG/ML SOLUTION 10 ML VIAL: Performed by: INTERNAL MEDICINE

## 2025-08-27 PROCEDURE — 25010000002 CISPLATIN PER 50 MG: Performed by: INTERNAL MEDICINE

## 2025-08-27 PROCEDURE — 25810000003 SODIUM CHLORIDE 0.9 % SOLUTION: Performed by: INTERNAL MEDICINE

## 2025-08-27 PROCEDURE — 84439 ASSAY OF FREE THYROXINE: CPT

## 2025-08-27 PROCEDURE — 96376 TX/PRO/DX INJ SAME DRUG ADON: CPT

## 2025-08-27 PROCEDURE — 96361 HYDRATE IV INFUSION ADD-ON: CPT

## 2025-08-27 PROCEDURE — 25810000003 SODIUM CHLORIDE 0.9 % SOLUTION 250 ML FLEX CONT: Performed by: INTERNAL MEDICINE

## 2025-08-27 PROCEDURE — 25010000002 FOSAPREPITANT PER 1 MG: Performed by: INTERNAL MEDICINE

## 2025-08-27 PROCEDURE — 25010000002 PALONOSETRON PER 25 MCG: Performed by: INTERNAL MEDICINE

## 2025-08-27 PROCEDURE — 25010000002 SODIUM CHLORIDE 0.9 % WITH KCL 20 MEQ 20-0.9 MEQ/L-% SOLUTION: Performed by: INTERNAL MEDICINE

## 2025-08-27 PROCEDURE — 25010000002 GEMCITABINE 1 GM/26.3ML SOLUTION 26.3 ML VIAL: Performed by: INTERNAL MEDICINE

## 2025-08-27 PROCEDURE — 25010000002 DEXAMETHASONE PER 1 MG: Performed by: INTERNAL MEDICINE

## 2025-08-27 PROCEDURE — 25010000002 FUROSEMIDE PER 20 MG: Performed by: INTERNAL MEDICINE

## 2025-08-27 PROCEDURE — 25010000002 FAMOTIDINE 10 MG/ML SOLUTION: Performed by: INTERNAL MEDICINE

## 2025-08-27 PROCEDURE — 25010000002 SODIUM CHLORIDE 0.9 % WITH KCL 20 MEQ 20-0.9 MEQ/L-% SOLUTION 1,000 ML FLEX CONT: Performed by: INTERNAL MEDICINE

## 2025-08-27 PROCEDURE — 80050 GENERAL HEALTH PANEL: CPT

## 2025-08-27 PROCEDURE — 25010000002 MAGNESIUM SULFATE PER 500 MG OF MAGNESIUM: Performed by: INTERNAL MEDICINE

## 2025-08-27 PROCEDURE — 25010000002 DIPHENHYDRAMINE PER 50 MG: Performed by: INTERNAL MEDICINE

## 2025-08-27 PROCEDURE — 25010000002 HEPARIN LOCK FLUSH PER 10 UNITS: Performed by: INTERNAL MEDICINE

## 2025-08-27 PROCEDURE — 96375 TX/PRO/DX INJ NEW DRUG ADDON: CPT

## 2025-08-27 PROCEDURE — 36415 COLL VENOUS BLD VENIPUNCTURE: CPT

## 2025-08-27 PROCEDURE — 96366 THER/PROPH/DIAG IV INF ADDON: CPT

## 2025-08-27 PROCEDURE — 96417 CHEMO IV INFUS EACH ADDL SEQ: CPT

## 2025-08-27 PROCEDURE — 96367 TX/PROPH/DG ADDL SEQ IV INF: CPT

## 2025-08-27 PROCEDURE — 96413 CHEMO IV INFUSION 1 HR: CPT

## 2025-08-27 RX ORDER — HYDROCORTISONE SODIUM SUCCINATE 100 MG/2ML
100 INJECTION INTRAMUSCULAR; INTRAVENOUS AS NEEDED
OUTPATIENT
Start: 2025-09-03

## 2025-08-27 RX ORDER — HEPARIN SODIUM (PORCINE) LOCK FLUSH IV SOLN 100 UNIT/ML 100 UNIT/ML
500 SOLUTION INTRAVENOUS AS NEEDED
OUTPATIENT
Start: 2025-08-27

## 2025-08-27 RX ORDER — SODIUM CHLORIDE 0.9 % (FLUSH) 0.9 %
10 SYRINGE (ML) INJECTION AS NEEDED
OUTPATIENT
Start: 2025-08-27

## 2025-08-27 RX ORDER — SODIUM CHLORIDE 0.9 % (FLUSH) 0.9 %
10 SYRINGE (ML) INJECTION AS NEEDED
Status: CANCELLED | OUTPATIENT
Start: 2025-08-27

## 2025-08-27 RX ORDER — FAMOTIDINE 10 MG/ML
20 INJECTION, SOLUTION INTRAVENOUS AS NEEDED
Status: CANCELLED | OUTPATIENT
Start: 2025-08-27

## 2025-08-27 RX ORDER — FAMOTIDINE 10 MG/ML
20 INJECTION, SOLUTION INTRAVENOUS ONCE
Status: CANCELLED
Start: 2025-08-27

## 2025-08-27 RX ORDER — PALONOSETRON 0.05 MG/ML
0.25 INJECTION, SOLUTION INTRAVENOUS ONCE
Status: CANCELLED | OUTPATIENT
Start: 2025-08-27

## 2025-08-27 RX ORDER — FUROSEMIDE 10 MG/ML
20 INJECTION INTRAMUSCULAR; INTRAVENOUS ONCE
Start: 2025-09-03

## 2025-08-27 RX ORDER — PALONOSETRON 0.05 MG/ML
0.25 INJECTION, SOLUTION INTRAVENOUS ONCE
OUTPATIENT
Start: 2025-09-03

## 2025-08-27 RX ORDER — PALONOSETRON 0.05 MG/ML
0.25 INJECTION, SOLUTION INTRAVENOUS ONCE
Status: COMPLETED | OUTPATIENT
Start: 2025-08-27 | End: 2025-08-27

## 2025-08-27 RX ORDER — SODIUM CHLORIDE 0.9 % (FLUSH) 0.9 %
10 SYRINGE (ML) INJECTION AS NEEDED
Status: DISCONTINUED | OUTPATIENT
Start: 2025-08-27 | End: 2025-08-27 | Stop reason: HOSPADM

## 2025-08-27 RX ORDER — FAMOTIDINE 10 MG/ML
20 INJECTION, SOLUTION INTRAVENOUS ONCE
Start: 2025-09-03

## 2025-08-27 RX ORDER — DIPHENHYDRAMINE HYDROCHLORIDE 50 MG/ML
50 INJECTION, SOLUTION INTRAMUSCULAR; INTRAVENOUS AS NEEDED
Status: CANCELLED | OUTPATIENT
Start: 2025-08-27

## 2025-08-27 RX ORDER — DIPHENHYDRAMINE HYDROCHLORIDE 50 MG/ML
50 INJECTION, SOLUTION INTRAMUSCULAR; INTRAVENOUS AS NEEDED
OUTPATIENT
Start: 2025-09-03

## 2025-08-27 RX ORDER — FAMOTIDINE 10 MG/ML
20 INJECTION, SOLUTION INTRAVENOUS AS NEEDED
Status: DISCONTINUED | OUTPATIENT
Start: 2025-08-27 | End: 2025-08-27 | Stop reason: HOSPADM

## 2025-08-27 RX ORDER — FUROSEMIDE 10 MG/ML
20 INJECTION INTRAMUSCULAR; INTRAVENOUS ONCE
Status: COMPLETED | OUTPATIENT
Start: 2025-08-27 | End: 2025-08-27

## 2025-08-27 RX ORDER — DIPHENHYDRAMINE HYDROCHLORIDE 50 MG/ML
50 INJECTION, SOLUTION INTRAMUSCULAR; INTRAVENOUS ONCE
Status: CANCELLED
Start: 2025-08-27

## 2025-08-27 RX ORDER — DIPHENHYDRAMINE HYDROCHLORIDE 50 MG/ML
50 INJECTION, SOLUTION INTRAMUSCULAR; INTRAVENOUS AS NEEDED
Status: DISCONTINUED | OUTPATIENT
Start: 2025-08-27 | End: 2025-08-27 | Stop reason: HOSPADM

## 2025-08-27 RX ORDER — HEPARIN SODIUM (PORCINE) LOCK FLUSH IV SOLN 100 UNIT/ML 100 UNIT/ML
500 SOLUTION INTRAVENOUS AS NEEDED
Status: CANCELLED | OUTPATIENT
Start: 2025-08-27

## 2025-08-27 RX ORDER — HYDROCORTISONE SODIUM SUCCINATE 100 MG/2ML
100 INJECTION INTRAMUSCULAR; INTRAVENOUS AS NEEDED
Status: CANCELLED | OUTPATIENT
Start: 2025-08-27

## 2025-08-27 RX ORDER — SODIUM CHLORIDE AND POTASSIUM CHLORIDE 150; 900 MG/100ML; MG/100ML
500 INJECTION, SOLUTION INTRAVENOUS CONTINUOUS
Status: DISPENSED | OUTPATIENT
Start: 2025-08-27 | End: 2025-08-27

## 2025-08-27 RX ORDER — SODIUM CHLORIDE AND POTASSIUM CHLORIDE 150; 900 MG/100ML; MG/100ML
500 INJECTION, SOLUTION INTRAVENOUS CONTINUOUS
Start: 2025-09-03

## 2025-08-27 RX ORDER — SODIUM CHLORIDE 9 MG/ML
20 INJECTION, SOLUTION INTRAVENOUS ONCE
OUTPATIENT
Start: 2025-09-03

## 2025-08-27 RX ORDER — FUROSEMIDE 10 MG/ML
20 INJECTION INTRAMUSCULAR; INTRAVENOUS ONCE
Status: CANCELLED
Start: 2025-08-27

## 2025-08-27 RX ORDER — HEPARIN SODIUM (PORCINE) LOCK FLUSH IV SOLN 100 UNIT/ML 100 UNIT/ML
500 SOLUTION INTRAVENOUS AS NEEDED
Status: DISCONTINUED | OUTPATIENT
Start: 2025-08-27 | End: 2025-08-27 | Stop reason: HOSPADM

## 2025-08-27 RX ORDER — SODIUM CHLORIDE AND POTASSIUM CHLORIDE 150; 900 MG/100ML; MG/100ML
500 INJECTION, SOLUTION INTRAVENOUS CONTINUOUS
Status: CANCELLED
Start: 2025-08-27

## 2025-08-27 RX ORDER — PROCHLORPERAZINE MALEATE 10 MG
10 TABLET ORAL EVERY 6 HOURS PRN
Qty: 30 TABLET | Refills: 5 | Status: SHIPPED | OUTPATIENT
Start: 2025-08-27

## 2025-08-27 RX ORDER — FAMOTIDINE 10 MG/ML
20 INJECTION, SOLUTION INTRAVENOUS ONCE
Status: COMPLETED | OUTPATIENT
Start: 2025-08-27 | End: 2025-08-27

## 2025-08-27 RX ORDER — HYDROCORTISONE SODIUM SUCCINATE 100 MG/2ML
100 INJECTION INTRAMUSCULAR; INTRAVENOUS AS NEEDED
Status: DISCONTINUED | OUTPATIENT
Start: 2025-08-27 | End: 2025-08-27 | Stop reason: HOSPADM

## 2025-08-27 RX ORDER — DEXAMETHASONE SODIUM PHOSPHATE 10 MG/ML
10 INJECTION, SOLUTION INTRA-ARTICULAR; INTRALESIONAL; INTRAMUSCULAR; INTRAVENOUS; SOFT TISSUE ONCE
Status: COMPLETED | OUTPATIENT
Start: 2025-08-27 | End: 2025-08-27

## 2025-08-27 RX ORDER — FAMOTIDINE 10 MG/ML
20 INJECTION, SOLUTION INTRAVENOUS AS NEEDED
OUTPATIENT
Start: 2025-09-03

## 2025-08-27 RX ORDER — DIPHENHYDRAMINE HYDROCHLORIDE 50 MG/ML
50 INJECTION, SOLUTION INTRAMUSCULAR; INTRAVENOUS ONCE
Status: DISCONTINUED | OUTPATIENT
Start: 2025-08-27 | End: 2025-08-27

## 2025-08-27 RX ORDER — SODIUM CHLORIDE 9 MG/ML
20 INJECTION, SOLUTION INTRAVENOUS ONCE
Status: CANCELLED | OUTPATIENT
Start: 2025-08-27

## 2025-08-27 RX ORDER — SODIUM CHLORIDE 9 MG/ML
20 INJECTION, SOLUTION INTRAVENOUS ONCE
Status: COMPLETED | OUTPATIENT
Start: 2025-08-27 | End: 2025-08-27

## 2025-08-27 RX ORDER — DIPHENHYDRAMINE HYDROCHLORIDE 50 MG/ML
50 INJECTION, SOLUTION INTRAMUSCULAR; INTRAVENOUS ONCE
Status: CANCELLED
Start: 2025-09-03

## 2025-08-27 RX ADMIN — MAGNESIUM SULFATE 500 ML/HR: 500 INJECTION, SOLUTION INTRAMUSCULAR; INTRAVENOUS at 12:12

## 2025-08-27 RX ADMIN — DEXAMETHASONE SODIUM PHOSPHATE 10 MG: 10 INJECTION INTRAMUSCULAR; INTRAVENOUS at 09:59

## 2025-08-27 RX ADMIN — SODIUM CHLORIDE 1500 MG: 9 INJECTION, SOLUTION INTRAVENOUS at 11:00

## 2025-08-27 RX ADMIN — SODIUM CHLORIDE 1500 MG: 9 INJECTION, SOLUTION INTRAVENOUS at 12:11

## 2025-08-27 RX ADMIN — FAMOTIDINE 20 MG: 10 INJECTION INTRAVENOUS at 09:55

## 2025-08-27 RX ADMIN — FOSAPREPITANT 150 MG: 150 INJECTION, POWDER, LYOPHILIZED, FOR SOLUTION INTRAVENOUS at 10:25

## 2025-08-27 RX ADMIN — Medication 10 ML: at 16:49

## 2025-08-27 RX ADMIN — SODIUM CHLORIDE 48 MG: 9 INJECTION, SOLUTION INTRAVENOUS at 14:47

## 2025-08-27 RX ADMIN — SODIUM CHLORIDE 20 ML/HR: 9 INJECTION, SOLUTION INTRAVENOUS at 09:30

## 2025-08-27 RX ADMIN — SODIUM CHLORIDE 50 MG: 9 INJECTION, SOLUTION INTRAVENOUS at 10:07

## 2025-08-27 RX ADMIN — Medication 500 UNITS: at 16:50

## 2025-08-27 RX ADMIN — PALONOSETRON HYDROCHLORIDE 0.25 MG: 0.25 INJECTION, SOLUTION INTRAVENOUS at 09:53

## 2025-08-27 RX ADMIN — FUROSEMIDE 20 MG: 10 INJECTION, SOLUTION INTRAMUSCULAR; INTRAVENOUS at 16:00

## 2025-08-27 RX ADMIN — FUROSEMIDE 20 MG: 10 INJECTION, SOLUTION INTRAMUSCULAR; INTRAVENOUS at 14:42

## 2025-08-27 RX ADMIN — POTASSIUM CHLORIDE AND SODIUM CHLORIDE 500 ML/HR: 900; 150 INJECTION, SOLUTION INTRAVENOUS at 14:46

## 2025-09-02 ENCOUNTER — OFFICE VISIT (OUTPATIENT)
Dept: UROLOGY | Age: 60
End: 2025-09-02
Payer: MEDICAID

## 2025-09-02 ENCOUNTER — HOSPITAL ENCOUNTER (OUTPATIENT)
Dept: GENERAL RADIOLOGY | Age: 60
Discharge: HOME | End: 2025-09-02
Payer: MEDICAID

## 2025-09-02 VITALS — HEIGHT: 67 IN | WEIGHT: 180 LBS | TEMPERATURE: 97.1 F | BODY MASS INDEX: 28.25 KG/M2

## 2025-09-02 DIAGNOSIS — N20.0 KIDNEY STONE: ICD-10-CM

## 2025-09-02 DIAGNOSIS — N20.0 RENAL CALCULUS, RIGHT: Primary | ICD-10-CM

## 2025-09-02 LAB
APPEARANCE FLUID: CLEAR
BILIRUBIN, POC: NORMAL
BLOOD URINE, POC: NORMAL
CLARITY, POC: CLEAR
COLOR, POC: YELLOW
GLUCOSE URINE, POC: NORMAL MG/DL
KETONES, POC: NORMAL MG/DL
LEUKOCYTE EST, POC: NORMAL
NITRITE, POC: NORMAL
PH, POC: 7
PROTEIN, POC: 100 MG/DL
SPECIFIC GRAVITY, POC: 1.01
UROBILINOGEN, POC: 1 MG/DL

## 2025-09-02 PROCEDURE — 1036F TOBACCO NON-USER: CPT | Performed by: NURSE PRACTITIONER

## 2025-09-02 PROCEDURE — G8417 CALC BMI ABV UP PARAM F/U: HCPCS | Performed by: NURSE PRACTITIONER

## 2025-09-02 PROCEDURE — 74018 RADEX ABDOMEN 1 VIEW: CPT

## 2025-09-02 PROCEDURE — 99204 OFFICE O/P NEW MOD 45 MIN: CPT | Performed by: NURSE PRACTITIONER

## 2025-09-02 PROCEDURE — 3017F COLORECTAL CA SCREEN DOC REV: CPT | Performed by: NURSE PRACTITIONER

## 2025-09-02 PROCEDURE — G8427 DOCREV CUR MEDS BY ELIG CLIN: HCPCS | Performed by: NURSE PRACTITIONER

## 2025-09-02 PROCEDURE — 81002 URINALYSIS NONAUTO W/O SCOPE: CPT | Performed by: NURSE PRACTITIONER

## 2025-09-03 ASSESSMENT — ENCOUNTER SYMPTOMS
BACK PAIN: 0
VOMITING: 0
ABDOMINAL DISTENTION: 0
NAUSEA: 0
ABDOMINAL PAIN: 0

## (undated) DEVICE — THE SINGLE USE ETRAP – POLYP TRAP IS USED FOR SUCTION RETRIEVAL OF ENDOSCOPICALLY REMOVED POLYPS.: Brand: ETRAP

## (undated) DEVICE — CHLORAPREP 26ML ORANGE

## (undated) DEVICE — DRAPE,LAP,CHOLE,W/TROUGHS,STERILE: Brand: MEDLINE

## (undated) DEVICE — 9165 UNIVERSAL PATIENT PLATE: Brand: 3M™

## (undated) DEVICE — SENSR O2 OXIMAX FNGR A/ 18IN NONSTR

## (undated) DEVICE — MAJOR BSIN SETUP PK

## (undated) DEVICE — SNAR POLYP SENSATION MICRO OVL 13 240X40

## (undated) DEVICE — CONMED GOLDLINE ELECTROSURGICAL HANDPIECE, HAND CONTROLLED WITH BLADE ELECTRODE, BUTTON SWITCH, SAFETY HOLSTER AND 10 FT (3 M) CABLE: Brand: CONMED GOLDLINE

## (undated) DEVICE — THE CHANNEL CLEANING BRUSH IS A NYLON FLEXI BRUSH ATTACHED TO A FLEXIBLE PLASTIC SHEATH DESIGNED TO SAFELY REMOVE DEBRIS FROM FLEXIBLE ENDOSCOPES.

## (undated) DEVICE — YANKAUER,BULB TIP WITH VENT: Brand: ARGYLE

## (undated) DEVICE — SUTURE VCRL SZ 3-0 L27IN ABSRB UD L26MM SH 1/2 CIR J416H

## (undated) DEVICE — ASTOUND STANDARD SURGICAL GOWN, XL: Brand: CONVERTORS

## (undated) DEVICE — Device: Brand: DEFENDO AIR/WATER/SUCTION AND BIOPSY VALVE

## (undated) DEVICE — SUTURE SZ 0 27IN 5/8 CIR UR-6  TAPER PT VIOLET ABSRB VICRYL J603H

## (undated) DEVICE — KIT,ANTI FOG,W/SPONGE & FLUID,SOFT PACK: Brand: MEDLINE

## (undated) DEVICE — MASK,OXYGEN,MED CONC,ADLT,7' TUB, UC: Brand: PENDING

## (undated) DEVICE — INSUFFLATION TUBING,LAPAROSCOPIC: Brand: DEROYAL

## (undated) DEVICE — PRESSURE SYSTEM DISPOSABLE SUCTION/IRRIGATOR, DUAL SPIKE TUBING: Brand: STRYKEFLOW

## (undated) DEVICE — ENDOGATOR AUXILIARY WATER JET CONNECTOR: Brand: ENDOGATOR

## (undated) DEVICE — CUFF,BP,DISP,1 TUBE,ADULT,HP: Brand: MEDLINE

## (undated) DEVICE — SUTURE MCRYL SZ 4-0 L18IN ABSRB UD L19MM PS-2 3/8 CIR PRIM Y496G

## (undated) DEVICE — MASK ANES AD M SZ 5 PREM TAIL VLV INFL PRT UNSCENTED HK RNG

## (undated) DEVICE — APPLIER CLP M/L SHFT DIA5MM 15 LIG LIGAMAX 5

## (undated) DEVICE — TROCAR ENDOSCP BLADED 5X100 MM

## (undated) DEVICE — GLOVE SURG SZ 7 CRM LTX FREE POLYISOPRENE POLYMER BEAD ANTI

## (undated) DEVICE — POUCH SPEC RETRV ENDO

## (undated) DEVICE — TBG SMPL FLTR LINE NASL 02/C02 A/ BX/100

## (undated) DEVICE — ADHESIVE SKIN CLSR 0.7ML TOP DERMBND ADV

## (undated) DEVICE — INTENDED FOR TISSUE SEPARATION, AND OTHER PROCEDURES THAT REQUIRE A SHARP SURGICAL BLADE TO PUNCTURE OR CUT.: Brand: BARD-PARKER ® CARBON RIB-BACK BLADES

## (undated) DEVICE — AIRWAY CIRCUIT: Brand: DEROYAL

## (undated) DEVICE — TROCAR: Brand: KII SHIELDED BLADED ACCESS SYSTEM